# Patient Record
Sex: FEMALE | Race: WHITE | NOT HISPANIC OR LATINO | Employment: OTHER | URBAN - METROPOLITAN AREA
[De-identification: names, ages, dates, MRNs, and addresses within clinical notes are randomized per-mention and may not be internally consistent; named-entity substitution may affect disease eponyms.]

---

## 2017-04-11 ENCOUNTER — GENERIC CONVERSION - ENCOUNTER (OUTPATIENT)
Dept: OTHER | Facility: OTHER | Age: 82
End: 2017-04-11

## 2017-04-11 ENCOUNTER — ALLSCRIPTS OFFICE VISIT (OUTPATIENT)
Dept: OTHER | Facility: OTHER | Age: 82
End: 2017-04-11

## 2017-04-11 ENCOUNTER — TRANSCRIBE ORDERS (OUTPATIENT)
Dept: LAB | Facility: CLINIC | Age: 82
End: 2017-04-11

## 2017-04-11 ENCOUNTER — APPOINTMENT (OUTPATIENT)
Dept: LAB | Facility: CLINIC | Age: 82
End: 2017-04-11
Payer: MEDICARE

## 2017-04-11 DIAGNOSIS — E78.2 MIXED HYPERLIPIDEMIA: ICD-10-CM

## 2017-04-11 DIAGNOSIS — M81.0 AGE-RELATED OSTEOPOROSIS WITHOUT CURRENT PATHOLOGICAL FRACTURE: ICD-10-CM

## 2017-04-11 LAB
ALBUMIN SERPL BCP-MCNC: 3.4 G/DL (ref 3.5–5)
ALP SERPL-CCNC: 63 U/L (ref 46–116)
ALT SERPL W P-5'-P-CCNC: 19 U/L (ref 12–78)
ANION GAP SERPL CALCULATED.3IONS-SCNC: 6 MMOL/L (ref 4–13)
AST SERPL W P-5'-P-CCNC: 26 U/L (ref 5–45)
BILIRUB SERPL-MCNC: 1.24 MG/DL (ref 0.2–1)
BUN SERPL-MCNC: 17 MG/DL (ref 5–25)
CALCIUM SERPL-MCNC: 8.9 MG/DL (ref 8.3–10.1)
CHLORIDE SERPL-SCNC: 102 MMOL/L (ref 100–108)
CHOLEST SERPL-MCNC: 170 MG/DL (ref 50–200)
CO2 SERPL-SCNC: 31 MMOL/L (ref 21–32)
CREAT SERPL-MCNC: 0.6 MG/DL (ref 0.6–1.3)
GFR SERPL CREATININE-BSD FRML MDRD: >60 ML/MIN/1.73SQ M
GLUCOSE P FAST SERPL-MCNC: 83 MG/DL (ref 65–99)
HDLC SERPL-MCNC: 43 MG/DL (ref 40–60)
LDLC SERPL CALC-MCNC: 93 MG/DL (ref 0–100)
POTASSIUM SERPL-SCNC: 4 MMOL/L (ref 3.5–5.3)
PROT SERPL-MCNC: 7.9 G/DL (ref 6.4–8.2)
SODIUM SERPL-SCNC: 139 MMOL/L (ref 136–145)
TRIGL SERPL-MCNC: 171 MG/DL

## 2017-04-11 PROCEDURE — 80053 COMPREHEN METABOLIC PANEL: CPT

## 2017-04-11 PROCEDURE — 80061 LIPID PANEL: CPT

## 2017-04-11 PROCEDURE — 36415 COLL VENOUS BLD VENIPUNCTURE: CPT

## 2017-08-22 ENCOUNTER — GENERIC CONVERSION - ENCOUNTER (OUTPATIENT)
Dept: OTHER | Facility: OTHER | Age: 82
End: 2017-08-22

## 2017-08-23 ENCOUNTER — APPOINTMENT (OUTPATIENT)
Dept: LAB | Facility: CLINIC | Age: 82
End: 2017-08-23
Payer: MEDICARE

## 2017-08-23 ENCOUNTER — TRANSCRIBE ORDERS (OUTPATIENT)
Dept: LAB | Facility: CLINIC | Age: 82
End: 2017-08-23

## 2017-08-23 DIAGNOSIS — R94.5 NONSPECIFIC ABNORMAL RESULTS OF LIVER FUNCTION STUDY: Primary | ICD-10-CM

## 2017-08-23 DIAGNOSIS — R94.5 NONSPECIFIC ABNORMAL RESULTS OF LIVER FUNCTION STUDY: ICD-10-CM

## 2017-08-23 LAB
BASOPHILS # BLD AUTO: 0.03 THOUSANDS/ΜL (ref 0–0.1)
BASOPHILS NFR BLD AUTO: 1 % (ref 0–1)
BILIRUB DIRECT SERPL-MCNC: 0.25 MG/DL (ref 0–0.2)
BILIRUB SERPL-MCNC: 1.39 MG/DL (ref 0.2–1)
EOSINOPHIL # BLD AUTO: 0.21 THOUSAND/ΜL (ref 0–0.61)
EOSINOPHIL NFR BLD AUTO: 3 % (ref 0–6)
ERYTHROCYTE [DISTWIDTH] IN BLOOD BY AUTOMATED COUNT: 12.9 % (ref 11.6–15.1)
HCT VFR BLD AUTO: 45.6 % (ref 34.8–46.1)
HGB BLD-MCNC: 15.5 G/DL (ref 11.5–15.4)
LYMPHOCYTES # BLD AUTO: 1.27 THOUSANDS/ΜL (ref 0.6–4.47)
LYMPHOCYTES NFR BLD AUTO: 20 % (ref 14–44)
MCH RBC QN AUTO: 32 PG (ref 26.8–34.3)
MCHC RBC AUTO-ENTMCNC: 34 G/DL (ref 31.4–37.4)
MCV RBC AUTO: 94 FL (ref 82–98)
MONOCYTES # BLD AUTO: 0.76 THOUSAND/ΜL (ref 0.17–1.22)
MONOCYTES NFR BLD AUTO: 12 % (ref 4–12)
NEUTROPHILS # BLD AUTO: 4.2 THOUSANDS/ΜL (ref 1.85–7.62)
NEUTS SEG NFR BLD AUTO: 64 % (ref 43–75)
NRBC BLD AUTO-RTO: 0 /100 WBCS
PLATELET # BLD AUTO: 310 THOUSANDS/UL (ref 149–390)
PMV BLD AUTO: 9.9 FL (ref 8.9–12.7)
RBC # BLD AUTO: 4.85 MILLION/UL (ref 3.81–5.12)
WBC # BLD AUTO: 6.49 THOUSAND/UL (ref 4.31–10.16)

## 2017-08-23 PROCEDURE — 82247 BILIRUBIN TOTAL: CPT

## 2017-08-23 PROCEDURE — 85025 COMPLETE CBC W/AUTO DIFF WBC: CPT | Performed by: INTERNAL MEDICINE

## 2017-08-23 PROCEDURE — 36415 COLL VENOUS BLD VENIPUNCTURE: CPT | Performed by: INTERNAL MEDICINE

## 2017-08-23 PROCEDURE — 82248 BILIRUBIN DIRECT: CPT

## 2017-10-13 ENCOUNTER — ALLSCRIPTS OFFICE VISIT (OUTPATIENT)
Dept: OTHER | Facility: OTHER | Age: 82
End: 2017-10-13

## 2017-10-16 NOTE — PROGRESS NOTES
Assessment  1  Benign essential hypertension (401 1) (I10)   2  Mixed hyperlipidemia (272 2) (E78 2)   3  Post-menopausal osteoporosis (733 01) (M81 0)    Plan  Benign essential hypertension    · HydroCHLOROthiazide 25 MG Oral Tablet; 1 every morning   · Lisinopril 2 5 MG Oral Tablet; TAKE ONE TABLET BY MOUTH EVERY DAY IN THE  MORNING  Need for influenza vaccination    · Fluzone High-Dose 0 5 ML Intramuscular Suspension Prefilled Syringe    Discussion/Summary    All stable, labs reviewed over last 12 m in 6 m  The treatment plan was reviewed with the patient/guardian  The patient/guardian understands and agrees with the treatment plan      Chief Complaint  Patient is here today for follow up of chronic conditions described in HPI  History of Present Illness  80 y o f seen for f/u HTN, Osteoporosis, HLD - all stable - asymptomatic      Review of Systems    Constitutional: No fever, no chills, feels well, no tiredness, no recent weight gain or weight loss  Eyes: No complaints of eye pain, no red eyes, no eyesight problems, no discharge, no dry eyes, no itching of eyes  ENT: no complaints of earache, no loss of hearing, no nose bleeds, no nasal discharge, no sore throat, no hoarseness  Cardiovascular: No complaints of slow heart rate, no fast heart rate, no chest pain, no palpitations, no leg claudication, no lower extremity edema  Respiratory: No complaints of shortness of breath, no wheezing, no cough, no SOB on exertion, no orthopnea, no PND  Gastrointestinal: No complaints of abdominal pain, no constipation, no nausea or vomiting, no diarrhea, no bloody stools  Genitourinary: No complaints of dysuria, no incontinence, no pelvic pain, no dysmenorrhea, no vaginal discharge or bleeding  Musculoskeletal: No complaints of arthralgias, no myalgias, no joint swelling or stiffness, no limb pain or swelling     Integumentary: No complaints of skin rash or lesions, no itching, no skin wounds, no breast pain or lump  Neurological: No complaints of headache, no confusion, no convulsions, no numbness, no dizziness or fainting, no tingling, no limb weakness, no difficulty walking  Psychiatric: Not suicidal, no sleep disturbance, no anxiety or depression, no change in personality, no emotional problems  Endocrine: No complaints of proptosis, no hot flashes, no muscle weakness, no deepening of the voice, no feelings of weakness  Hematologic/Lymphatic: No complaints of swollen glands, no swollen glands in the neck, does not bleed easily, does not bruise easily  Active Problems  1  Benign essential hypertension (401 1) (I10)   2  Encounter for screening mammogram for malignant neoplasm of breast (V76 12)   (Z12 31)   3  Flu vaccine need (V04 81) (Z23)   4  Mixed hyperlipidemia (272 2) (E78 2)   5  Post-menopausal osteoporosis (733 01) (M81 0)   6  Sarcoidosis (135) (D86 9)   7  Well adult on routine health check (V70 0) (Z00 00)    Past Medical History    The active problems and past medical history were reviewed and updated today  Surgical History  1  History of Appendectomy   2  History of Cataract Surgery   3  History of Hysterectomy   4  History of Partial Colectomy   5  History of Throat Surgery    The surgical history was reviewed and updated today  Family History  Sister    1  Family history of diabetes mellitus (V18 0) (Z83 3)   2  Family history of hypertension (V17 49) (Z82 49)    The family history was reviewed and updated today  Social History   · Denied: History of Alcohol Use (History)   · Daily Tea Consumption (1  Cups/Day)   · Non-smoker (V49 89) (Z78 9)  The social history was reviewed and updated today  Current Meds   1  Alendronate Sodium 70 MG Oral Tablet; once weekly; Therapy: 42Mpr3840 to (Last Rx:25Aot1832)  Requested for: 44XZX7421 Ordered   2  Calcium 600 + D 600-200 MG-UNIT Oral Tablet; 1 Every Day;    Therapy: 56KFT4821 to  Requested for: 47OPG8380 Recorded 3  HydroCHLOROthiazide 25 MG Oral Tablet; 1 EVERY MORNING; Therapy: 42Tlp0618 to (Last Rx:11Apr2017)  Requested for: 11Apr2017 Ordered   4  ICaps Oral Capsule; TAKE 1 CAPSULE DAILY; Therapy: 80JVP7932 to Recorded   5  Lisinopril 2 5 MG Oral Tablet; TAKE ONE TABLET BY MOUTH EVERY DAY IN THE   MORNING; Therapy: 11Apr2012 to (Evaluate:06Jan2018)  Requested for: 11Apr2017; Last   Rx:11Apr2017 Ordered   6  Vytorin 10-20 MG Oral Tablet; 1 qhs;   Therapy: 81Nkz1277 to (Last Rx:28Nov2016)  Requested for: 28Nov2016 Ordered    The medication list was reviewed and updated today  Allergies  1  Niaspan TBCR    Vitals  Vital Signs    Recorded: 57ARN2272 09:56AM   Temperature 97 3 F   Heart Rate 80   Respiration 16   Systolic 195   Diastolic 70   Height 5 ft 1 in   Weight 130 lb 3 2 oz   BMI Calculated 24 6   BSA Calculated 1 57     Physical Exam    Constitutional   General appearance: No acute distress, well appearing and well nourished  -- for pt's age  Pulmonary   Respiratory effort: No increased work of breathing or signs of respiratory distress  Auscultation of lungs: Clear to auscultation  Cardiovascular   Auscultation of heart: Normal rate and rhythm, normal S1 and S2, without murmurs  Examination of extremities for edema and/or varicosities: Normal     Musculoskeletal   Gait and station: Normal     Neurologic   Cranial nerves: Cranial nerves 2-12 intact  Psychiatric   Mood and affect: Normal          Health Management  Health Maintenance   COLONOSCOPY; every 5 years; Last 60YOW5345; Next Due: 92QSS1010; Active    Future Appointments    Date/Time Provider Specialty Site   04/13/2018 10:15 AM PB Cummings   Family Medicine [address]     Signatures   Electronically signed by : PB Rodriguez ; Oct 15 2017  7:03PM EST                       (Author)

## 2018-01-10 NOTE — MISCELLANEOUS
Assessment    1  Community acquired pneumonia (5) (J18 9)    Plan  Community acquired pneumonia    · * XR CHEST PA & LATERAL; Status:Active; Requested for:33Knc6989;    Perform: [facility]; LKW:04TOB8920;KSHSIGG; For:Community acquired pneumonia; Ordered By:Johnnie García; Discussion/Summary  Discussion Summary:   O2 sat w/o oxygen down from 95 to 92 % - was advised to keep O2 still but will cut down to 2 L/min   rto in 2 wks  Medication SE Review and Pt Understands Tx: The treatment plan was reviewed with the patient/guardian  The patient/guardian understands and agrees with the treatment plan      Chief Complaint  Chief Complaint Free Text Note Form: TCM  kss,cma      History of Present Illness  TCM Communication [facility]: The patient is being contacted for follow-up after hospitalization  She was hospitalized at and [address] [place] [address]  The date of admission: 8/1/16, date of discharge: 8/6/16  Diagnosis: Pneumonia - rectal bleed  She was discharged to home  Medications reviewed and updated today  She scheduled a follow up appointment  Symptoms: weakness and fatigue, but no fever, no dizziness, no headache, no cough, no shortness of breath, no chest pain, no back pain on left side, no back pain on right side, no arm pain left side, no arm pain on right side, no leg pain on left side, no leg pain on right side, no upper abdominal pain, no middle abdominal pain, no lower abdominal pain, no rash:, no anorexia, no nausea, no vomiting, no loose stools, no constipation, no pain with urinating, no incisional pain, no wound drainage and no swelling  Counseling was provided to the patient  Communication performed and completed by AARON Walden LPN 1/[birth year]/34   HPI: 80 y o f seen for f/u hospital stay 2 to pneumonia - finished ABs - still on O2 via NC at 3 L/min - no SOB, cough resolved, feeling better overall - eating better, still on PPI - was advised to use it for 1 m more      Review of Systems  Complete-Female:   Constitutional: No fever, no chills, feels well, no tiredness, no recent weight gain or weight loss  Cardiovascular: No complaints of slow heart rate, no fast heart rate, no chest pain, no palpitations, no leg claudication, no lower extremity edema  Respiratory: No complaints of shortness of breath, no wheezing, no cough, no SOB on exertion, no orthopnea, no PND  Gastrointestinal: No complaints of abdominal pain, no constipation, no nausea or vomiting, no diarrhea, no bloody stools  Genitourinary: No complaints of dysuria, no incontinence, no pelvic pain, no dysmenorrhea, no vaginal discharge or bleeding  Musculoskeletal: No complaints of arthralgias, no myalgias, no joint swelling or stiffness, no limb pain or swelling  Integumentary: No complaints of skin rash or lesions, no itching, no skin wounds, no breast pain or lump  Active Problems    1  Benign essential hypertension (401 1) (I10)   2  Encounter for screening mammogram for malignant neoplasm of breast (V76 12)   (Z12 31)   3  Mixed hyperlipidemia (272 2) (E78 2)   4  Post-menopausal osteoporosis (733 01) (M81 0)   5  Sarcoidosis (135) (D86 9)   6  Well adult on routine health check (V70 0) (Z00 00)    Family History  Sister    1  Family history of hypertension (V17 49) (Z82 49)  Family History Reviewed: The family history was reviewed and updated today  Social History    · Denied: History of Alcohol Use (History)   · Daily Tea Consumption (1  Cups/Day)   · Non-smoker (V49 89) (Z78 9)  Social History Reviewed: The social history was reviewed and updated today  Current Meds   1  Alendronate Sodium 70 MG Oral Tablet; once weekly; Therapy: 98Xha3127 to ([place])  Requested for: 80BJD1380 Ordered   2  Calcium 600 + D 600-200 MG-UNIT Oral Tablet; 1 Every Day; Therapy: 79VUQ3325 to  Requested for: 33CLP3279 Recorded   3  Fish Oil 1000 MG Oral Capsule; TAKE 1 CAPSULE DAILY;    Therapy: (Recorded:02Jan2013) to Recorded   4  Hydrochlorothiazide 25 MG Oral Tablet; 1 EVERY MORNING; Therapy: 83Pcj3227 to (Last Rx:29Jun2016)  Requested for: 65ZNQ9345 Ordered   5  ICaps Oral Capsule; TAKE 1 CAPSULE DAILY; Therapy: 15MNL5181 to Recorded   6  Lisinopril 2 5 MG Oral Tablet; TAKE ONE TABLET BY MOUTH EVERY DAY IN THE   MORNING; Therapy: 79Oni1388 to (Evaluate:26Mar2017)  Requested for: 65DKQ8356; Last   Rx:29Jun2016 Ordered   7  Vytorin 10-20 MG Oral Tablet; 1 qhs;   Therapy: 77Pry0684 to (Last Rx:29Jun2016)  Requested for: 57GVI1749 Ordered    Allergies    1  Niaspan TBCR    Vitals  Signs   Recorded: 26HJQ3457 20:90EN   Systolic: 295  Diastolic: 60  Heart Rate: 90  Respiration: 16  Temperature: 98 4 F  O2 Saturation: 94  Height: 5 ft 1 in  Weight: 129 lb   BMI Calculated: 24 37  BSA Calculated: 1 57    Physical Exam    Constitutional   General appearance: No acute distress, well appearing and well nourished  Pulmonary   Respiratory effort: No increased work of breathing or signs of respiratory distress  Auscultation of lungs: Clear to auscultation  Cardiovascular   Auscultation of heart: Normal rate and rhythm, normal S1 and S2, without murmurs  Musculoskeletal   Gait and station: Normal     Neurologic   Cranial nerves: Cranial nerves 2-12 intact  Health Management  Health Maintenance   COLONOSCOPY; every 5 years; Last 58WBG4174; Next Due: 57BAK5709; Active    Future Appointments    Date/Time Provider Specialty Site   08/22/2016 10:00 AM PB Arndt  [address] [place] [address]   12/30/2016 11:00 AM PB Arndt   Family Medicine [address] [facility]     Signatures   Electronically signed by : PB Mann ; Aug 15 2016  6:22PM EST                       (Author)

## 2018-01-12 NOTE — RESULT NOTES
Verified Results  (1) COMPREHENSIVE METABOLIC PANEL 27WPB4289 70:93HG Jaja Noguera Order Number: [id number]     Test Name Result Flag Reference   GLUCOSE,RANDM 95 mg/dL     If the patient is fasting, the ADA then defines impaired fasting glucose as > 100 mg/dL and diabetes as > or equal to 123 mg/dL  SODIUM 139 mmol/L  136-145   POTASSIUM 4 0 mmol/L  3 5-5 3   CHLORIDE 102 mmol/L  100-108   CARBON DIOXIDE 32 mmol/L  21-32   ANION GAP (CALC) 5 mmol/L  4-13   BLOOD UREA NITROGEN 13 mg/dL  5-25   CREATININE 0 64 mg/dL  0 60-1 30   Standardized to IDMS reference method   CALCIUM 9 0 mg/dL  8 3-10 1   BILI, TOTAL 1 05 mg/dL H 0 20-1 00   ALK PHOSPHATAS 65 U/L     ALT (SGPT) 19 U/L  12-78   AST(SGOT) 21 U/L  5-45   ALBUMIN 3 5 g/dL  3 5-5 0   TOTAL PROTEIN 7 8 g/dL  6 4-8 2   eGFR Non-African American      >60 0 ml/min/1 73sq m   - Patient Instructions: This is a fasting blood test  Water,black tea or black  coffee only after 9:00pm the night before test Drink 2 glasses of water the morning of test   National Kidney Disease Education Program recommendations are as follows:  GFR calculation is accurate only with a steady state creatinine  Chronic Kidney disease less than 60 ml/min/1 73 sq  meters  Kidney failure less than 15 ml/min/1 73 sq  meters  (1) LIPID PANEL, FASTING 29Tji0256 08:42AM Ed Martinez    Order Number: [id number]     Test Name Result Flag Reference   CHOLESTEROL 165 mg/dL     HDL,DIRECT 46 mg/dL  40-60   Specimen collection should occur prior to Metamizole administration due to the potential for falsely depressed results  LDL CHOLESTEROL CALCULATED 77 mg/dL  0-100   - Patient Instructions: This is a fasting blood test  Water,black tea or black  coffee only after 9:00pm the night before test   Drink 2 glasses of water the morning of test     - Patient Instructions:  This is a fasting blood test  Water,black tea or black  coffee only after 9:00pm the night before test Drink 2 glasses of water the morning of test   Triglyceride:         Normal              <150 mg/dl       Borderline High    150-199 mg/dl       High               200-499 mg/dl       Very High          >499 mg/dl  Cholesterol:         Desirable        <200 mg/dl      Borderline High  200-239 mg/dl      High             >239 mg/dl  HDL Cholesterol:        High    >59 mg/dL      Low     <41 mg/dL  LDL CALCULATED:    This screening LDL is a calculated result  It does not have the accuracy of the Direct Measured LDL in the monitoring of patients with hyperlipidemia and/or statin therapy  Direct Measure LDL (PKI322) must be ordered separately in these patients  TRIGLYCERIDES 212 mg/dL H <=150   Specimen collection should occur prior to N-Acetylcysteine or Metamizole administration due to the potential for falsely depressed results

## 2018-01-13 VITALS
HEART RATE: 80 BPM | HEIGHT: 61 IN | SYSTOLIC BLOOD PRESSURE: 120 MMHG | WEIGHT: 130.2 LBS | RESPIRATION RATE: 16 BRPM | TEMPERATURE: 97.3 F | DIASTOLIC BLOOD PRESSURE: 70 MMHG | BODY MASS INDEX: 24.58 KG/M2

## 2018-01-14 NOTE — RESULT NOTES
Verified Results  (1) COMPREHENSIVE METABOLIC PANEL 78BYV9775 96:00YY [facility] Order Number: [id number]     Test Name Result Flag Reference   SODIUM 139 mmol/L  136-145   POTASSIUM 4 0 mmol/L  3 5-5 3   CHLORIDE 102 mmol/L  100-108   CARBON DIOXIDE 31 mmol/L  21-32   ANION GAP (CALC) 6 mmol/L  4-13   BLOOD UREA NITROGEN 17 mg/dL  5-25   CREATININE 0 60 mg/dL  0 60-1 30   Standardized to IDMS reference method   CALCIUM 8 9 mg/dL  8 3-10 1   BILI, TOTAL 1 24 mg/dL H 0 20-1 00   ALK PHOSPHATAS 63 U/L     ALT (SGPT) 19 U/L  12-78   AST(SGOT) 26 U/L  5-45   ALBUMIN 3 4 g/dL L 3 5-5 0   TOTAL PROTEIN 7 9 g/dL  6 4-8 2   eGFR Non-African American      >60 0 ml/min/1 73sq m   - Patient Instructions: This is a fasting blood test  Water,black tea or black  coffee only after 9:00pm the night before test Drink 2 glasses of water the morning of test   National Kidney Disease Education Program recommendations are as follows:  GFR calculation is accurate only with a steady state creatinine  Chronic Kidney disease less than 60 ml/min/1 73 sq  meters  Kidney failure less than 15 ml/min/1 73 sq  meters  GLUCOSE FASTING 83 mg/dL  65-99     (1) LIPID PANEL, FASTING 51Aqu8368 10:37AM Paris Carreno    Order Number: [id number]     Test Name Result Flag Reference   CHOLESTEROL 170 mg/dL     HDL,DIRECT 43 mg/dL  40-60   Specimen collection should occur prior to Metamizole administration due to the potential for falsely depressed results  LDL CHOLESTEROL CALCULATED 93 mg/dL  0-100   - Patient Instructions: This is a fasting blood test  Water,black tea or black  coffee only after 9:00pm the night before test   Drink 2 glasses of water the morning of test     - Patient Instructions:  This is a fasting blood test  Water,black tea or black  coffee only after 9:00pm the night before test Drink 2 glasses of water the morning of test   Triglyceride:         Normal              <150 mg/dl       Borderline High    150-199 mg/dl       High               200-499 mg/dl       Very High          >499 mg/dl  Cholesterol:         Desirable        <200 mg/dl      Borderline High  200-239 mg/dl      High             >239 mg/dl  HDL Cholesterol:        High    >59 mg/dL      Low     <41 mg/dL  LDL CALCULATED:    This screening LDL is a calculated result  It does not have the accuracy of the Direct Measured LDL in the monitoring of patients with hyperlipidemia and/or statin therapy  Direct Measure LDL (GTC509) must be ordered separately in these patients  TRIGLYCERIDES 171 mg/dL H <=150   Specimen collection should occur prior to N-Acetylcysteine or Metamizole administration due to the potential for falsely depressed results

## 2018-01-15 VITALS
HEIGHT: 61 IN | TEMPERATURE: 97.7 F | BODY MASS INDEX: 24.43 KG/M2 | RESPIRATION RATE: 16 BRPM | WEIGHT: 129.38 LBS | DIASTOLIC BLOOD PRESSURE: 70 MMHG | HEART RATE: 80 BPM | SYSTOLIC BLOOD PRESSURE: 118 MMHG

## 2018-01-15 NOTE — RESULT NOTES
Verified Results  (1) LIPID PANEL, FASTING 58XWU2258 11:12AM Irene Lee   TW Order Number: [id number]  TW Order Number: [id number] Order Number: [id number]     Test Name Result Flag Reference   CHOLESTEROL 159 mg/dL     HDL,DIRECT 44 mg/dL  40-60   Specimen collection should occur prior to Metamizole administration due to the potential for falsely depressed results  LDL CHOLESTEROL CALCULATED 91 mg/dL  0-100   Triglyceride:         Normal              <150 mg/dl       Borderline High    150-199 mg/dl       High               200-499 mg/dl       Very High          >499 mg/dl  Cholesterol:         Desirable        <200 mg/dl      Borderline High  200-239 mg/dl      High             >239 mg/dl  HDL Cholesterol:        High    >59 mg/dL      Low     <41 mg/dL  LDL CALCULATED:    This screening LDL is a calculated result  It does not have the accuracy of the Direct Measured LDL in the monitoring of patients with hyperlipidemia and/or statin therapy  Direct Measure LDL (AWA121) must be ordered separately in these patients  TRIGLYCERIDES 122 mg/dL  <=150   Specimen collection should occur prior to N-Acetylcysteine or Metamizole administration due to the potential for falsely depressed results  (1) COMPREHENSIVE METABOLIC PANEL 50MWP6200 72:67RK Irene Lee    Order Number: [id number]  TW Order Number: [id number] Order Number: [id number]     Test Name Result Flag Reference   GLUCOSE,RANDM 88 mg/dL     If the patient is fasting, the ADA then defines impaired fasting glucose as > 100 mg/dL and diabetes as > or equal to 123 mg/dL     SODIUM 136 mmol/L  136-145   POTASSIUM 4 4 mmol/L  3 5-5 3   CHLORIDE 101 mmol/L  100-108   CARBON DIOXIDE 29 mmol/L  21-32   ANION GAP (CALC) 6 mmol/L  4-13   BLOOD UREA NITROGEN 14 mg/dL  5-25   CREATININE 0 58 mg/dL L 0 60-1 30   Standardized to IDMS reference method   CALCIUM 9 0 mg/dL  8 3-10 1 BILI, TOTAL 1 37 mg/dL H 0 20-1 00   ALK PHOSPHATAS 58 U/L     ALT (SGPT) 22 U/L  12-78   AST(SGOT) 27 U/L  5-45   ALBUMIN 3 5 g/dL  3 5-5 0   TOTAL PROTEIN 7 9 g/dL  6 4-8 2   eGFR Non-African American      >60 0 ml/min/1 73sq m   [facility] Disease Education Program recommendations are as follows:  GFR calculation is accurate only with a steady state creatinine  Chronic Kidney disease less than 60 ml/min/1 73 sq  meters  Kidney failure less than 15 ml/min/1 73 sq  meters         Discussion/Summary   good results   godo results

## 2018-04-13 ENCOUNTER — OFFICE VISIT (OUTPATIENT)
Dept: FAMILY MEDICINE CLINIC | Facility: CLINIC | Age: 83
End: 2018-04-13
Payer: MEDICARE

## 2018-04-13 ENCOUNTER — APPOINTMENT (OUTPATIENT)
Dept: LAB | Facility: CLINIC | Age: 83
End: 2018-04-13
Payer: MEDICARE

## 2018-04-13 ENCOUNTER — TRANSCRIBE ORDERS (OUTPATIENT)
Dept: LAB | Facility: CLINIC | Age: 83
End: 2018-04-13

## 2018-04-13 VITALS
DIASTOLIC BLOOD PRESSURE: 70 MMHG | HEIGHT: 61 IN | WEIGHT: 130 LBS | TEMPERATURE: 98 F | RESPIRATION RATE: 16 BRPM | BODY MASS INDEX: 24.55 KG/M2 | SYSTOLIC BLOOD PRESSURE: 120 MMHG | HEART RATE: 68 BPM

## 2018-04-13 DIAGNOSIS — E78.49 OTHER HYPERLIPIDEMIA: ICD-10-CM

## 2018-04-13 DIAGNOSIS — M81.0 POST-MENOPAUSAL OSTEOPOROSIS: ICD-10-CM

## 2018-04-13 DIAGNOSIS — I10 BENIGN ESSENTIAL HYPERTENSION: ICD-10-CM

## 2018-04-13 DIAGNOSIS — I10 BENIGN ESSENTIAL HYPERTENSION: Primary | ICD-10-CM

## 2018-04-13 PROBLEM — H35.30 MACULAR DEGENERATION: Status: ACTIVE | Noted: 2017-10-13

## 2018-04-13 LAB
ALBUMIN SERPL BCP-MCNC: 3.5 G/DL (ref 3.5–5)
ALP SERPL-CCNC: 64 U/L (ref 46–116)
ALT SERPL W P-5'-P-CCNC: 21 U/L (ref 12–78)
ANION GAP SERPL CALCULATED.3IONS-SCNC: 2 MMOL/L (ref 4–13)
AST SERPL W P-5'-P-CCNC: 24 U/L (ref 5–45)
BILIRUB SERPL-MCNC: 1.44 MG/DL (ref 0.2–1)
BUN SERPL-MCNC: 19 MG/DL (ref 5–25)
CALCIUM SERPL-MCNC: 8.8 MG/DL
CHLORIDE SERPL-SCNC: 103 MMOL/L (ref 100–108)
CHOLEST SERPL-MCNC: 157 MG/DL (ref 50–200)
CO2 SERPL-SCNC: 33 MMOL/L (ref 21–32)
CREAT SERPL-MCNC: 0.64 MG/DL (ref 0.6–1.3)
GFR SERPL CREATININE-BSD FRML MDRD: 82 ML/MIN/1.73SQ M
GLUCOSE P FAST SERPL-MCNC: 91 MG/DL (ref 65–99)
HDLC SERPL-MCNC: 43 MG/DL (ref 40–60)
LDLC SERPL CALC-MCNC: 82 MG/DL (ref 0–100)
NONHDLC SERPL-MCNC: 114 MG/DL
POTASSIUM SERPL-SCNC: 4.4 MMOL/L (ref 3.5–5.3)
PROT SERPL-MCNC: 8 G/DL (ref 6.4–8.2)
SODIUM SERPL-SCNC: 138 MMOL/L (ref 136–145)
TRIGL SERPL-MCNC: 160 MG/DL

## 2018-04-13 PROCEDURE — 80053 COMPREHEN METABOLIC PANEL: CPT

## 2018-04-13 PROCEDURE — 99214 OFFICE O/P EST MOD 30 MIN: CPT | Performed by: FAMILY MEDICINE

## 2018-04-13 PROCEDURE — 36415 COLL VENOUS BLD VENIPUNCTURE: CPT

## 2018-04-13 PROCEDURE — 80061 LIPID PANEL: CPT

## 2018-04-13 RX ORDER — LISINOPRIL 2.5 MG/1
2.5 TABLET ORAL DAILY
Qty: 90 TABLET | Refills: 3 | Status: ON HOLD | OUTPATIENT
Start: 2018-04-13 | End: 2019-01-21

## 2018-04-13 RX ORDER — HYDROCHLOROTHIAZIDE 25 MG/1
25 TABLET ORAL DAILY
Qty: 90 TABLET | Refills: 3 | Status: SHIPPED | OUTPATIENT
Start: 2018-04-13 | End: 2019-07-08 | Stop reason: SDUPTHER

## 2018-04-13 RX ORDER — B-COMPLEX WITH VITAMIN C
TABLET ORAL DAILY
COMMUNITY
Start: 2008-10-31 | End: 2019-01-18

## 2018-04-13 NOTE — PATIENT INSTRUCTIONS
Low-Sodium Diet   AMBULATORY CARE:   A low-sodium diet  limits foods that are high in sodium (salt)  You will need to follow a low-sodium diet if you have high blood pressure, kidney disease, or heart failure  You may also need to follow this diet if you have a condition that is causing your body to retain (hold) extra fluid  You may need to limit the amount of sodium you eat to 1,500 mg  Ask your healthcare provider how much sodium you can have each day  How to use food labels to choose foods that are low in sodium:  Read food labels to find the amount of sodium they contain  The amount of sodium is listed in milligrams (mg)  The % Daily Value (DV) column tells you how much of your daily needs are met by 1 serving of the food for each nutrient listed  Choose foods that have less than 5% of the DV of sodium  These foods are considered low in sodium  Foods that have 20% or more of the DV of sodium are considered high in sodium  Some food labels may also list any of the following terms that tell you about the sodium content in the food:  · Sodium-free:  Less than 5 mg in each serving    · Very low sodium:  35 mg of sodium or less in each serving    · Low sodium:  140 mg of sodium or less in each serving    · Reduced sodium: At least 25% less sodium in each serving than the regular type    · Light in sodium:  50% less sodium in each serving    · Unsalted or no added salt:  No extra salt is added during processing (the food may still contain sodium)  Foods to avoid:  Salty foods are high in sodium   You should avoid the following:  · Processed foods:      ¨ Mixes for cornbread, biscuits, cake, and pudding     ¨ Instant foods, such as potatoes, cereals, noodles, and rice     ¨ Packaged foods, such as bread stuffing, rice and pasta mixes, snack dip mixes, and macaroni and cheese     ¨ Canned foods, such as canned vegetables, soups, broths, sauces, and vegetable or tomato juice    ¨ Snack foods, such as salted chips, popcorn, pretzels, pork rinds, salted crackers, and salted nuts    ¨ Frozen foods, such as dinners, entrees, vegetables with sauces, and breaded meats    ¨ Sauerkraut, pickled vegetables, and other foods prepared in brine    · Meats and cheeses:      ¨ Smoked or cured meat, such as corned beef, yates, ham, hot dogs, and sausage    ¨ Canned meats or spreads, such as potted meats, sardines, anchovies, and imitation seafood    ¨ Deli or lunch meats, such as bologna, ham, turkey, and roast beef    ¨ Processed cheese, such as American cheese and cheese spreads    · Condiments, sauces, and seasonings:      ¨ Salt (¼ teaspoon of salt contains 575 mg of sodium)    ¨ Seasonings made with salt, such as garlic salt, celery salt, onion salt, and seasoned salt    ¨ Regular soy sauce, barbecue sauce, teriyaki sauce, steak sauce, Worcestershire sauce, and most flavored vinegars    ¨ Canned gravy and mixes     ¨ Regular condiments, such as mustard, ketchup, and salad dressings    ¨ Pickles and olives    ¨ Meat tenderizers and monosodium glutamate (MSG)  Foods to include:  Read the food label to find the exact amount of sodium in each serving  · Bread and cereal:  Try to choose breads with less than 80 mg of sodium per serving  ¨ Bread, roll, kelvin, tortilla, or unsalted crackers  ¨ Ready-to-eat cereals with less than 5% DV of sodium (examples include shredded wheat and puffed rice)    ¨ Pasta    · Vegetables and fruits:      ¨ Unsalted fresh, frozen, or canned vegetables    ¨ Fresh, frozen, or canned fruits    ¨ Fruit juice    · Dairy:  One serving has about 150 mg of sodium  ¨ Milk, all types    ¨ Yogurt    ¨ Hard cheese, such as cheddar, Swiss, [employer], or mozzarella    · Meat and other protein foods:  Some raw meats may have added sodium       ¨ Plain meats, fish, and poultry     ¨ Eggs    · Other foods:      ¨ Homemade pudding    ¨ Unsalted nuts, popcorn, or pretzels    ¨ Unsalted butter or margarine  Ways to decrease sodium:   · Add spices and herbs to foods instead of salt during cooking  Use salt-free seasonings to add flavor to foods  Examples include onion powder, garlic powder, basil, morocho powder, paprika, and parsley  Try lemon or lime juice or vinegar to give foods a tart flavor  Use hot peppers, pepper, or cayenne pepper to add a spicy flavor to foods  · Do not keep a salt shaker at your kitchen table  This may help keep you from adding salt to food at the table  It may take time to get used to enjoying the natural flavor of food instead of adding salt  Talk to your healthcare provider before you use salt substitutes  Some salt substitutes have a high amount of potassium and need to be avoided if you have kidney disease  · Choose low-sodium foods at restaurants  Meals from restaurants are often high in sodium  Some restaurants have nutrition information on the menu that tells you the amount of sodium in their foods  If possible, ask for your food to be prepared with less, or no salt  · Shop for unsalted or low-sodium foods and snacks at the grocery store  Examples include unsalted or low-sodium broths, soups, and canned vegetables  Choose fresh or frozen vegetables instead  Choose unsalted nuts or seeds or fresh fruits or vegetables as snacks  Read food labels and choose salt-free, very low-sodium, or low-sodium foods  © 2017 [employer] Information is for End User's use only and may not be sold, redistributed or otherwise used for commercial purposes  All illustrations and images included in CareNotes® are the copyrighted property of [employer]  or Janes Wilson  The above information is an  only  It is not intended as medical advice for individual conditions or treatments  Talk to your doctor, nurse or pharmacist before following any medical regimen to see if it is safe and effective for you

## 2018-04-13 NOTE — PROGRESS NOTES
Chief Complaint   Patient presents with    Blood Pressure Check    Follow-up     chronic conditions        Patient ID: Taryn Louis is a 80 y o  female  HPI  Pt is HTN, HLD, Osteoporosis -  All stable  -  Taking meds     The following portions of the patient's history were reviewed and updated as appropriate: allergies, current medications, past family history, past medical history, past social history, past surgical history and problem list     Review of Systems   Constitutional: Negative  Respiratory: Negative  Cardiovascular: Negative  Gastrointestinal: Negative  Genitourinary: Negative  Musculoskeletal: Negative  Skin: Negative  Neurological: Negative  Current Outpatient Prescriptions   Medication Sig Dispense Refill    alendronate (FOSAMAX) 70 mg tablet Take 70 mg by mouth every 7 days   Calcium Carbonate-Vitamin D (CALCIUM 600+D) 600-200 MG-UNIT TABS Take by mouth daily      ezetimibe-simvastatin (VYTORIN) 10-20 mg per tablet Take 1 tablet by mouth daily at bedtime   hydrochlorothiazide (HYDRODIURIL) 25 mg tablet Take 1 tablet (25 mg total) by mouth daily 90 tablet 3    lisinopril (ZESTRIL) 2 5 mg tablet Take 1 tablet (2 5 mg total) by mouth daily 90 tablet 3     No current facility-administered medications for this visit  Objective:    /70 (BP Location: Right arm, Patient Position: Sitting, Cuff Size: Standard)   Pulse 68   Temp 98 °F (36 7 °C) (Tympanic)   Resp 16   Ht 5' 1" (1 549 m)   Wt 59 kg (130 lb)   BMI 24 56 kg/m²        Physical Exam   Constitutional: She is oriented to person, place, and time  She appears well-nourished  No distress  Cardiovascular: Normal rate and regular rhythm  No murmur heard  Pulmonary/Chest: Effort normal and breath sounds normal  No respiratory distress  She has no wheezes  She has no rales  Musculoskeletal: She exhibits no edema  Neurological: She is oriented to person, place, and time   No cranial nerve deficit  Assessment/Plan:         Diagnoses and all orders for this visit:    Benign essential hypertension  -     lisinopril (ZESTRIL) 2 5 mg tablet; Take 1 tablet (2 5 mg total) by mouth daily  -     hydrochlorothiazide (HYDRODIURIL) 25 mg tablet; Take 1 tablet (25 mg total) by mouth daily  -     Comprehensive metabolic panel; Future    Other hyperlipidemia  -     Lipid panel; Future    Post-menopausal osteoporosis  -     DXA bone density spine hip and pelvis;  Future    Other orders  -     Calcium Carbonate-Vitamin D (CALCIUM 600+D) 600-200 MG-UNIT TABS; Take by mouth daily      all stable  Cont meds  Low Na diet   rto in 6 m                     Nely Washington MD

## 2018-04-14 ENCOUNTER — TELEPHONE (OUTPATIENT)
Dept: FAMILY MEDICINE CLINIC | Facility: CLINIC | Age: 83
End: 2018-04-14

## 2018-04-14 NOTE — TELEPHONE ENCOUNTER
----- Message from Eris Soler MD sent at 4/14/2018  8:25 AM EDT -----  Pl, advise pt -  Labs are normal

## 2018-05-01 ENCOUNTER — HOSPITAL ENCOUNTER (OUTPATIENT)
Dept: RADIOLOGY | Facility: CLINIC | Age: 83
Discharge: HOME | End: 2018-05-01
Payer: MEDICARE

## 2018-05-01 DIAGNOSIS — M81.0 POST-MENOPAUSAL OSTEOPOROSIS: ICD-10-CM

## 2018-05-01 PROCEDURE — 77080 DXA BONE DENSITY AXIAL: CPT

## 2018-06-26 DIAGNOSIS — M81.0 OSTEOPOROSIS, UNSPECIFIED OSTEOPOROSIS TYPE, UNSPECIFIED PATHOLOGICAL FRACTURE PRESENCE: Primary | ICD-10-CM

## 2018-06-26 RX ORDER — ALENDRONATE SODIUM 70 MG/1
TABLET ORAL
Qty: 12 TABLET | Refills: 3 | Status: SHIPPED | OUTPATIENT
Start: 2018-06-26 | End: 2019-07-08 | Stop reason: SDUPTHER

## 2018-10-15 ENCOUNTER — APPOINTMENT (OUTPATIENT)
Dept: LAB | Facility: CLINIC | Age: 83
End: 2018-10-15
Payer: MEDICARE

## 2018-10-15 ENCOUNTER — TRANSCRIBE ORDERS (OUTPATIENT)
Dept: LAB | Facility: CLINIC | Age: 83
End: 2018-10-15

## 2018-10-15 ENCOUNTER — OFFICE VISIT (OUTPATIENT)
Dept: FAMILY MEDICINE CLINIC | Facility: CLINIC | Age: 83
End: 2018-10-15
Payer: MEDICARE

## 2018-10-15 VITALS
BODY MASS INDEX: 24.92 KG/M2 | HEART RATE: 72 BPM | SYSTOLIC BLOOD PRESSURE: 120 MMHG | HEIGHT: 61 IN | TEMPERATURE: 97.8 F | WEIGHT: 132 LBS | DIASTOLIC BLOOD PRESSURE: 70 MMHG | RESPIRATION RATE: 16 BRPM

## 2018-10-15 DIAGNOSIS — R30.0 DYSURIA: ICD-10-CM

## 2018-10-15 DIAGNOSIS — Z23 NEED FOR INFLUENZA VACCINATION: ICD-10-CM

## 2018-10-15 DIAGNOSIS — E78.49 OTHER HYPERLIPIDEMIA: ICD-10-CM

## 2018-10-15 DIAGNOSIS — Z00.00 ROUTINE MEDICAL EXAM: ICD-10-CM

## 2018-10-15 DIAGNOSIS — M81.0 POST-MENOPAUSAL OSTEOPOROSIS: ICD-10-CM

## 2018-10-15 DIAGNOSIS — I10 BENIGN ESSENTIAL HYPERTENSION: Primary | ICD-10-CM

## 2018-10-15 DIAGNOSIS — I10 BENIGN ESSENTIAL HYPERTENSION: ICD-10-CM

## 2018-10-15 LAB
ALBUMIN SERPL BCP-MCNC: 3.4 G/DL (ref 3.5–5)
ALP SERPL-CCNC: 65 U/L (ref 46–116)
ALT SERPL W P-5'-P-CCNC: 23 U/L (ref 12–78)
ANION GAP SERPL CALCULATED.3IONS-SCNC: 3 MMOL/L (ref 4–13)
AST SERPL W P-5'-P-CCNC: 24 U/L (ref 5–45)
BILIRUB SERPL-MCNC: 1.27 MG/DL (ref 0.2–1)
BUN SERPL-MCNC: 16 MG/DL (ref 5–25)
CALCIUM SERPL-MCNC: 8.9 MG/DL (ref 8.3–10.1)
CHLORIDE SERPL-SCNC: 101 MMOL/L (ref 100–108)
CHOLEST SERPL-MCNC: 157 MG/DL (ref 50–200)
CO2 SERPL-SCNC: 32 MMOL/L (ref 21–32)
CREAT SERPL-MCNC: 0.61 MG/DL (ref 0.6–1.3)
GFR SERPL CREATININE-BSD FRML MDRD: 83 ML/MIN/1.73SQ M
GLUCOSE P FAST SERPL-MCNC: 94 MG/DL (ref 65–99)
HDLC SERPL-MCNC: 41 MG/DL (ref 40–60)
LDLC SERPL CALC-MCNC: 90 MG/DL (ref 0–100)
NONHDLC SERPL-MCNC: 116 MG/DL
POTASSIUM SERPL-SCNC: 4.1 MMOL/L (ref 3.5–5.3)
PROT SERPL-MCNC: 7.9 G/DL (ref 6.4–8.2)
SL AMB  POCT GLUCOSE, UA: ABNORMAL
SL AMB LEUKOCYTE ESTERASE,UA: 500
SL AMB POCT BILIRUBIN,UA: ABNORMAL
SL AMB POCT BLOOD,UA: ABNORMAL
SL AMB POCT CLARITY,UA: ABNORMAL
SL AMB POCT COLOR,UA: YELLOW
SL AMB POCT KETONES,UA: ABNORMAL
SL AMB POCT NITRITE,UA: ABNORMAL
SL AMB POCT PH,UA: 7
SL AMB POCT SPECIFIC GRAVITY,UA: 1
SL AMB POCT URINE PROTEIN: ABNORMAL
SL AMB POCT UROBILINOGEN: ABNORMAL
SODIUM SERPL-SCNC: 136 MMOL/L (ref 136–145)
TRIGL SERPL-MCNC: 129 MG/DL

## 2018-10-15 PROCEDURE — G0439 PPPS, SUBSEQ VISIT: HCPCS | Performed by: FAMILY MEDICINE

## 2018-10-15 PROCEDURE — 87077 CULTURE AEROBIC IDENTIFY: CPT | Performed by: FAMILY MEDICINE

## 2018-10-15 PROCEDURE — 36415 COLL VENOUS BLD VENIPUNCTURE: CPT

## 2018-10-15 PROCEDURE — 80053 COMPREHEN METABOLIC PANEL: CPT

## 2018-10-15 PROCEDURE — 80061 LIPID PANEL: CPT

## 2018-10-15 PROCEDURE — 99214 OFFICE O/P EST MOD 30 MIN: CPT | Performed by: FAMILY MEDICINE

## 2018-10-15 PROCEDURE — 81003 URINALYSIS AUTO W/O SCOPE: CPT | Performed by: FAMILY MEDICINE

## 2018-10-15 PROCEDURE — 87186 SC STD MICRODIL/AGAR DIL: CPT | Performed by: FAMILY MEDICINE

## 2018-10-15 PROCEDURE — G0008 ADMIN INFLUENZA VIRUS VAC: HCPCS

## 2018-10-15 PROCEDURE — 90662 IIV NO PRSV INCREASED AG IM: CPT

## 2018-10-15 PROCEDURE — 87086 URINE CULTURE/COLONY COUNT: CPT | Performed by: FAMILY MEDICINE

## 2018-10-15 NOTE — PROGRESS NOTES
Chief Complaint   Patient presents with    Follow-up     chronic conditions    Medicare Wellness Visit        Patient ID: Dayana Dowell is a 80 y o  female  HPI  Pt is seeing for f/u HTN, Osteoporosis, HLD -  Stable, had cold and dysuria symptoms 1 wk ago -  Resolved     The following portions of the patient's history were reviewed and updated as appropriate: allergies, current medications, past family history, past medical history, past social history, past surgical history and problem list     Review of Systems   Constitutional: Negative  Respiratory: Negative  Cardiovascular: Negative  Gastrointestinal: Negative  Negative for bowel incontinence  Genitourinary: Negative  Musculoskeletal: Negative  Skin: Negative  Neurological: Negative  Psychiatric/Behavioral: The patient is nervous/anxious  Current Outpatient Prescriptions   Medication Sig Dispense Refill    alendronate (FOSAMAX) 70 mg tablet TAKE ONE TABLET BY MOUTH ONCE EVERY WEEK 12 tablet 3    Calcium Carbonate-Vitamin D (CALCIUM 600+D) 600-200 MG-UNIT TABS Take by mouth daily      ezetimibe-simvastatin (VYTORIN) 10-20 mg per tablet Take 1 tablet by mouth daily at bedtime   hydrochlorothiazide (HYDRODIURIL) 25 mg tablet Take 1 tablet (25 mg total) by mouth daily 90 tablet 3    lisinopril (ZESTRIL) 2 5 mg tablet Take 1 tablet (2 5 mg total) by mouth daily 90 tablet 3     No current facility-administered medications for this visit  Objective:    /70 (BP Location: Right arm, Patient Position: Sitting, Cuff Size: Large)   Pulse 72   Temp 97 8 °F (36 6 °C) (Tympanic)   Resp 16   Ht 5' 1" (1 549 m)   Wt 59 9 kg (132 lb)   BMI 24 94 kg/m²        Physical Exam   Constitutional: She is oriented to person, place, and time  She appears well-nourished  No distress  Eyes: Conjunctivae are normal    Neck: Normal range of motion  Cardiovascular: Normal rate and regular rhythm      No murmur heard   Pulmonary/Chest: Effort normal and breath sounds normal  No respiratory distress  She has no wheezes  She has no rales  Abdominal: Soft  There is no tenderness  Musculoskeletal: She exhibits no edema  Neurological: She is oriented to person, place, and time  No cranial nerve deficit  Skin: No pallor  Psychiatric: She has a normal mood and affect  Her behavior is normal  Judgment and thought content normal          Labs in chart were reviewed  Assessment/Plan:         Diagnoses and all orders for this visit:    Benign essential hypertension  -     Comprehensive metabolic panel; Future  -     Lipid panel; Future    Other hyperlipidemia    Post-menopausal osteoporosis    Routine medical exam    Dysuria  -     POCT urine dip auto non-scope  -     Urine culture; Future  -     Urine culture    Need for influenza vaccination  -     influenza vaccine, [id number], high-dose, PF 0 5 mL, for patients 65 yr+ (FLUZONE HIGH-DOSE)          rto in 3 m                   Tk Bright MD    Assessment and Plan:    Problem List Items Addressed This Visit        Cardiovascular and Mediastinum    Benign essential hypertension - Primary    Relevant Orders    Comprehensive metabolic panel    Lipid panel       Musculoskeletal and Integument    Post-menopausal osteoporosis       Other    Other hyperlipidemia      Other Visit Diagnoses     Routine medical exam        Dysuria        Relevant Orders    POCT urine dip auto non-scope (Completed)    Urine culture    Need for influenza vaccination        Relevant Orders    influenza vaccine, [id number], high-dose, PF 0 5 mL, for patients 65 yr+ (FLUZONE HIGH-DOSE) (Completed)        Health Maintenance Due   Topic Date Due    DTaP,Tdap,and Td Vaccines (2 - Td) 04/05/2017    INFLUENZA VACCINE  07/01/2018         HPI:  Sajan Allen is a 80 y o  female here for her Subsequent Wellness Visit      Patient Active Problem List   Diagnosis    Benign essential hypertension    Other hyperlipidemia    Macular degeneration    Post-menopausal osteoporosis     Past Medical History:   Diagnosis Date    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[address] )     Community acquired pneumonia     last assessed: 10/11/16    Hyperlipidemia     Hypertension      Past Surgical History:   Procedure Laterality Date    APPENDECTOMY      BOWEL RESECTION      CATARACT EXTRACTION EXTRACAPSULAR W/ INTRAOCULAR LENS IMPLANTATION Bilateral     COLECTOMY      Partial    COLON SURGERY      colon cancer    COLONOSCOPY N/A 8/5/2016    Procedure: COLONOSCOPY;  Surgeon: Ralph Rivera MD;  Location: [place] [address] [facility]; Service:     HYSTERECTOMY      THROAT SURGERY      TRACHEAL SURGERY      tumor removed benign    TUMOR REMOVAL N/A 2000    trachea     Family History   Problem Relation Age of Onset    Diabetes Sister     Hypertension Sister      History   Smoking Status    Never Smoker   Smokeless Tobacco    Never Used     History   Alcohol Use No      History   Drug use: Unknown       Current Outpatient Prescriptions   Medication Sig Dispense Refill    alendronate (FOSAMAX) 70 mg tablet TAKE ONE TABLET BY MOUTH ONCE EVERY WEEK 12 tablet 3    Calcium Carbonate-Vitamin D (CALCIUM 600+D) 600-200 MG-UNIT TABS Take by mouth daily      ezetimibe-simvastatin (VYTORIN) 10-20 mg per tablet Take 1 tablet by mouth daily at bedtime   hydrochlorothiazide (HYDRODIURIL) 25 mg tablet Take 1 tablet (25 mg total) by mouth daily 90 tablet 3    lisinopril (ZESTRIL) 2 5 mg tablet Take 1 tablet (2 5 mg total) by mouth daily 90 tablet 3     No current facility-administered medications for this visit        Allergies   Allergen Reactions    Niacin And Related      Immunization History   Administered Date(s) Administered    Influenza Split High Dose Preservative Free IM 10/09/2012, 11/03/2014, 10/11/2016, 10/13/2017    Influenza TIV (IM) 10/18/2006, 10/10/2007, 10/31/2008, 10/12/2010, 10/10/2011, 12/27/2013    Influenza, high dose seasonal 0 5 mL 10/15/2018    Pneumococcal Conjugate 13-Valent 2015    Pneumococcal Polysaccharide PPV23 2013    Tdap 2007       Patient Care Team:  Roosevelt Corbett MD as PCP - General  Lizett Leonard MD    Medicare Screening Tests and Risk Assessments:  Marylee Sherry is here for her Subsequent Wellness visit  Last Medicare Wellness visit information reviewed, patient interviewed and updates made to the record today  Health Risk Assessment:  Patient rates overall health as fair  Patient feels that their physical health rating is Same  Eyesight was rated as Slightly worse  Hearing was rated as Same  Patient feels that their emotional and mental health rating is Same  Pain experienced by patient in the last 7 days has been None  Patient states that she has experienced no weight loss or gain in last 6 months  (Additional comments: F/u with ophthalmologist )    Emotional/Mental Health:  Patient has been feeling nervous/anxious  PHQ-9 Depression Screening:    Frequency of the following problems over the past two weeks:      1  Little interest or pleasure in doing things: 1 - several days      2  Feeling down, depressed, or hopeless: 0 - not at all      3  Trouble falling or staying asleep, or sleeping too much: 0 - not at all      4  Feeling tired or having little energy: 0 - not at all      5  Poor appetite or overeatin - not at all      6  Feeling bad about yourself - or that you are a failure or have let yourself or your family down: 0 - not at all      7  Trouble concentrating on things, such as reading the newspaper or watching television: 0 - not at all      8  Moving or speaking so slowly that other people could have noticed  Or the opposite - being so fidgety or restless that you have been moving around a lot more than usual: 0 - not at all      9   Thoughts that you would be better off dead, or of hurting yourself in some way: 0 - not at all  PHQ-2 Score: 1  PHQ-9 Score: 1    Broken Bones/Falls: Fall Risk Assessment:    In the past year, patient has experienced: No history of falling in past year          Bladder/Bowel:  Patient has leaked urine accidently in the last six months  Patient reports no loss of bowel control  Immunizations:  Patient has had a flu vaccination within the last year  Patient has received a pneumonia shot  Patient has not received a shingles shot  Patient has not received tetanus/diphtheria shot  Home Safety:  Patient does not have trouble with stairs inside or outside of their home  Patient currently reports that there are no safety hazards present in home, working smoke alarms, working carbon monoxide detectors  Preventative Screenings:   Breast cancer screening performed, colon cancer screen completed, cholesterol screen completed, glaucoma eye exam completed,     Nutrition:  Current diet: Low Cholesterol, Limited junk food, No Added Salt and Regular with servings of the following:    Medications:  Patient is currently taking over-the-counter supplements  Patient is able to manage medications  Lifestyle Choices:  Patient reports no tobacco use  Patient has not smoked or used tobacco in the past   Patient reports no alcohol use  Patient does not drive a vehicle  Patient wears seat belt  Activities of Daily Living:  Can get out of bed by his or her self, able to dress self, able to make own meals, able to do own shopping, able to bathe self, can do own laundry/housekeeping, can manage own money, pay bills and track expenses    Previous Hospitalizations:  No hospitalization or ED visit in past 12 months        Advanced Directives:  Patient has decided on a power of   Patient has not spoken to designated power of   Patient has completed advanced directive          Preventative Screening/Counseling:      Cardiovascular:      General: Risks and Benefits Discussed and Screening Current      Counseling: Healthy Diet Diabetes:      General: Screening Current and Risks and Benefits Discussed      Counseling: Healthy Diet          Colorectal Cancer:      General: Screening Current          Breast Cancer:      General: Screening Not Indicated          Cervical Cancer:      General: Screening Not Indicated          Osteoporosis:      General: Screening Current          AAA:      General: Screening Not Indicated          Glaucoma:      General: Screening Current          HIV:      General: Screening Not Indicated          Hepatitis C:      General: Screening Not Indicated        Advanced Directives:   Patient has living will for healthcare, has durable POA for healthcare, patient has an advanced directive       Immunizations:  Patient reviewed and up to date      Influenza: Influenza Due Today, Risks & Benefits Discussed and Influenza Recommended Annually      Pneumococcal: Lifetime Vaccine Completed      Shingrix: Shingrix Vaccine Needed Today and Risks & Benefits Discussed

## 2018-10-15 NOTE — PATIENT INSTRUCTIONS
Low-Sodium Diet   AMBULATORY CARE:   A low-sodium diet  limits foods that are high in sodium (salt)  You will need to follow a low-sodium diet if you have high blood pressure, kidney disease, or heart failure  You may also need to follow this diet if you have a condition that is causing your body to retain (hold) extra fluid  You may need to limit the amount of sodium you eat to 1,500 mg  Ask your healthcare provider how much sodium you can have each day  How to use food labels to choose foods that are low in sodium:  Read food labels to find the amount of sodium they contain  The amount of sodium is listed in milligrams (mg)  The % Daily Value (DV) column tells you how much of your daily needs are met by 1 serving of the food for each nutrient listed  Choose foods that have less than 5% of the DV of sodium  These foods are considered low in sodium  Foods that have 20% or more of the DV of sodium are considered high in sodium  Some food labels may also list any of the following terms that tell you about the sodium content in the food:  · Sodium-free:  Less than 5 mg in each serving    · Very low sodium:  35 mg of sodium or less in each serving    · Low sodium:  140 mg of sodium or less in each serving    · Reduced sodium: At least 25% less sodium in each serving than the regular type    · Light in sodium:  50% less sodium in each serving    · Unsalted or no added salt:  No extra salt is added during processing (the food may still contain sodium)  Foods to avoid:  Salty foods are high in sodium   You should avoid the following:  · Processed foods:      ¨ Mixes for cornbread, biscuits, cake, and pudding     ¨ Instant foods, such as potatoes, cereals, noodles, and rice     ¨ Packaged foods, such as bread stuffing, rice and pasta mixes, snack dip mixes, and macaroni and cheese     ¨ Canned foods, such as canned vegetables, soups, broths, sauces, and vegetable or tomato juice    ¨ Snack foods, such as salted chips, popcorn, pretzels, pork rinds, salted crackers, and salted nuts    ¨ Frozen foods, such as dinners, entrees, vegetables with sauces, and breaded meats    ¨ Sauerkraut, pickled vegetables, and other foods prepared in brine    · Meats and cheeses:      ¨ Smoked or cured meat, such as corned beef, yates, ham, hot dogs, and sausage    ¨ Canned meats or spreads, such as potted meats, sardines, anchovies, and imitation seafood    ¨ Deli or lunch meats, such as bologna, ham, turkey, and roast beef    ¨ Processed cheese, such as American cheese and cheese spreads    · Condiments, sauces, and seasonings:      ¨ Salt (¼ teaspoon of salt contains 575 mg of sodium)    ¨ Seasonings made with salt, such as garlic salt, celery salt, onion salt, and seasoned salt    ¨ Regular soy sauce, barbecue sauce, teriyaki sauce, steak sauce, Worcestershire sauce, and most flavored vinegars    ¨ Canned gravy and mixes     ¨ Regular condiments, such as mustard, ketchup, and salad dressings    ¨ Pickles and olives    ¨ Meat tenderizers and monosodium glutamate (MSG)  Foods to include:  Read the food label to find the exact amount of sodium in each serving  · Bread and cereal:  Try to choose breads with less than 80 mg of sodium per serving  ¨ Bread, roll, kelvin, tortilla, or unsalted crackers  ¨ Ready-to-eat cereals with less than 5% DV of sodium (examples include shredded wheat and puffed rice)    ¨ Pasta    · Vegetables and fruits:      ¨ Unsalted fresh, frozen, or canned vegetables    ¨ Fresh, frozen, or canned fruits    ¨ Fruit juice    · Dairy:  One serving has about 150 mg of sodium  ¨ Milk, all types    ¨ Yogurt    ¨ Hard cheese, such as cheddar, Swiss, [employer], or mozzarella    · Meat and other protein foods:  Some raw meats may have added sodium       ¨ Plain meats, fish, and poultry     ¨ Eggs    · Other foods:      ¨ Homemade pudding    ¨ Unsalted nuts, popcorn, or pretzels    ¨ Unsalted butter or margarine  Ways to decrease sodium:   · Add spices and herbs to foods instead of salt during cooking  Use salt-free seasonings to add flavor to foods  Examples include onion powder, garlic powder, basil, morocho powder, paprika, and parsley  Try lemon or lime juice or vinegar to give foods a tart flavor  Use hot peppers, pepper, or cayenne pepper to add a spicy flavor to foods  · Do not keep a salt shaker at your kitchen table  This may help keep you from adding salt to food at the table  It may take time to get used to enjoying the natural flavor of food instead of adding salt  Talk to your healthcare provider before you use salt substitutes  Some salt substitutes have a high amount of potassium and need to be avoided if you have kidney disease  · Choose low-sodium foods at restaurants  Meals from restaurants are often high in sodium  Some restaurants have nutrition information on the menu that tells you the amount of sodium in their foods  If possible, ask for your food to be prepared with less, or no salt  · Shop for unsalted or low-sodium foods and snacks at the grocery store  Examples include unsalted or low-sodium broths, soups, and canned vegetables  Choose fresh or frozen vegetables instead  Choose unsalted nuts or seeds or fresh fruits or vegetables as snacks  Read food labels and choose salt-free, very low-sodium, or low-sodium foods  © 2017 [employer] Information is for End User's use only and may not be sold, redistributed or otherwise used for commercial purposes  All illustrations and images included in CareNotes® are the copyrighted property of A D A M [employer]  or Janes Wilson  The above information is an  only  It is not intended as medical advice for individual conditions or treatments  Talk to your doctor, nurse or pharmacist before following any medical regimen to see if it is safe and effective for you

## 2018-10-16 ENCOUNTER — TELEPHONE (OUTPATIENT)
Dept: FAMILY MEDICINE CLINIC | Facility: CLINIC | Age: 83
End: 2018-10-16

## 2018-10-16 DIAGNOSIS — N39.0 URINARY TRACT INFECTION WITHOUT HEMATURIA, SITE UNSPECIFIED: Primary | ICD-10-CM

## 2018-10-16 RX ORDER — CEPHALEXIN 250 MG/1
250 CAPSULE ORAL EVERY 8 HOURS SCHEDULED
Qty: 21 CAPSULE | Refills: 0 | Status: SHIPPED | OUTPATIENT
Start: 2018-10-16 | End: 2018-10-23

## 2018-10-16 NOTE — TELEPHONE ENCOUNTER
----- Message from Scott Kaur MD sent at 10/16/2018  1:15 PM EDT -----  Pl, advise pt -  Urine Cx grew bacteria -  I sent AB over

## 2018-10-16 NOTE — TELEPHONE ENCOUNTER
----- Message from Zev Chavis MD sent at 10/16/2018  8:00 AM EDT -----  Pl, advise pt -  Normal labs

## 2018-10-17 LAB — BACTERIA UR CULT: ABNORMAL

## 2018-11-28 DIAGNOSIS — E78.5 HYPERLIPIDEMIA, UNSPECIFIED HYPERLIPIDEMIA TYPE: Primary | ICD-10-CM

## 2018-11-28 RX ORDER — EZETIMIBE AND SIMVASTATIN 10; 20 MG/1; MG/1
TABLET ORAL
Qty: 90 TABLET | Refills: 3 | Status: SHIPPED | OUTPATIENT
Start: 2018-11-28 | End: 2019-03-08 | Stop reason: SDUPTHER

## 2019-01-14 ENCOUNTER — OFFICE VISIT (OUTPATIENT)
Dept: FAMILY MEDICINE CLINIC | Facility: CLINIC | Age: 84
End: 2019-01-14
Payer: MEDICARE

## 2019-01-14 VITALS
WEIGHT: 133 LBS | SYSTOLIC BLOOD PRESSURE: 126 MMHG | RESPIRATION RATE: 16 BRPM | HEIGHT: 61 IN | DIASTOLIC BLOOD PRESSURE: 70 MMHG | HEART RATE: 84 BPM | TEMPERATURE: 98.8 F | BODY MASS INDEX: 25.11 KG/M2

## 2019-01-14 DIAGNOSIS — R05.9 COUGH: Primary | ICD-10-CM

## 2019-01-14 PROCEDURE — 99213 OFFICE O/P EST LOW 20 MIN: CPT | Performed by: FAMILY MEDICINE

## 2019-01-14 RX ORDER — CEPHALEXIN 500 MG/1
500 CAPSULE ORAL EVERY 8 HOURS SCHEDULED
Qty: 21 CAPSULE | Refills: 0 | Status: SHIPPED | OUTPATIENT
Start: 2019-01-14 | End: 2019-01-18

## 2019-01-14 RX ORDER — DEXTROMETHORPHAN HYDROBROMIDE AND PROMETHAZINE HYDROCHLORIDE 15; 6.25 MG/5ML; MG/5ML
5 SYRUP ORAL DAILY
Qty: 118 ML | Refills: 0 | Status: SHIPPED | OUTPATIENT
Start: 2019-01-14 | End: 2019-01-18 | Stop reason: CLARIF

## 2019-01-14 NOTE — PROGRESS NOTES
Chief Complaint   Patient presents with    Laryngitis     x 3 days    Cough        Patient ID: Leobardo Greenwood is a 80 y o  female  HPI  Pt is seeing for sore throat and cough x 3 days, taking Robitussin with some help, h/o pneumonia in the past, less appetite, no fever, no SOB, no GI symptoms     The following portions of the patient's history were reviewed and updated as appropriate: allergies, current medications, past family history, past medical history, past social history, past surgical history and problem list     Review of Systems   Constitutional: Positive for appetite change and fatigue  Negative for fever  HENT: Positive for congestion, postnasal drip and sore throat  Respiratory: Positive for cough  Negative for chest tightness, shortness of breath and wheezing  Gastrointestinal: Negative  Current Outpatient Prescriptions   Medication Sig Dispense Refill    alendronate (FOSAMAX) 70 mg tablet TAKE ONE TABLET BY MOUTH ONCE EVERY WEEK 12 tablet 3    Calcium Carbonate-Vitamin D (CALCIUM 600+D) 600-200 MG-UNIT TABS Take by mouth daily      ezetimibe-simvastatin (VYTORIN) 10-20 mg per tablet TAKE ONE TABLET BY MOUTH EVERY DAY AT BEDTIME 90 tablet 3    hydrochlorothiazide (HYDRODIURIL) 25 mg tablet Take 1 tablet (25 mg total) by mouth daily 90 tablet 3    lisinopril (ZESTRIL) 2 5 mg tablet Take 1 tablet (2 5 mg total) by mouth daily 90 tablet 3     No current facility-administered medications for this visit  Objective:    /70 (BP Location: Right arm, Patient Position: Sitting, Cuff Size: Standard)   Pulse 84   Temp 98 8 °F (37 1 °C) (Tympanic)   Resp 16   Ht 5' 1" (1 549 m)   Wt 60 3 kg (133 lb)   BMI 25 13 kg/m²        Physical Exam   Constitutional: No distress  HENT:   Mouth/Throat: Oropharynx is clear and moist  No oropharyngeal exudate  Eyes: Conjunctivae are normal    Cardiovascular: Normal rate      Pulmonary/Chest: Effort normal and breath sounds normal  No respiratory distress  She has no wheezes  She has no rales  Assessment/Plan:         Diagnoses and all orders for this visit:    Cough  -     cephalexin (KEFLEX) 500 mg capsule; Take 1 capsule (500 mg total) by mouth every 8 (eight) hours for 7 days  -     promethazine-dextromethorphan (PHENERGAN-DM) 6 25-15 mg/5 mL oral syrup;  Take 5 mL by mouth daily          rto prn                   Jena Gamble MD

## 2019-01-16 ENCOUNTER — TELEPHONE (OUTPATIENT)
Dept: FAMILY MEDICINE CLINIC | Facility: CLINIC | Age: 84
End: 2019-01-16

## 2019-01-16 NOTE — TELEPHONE ENCOUNTER
Dr Umesh Jiang pt is feeling alittle bit better   Pt can not take cough meds because it upsets stomach   Can pt take otc robatasin

## 2019-01-17 ENCOUNTER — TELEPHONE (OUTPATIENT)
Dept: FAMILY MEDICINE CLINIC | Facility: CLINIC | Age: 84
End: 2019-01-17

## 2019-01-17 DIAGNOSIS — R05.9 COUGH: Primary | ICD-10-CM

## 2019-01-17 RX ORDER — AZITHROMYCIN 250 MG/1
TABLET, FILM COATED ORAL
Qty: 6 TABLET | Refills: 0 | Status: SHIPPED | OUTPATIENT
Start: 2019-01-17 | End: 2019-01-18

## 2019-01-17 NOTE — TELEPHONE ENCOUNTER
Patient advised Z-pack sent to pharmacy and to call office immediately if symptoms change or worsen  Patient denies shortness of breath, itching or swelling in mouth, chest pain, throat closing, or any other symptoms other than rash that itches to both arms  Advised patient we would see her at any time should symptoms change or worsen  Patient consented to plan

## 2019-01-17 NOTE — TELEPHONE ENCOUNTER
Patient called to say that she has broken out in a rash  Patient states that it started off in her hands and now it is on legs and arms  Patient was advised to stop Keflex and to take benadryl for the itch  Patient states that the cough and congestion is still not better  Patient advised that Dr Carmelita Boss is out of the office today

## 2019-01-17 NOTE — TELEPHONE ENCOUNTER
I have sent in a ZPack for the patient  Please be sure this reaction isn't severe  If it worsens let me know and we can squeeze her in at anytime today

## 2019-01-18 ENCOUNTER — HOSPITAL ENCOUNTER (INPATIENT)
Facility: HOSPITAL | Age: 84
LOS: 3 days | Discharge: HOME HEALTH | DRG: 871 | End: 2019-01-21
Attending: EMERGENCY MEDICINE | Admitting: FAMILY MEDICINE
Payer: MEDICARE

## 2019-01-18 ENCOUNTER — APPOINTMENT (EMERGENCY)
Dept: RADIOLOGY | Facility: HOSPITAL | Age: 84
DRG: 871 | End: 2019-01-18
Payer: MEDICARE

## 2019-01-18 ENCOUNTER — OFFICE VISIT (OUTPATIENT)
Dept: FAMILY MEDICINE CLINIC | Facility: CLINIC | Age: 84
End: 2019-01-18
Payer: MEDICARE

## 2019-01-18 VITALS
WEIGHT: 133 LBS | TEMPERATURE: 97.9 F | HEIGHT: 61 IN | SYSTOLIC BLOOD PRESSURE: 120 MMHG | HEART RATE: 108 BPM | BODY MASS INDEX: 25.11 KG/M2 | RESPIRATION RATE: 18 BRPM | DIASTOLIC BLOOD PRESSURE: 70 MMHG

## 2019-01-18 DIAGNOSIS — I50.31 ACUTE DIASTOLIC CONGESTIVE HEART FAILURE (HCC): ICD-10-CM

## 2019-01-18 DIAGNOSIS — J18.9 MULTIFOCAL PNEUMONIA: ICD-10-CM

## 2019-01-18 DIAGNOSIS — J18.9 PNEUMONIA: ICD-10-CM

## 2019-01-18 DIAGNOSIS — A41.9 SEPSIS (HCC): Primary | ICD-10-CM

## 2019-01-18 DIAGNOSIS — R06.02 SOB (SHORTNESS OF BREATH): ICD-10-CM

## 2019-01-18 DIAGNOSIS — A41.9 SEPSIS, DUE TO UNSPECIFIED ORGANISM: ICD-10-CM

## 2019-01-18 DIAGNOSIS — R91.8 MULTILOBAR LUNG INFILTRATE: ICD-10-CM

## 2019-01-18 DIAGNOSIS — R00.2 PALPITATIONS: ICD-10-CM

## 2019-01-18 DIAGNOSIS — I10 BENIGN ESSENTIAL HYPERTENSION: ICD-10-CM

## 2019-01-18 DIAGNOSIS — E87.8 ELECTROLYTE ABNORMALITY: ICD-10-CM

## 2019-01-18 DIAGNOSIS — J18.9 SEPSIS DUE TO PNEUMONIA (HCC): ICD-10-CM

## 2019-01-18 DIAGNOSIS — I48.91 ATRIAL FIBRILLATION, UNSPECIFIED TYPE (HCC): ICD-10-CM

## 2019-01-18 DIAGNOSIS — A41.9 SEPSIS DUE TO PNEUMONIA (HCC): ICD-10-CM

## 2019-01-18 DIAGNOSIS — R42 DIZZINESS: Primary | ICD-10-CM

## 2019-01-18 DIAGNOSIS — I48.91 A-FIB (HCC): ICD-10-CM

## 2019-01-18 DIAGNOSIS — I48.91 ATRIAL FIBRILLATION WITH RAPID VENTRICULAR RESPONSE (HCC): ICD-10-CM

## 2019-01-18 DIAGNOSIS — T78.40XA ALLERGIC REACTION, INITIAL ENCOUNTER: ICD-10-CM

## 2019-01-18 DIAGNOSIS — N28.1 RENAL CYST: ICD-10-CM

## 2019-01-18 DIAGNOSIS — D75.839 THROMBOCYTOSIS: ICD-10-CM

## 2019-01-18 PROBLEM — I50.9 ACUTE CONGESTIVE HEART FAILURE (HCC): Status: ACTIVE | Noted: 2019-01-18

## 2019-01-18 LAB
ALBUMIN SERPL BCP-MCNC: 1.9 G/DL (ref 3.5–5)
ALP SERPL-CCNC: 115 U/L (ref 46–116)
ALT SERPL W P-5'-P-CCNC: 41 U/L (ref 12–78)
ANION GAP SERPL CALCULATED.3IONS-SCNC: 8 MMOL/L (ref 4–13)
AST SERPL W P-5'-P-CCNC: 50 U/L (ref 5–45)
BACTERIA UR QL AUTO: ABNORMAL /HPF
BASOPHILS # BLD MANUAL: 0 THOUSAND/UL (ref 0–0.1)
BASOPHILS NFR MAR MANUAL: 0 % (ref 0–1)
BILIRUB SERPL-MCNC: 0.9 MG/DL (ref 0.2–1)
BILIRUB UR QL STRIP: NEGATIVE
BUN SERPL-MCNC: 16 MG/DL (ref 5–25)
CALCIUM SERPL-MCNC: 8.1 MG/DL (ref 8.3–10.1)
CHLORIDE SERPL-SCNC: 97 MMOL/L (ref 100–108)
CLARITY UR: CLEAR
CO2 SERPL-SCNC: 28 MMOL/L (ref 21–32)
COLOR UR: ABNORMAL
CREAT SERPL-MCNC: 1.04 MG/DL (ref 0.6–1.3)
EOSINOPHIL # BLD MANUAL: 0 THOUSAND/UL (ref 0–0.4)
EOSINOPHIL NFR BLD MANUAL: 0 % (ref 0–6)
ERYTHROCYTE [DISTWIDTH] IN BLOOD BY AUTOMATED COUNT: 13 % (ref 11.6–15.1)
GFR SERPL CREATININE-BSD FRML MDRD: 49 ML/MIN/1.73SQ M
GLUCOSE SERPL-MCNC: 148 MG/DL (ref 65–140)
GLUCOSE UR STRIP-MCNC: NEGATIVE MG/DL
HCT VFR BLD AUTO: 43.9 % (ref 34.8–46.1)
HGB BLD-MCNC: 14.6 G/DL (ref 11.5–15.4)
HGB UR QL STRIP.AUTO: ABNORMAL
KETONES UR STRIP-MCNC: NEGATIVE MG/DL
LACTATE SERPL-SCNC: 1.7 MMOL/L (ref 0.5–2)
LEUKOCYTE ESTERASE UR QL STRIP: NEGATIVE
LG PLATELETS BLD QL SMEAR: PRESENT
LYMPHOCYTES # BLD AUTO: 0.43 THOUSAND/UL (ref 0.6–4.47)
LYMPHOCYTES # BLD AUTO: 2 % (ref 14–44)
MCH RBC QN AUTO: 31.2 PG (ref 26.8–34.3)
MCHC RBC AUTO-ENTMCNC: 33.3 G/DL (ref 31.4–37.4)
MCV RBC AUTO: 94 FL (ref 82–98)
MONOCYTES # BLD AUTO: 0.85 THOUSAND/UL (ref 0–1.22)
MONOCYTES NFR BLD: 4 % (ref 4–12)
NEUTROPHILS # BLD MANUAL: 20.04 THOUSAND/UL (ref 1.85–7.62)
NEUTS BAND NFR BLD MANUAL: 13 % (ref 0–8)
NEUTS SEG NFR BLD AUTO: 81 % (ref 43–75)
NITRITE UR QL STRIP: NEGATIVE
NON-SQ EPI CELLS URNS QL MICRO: ABNORMAL /HPF
NRBC BLD AUTO-RTO: 0 /100 WBCS
NT-PROBNP SERPL-MCNC: 1957 PG/ML
PH UR STRIP.AUTO: 7.5 [PH] (ref 5–9)
PLATELET # BLD AUTO: 469 THOUSANDS/UL (ref 149–390)
PLATELET BLD QL SMEAR: ABNORMAL
PMV BLD AUTO: 9.7 FL (ref 8.9–12.7)
POTASSIUM SERPL-SCNC: 3.5 MMOL/L (ref 3.5–5.3)
PROT SERPL-MCNC: 6.8 G/DL (ref 6.4–8.2)
PROT UR STRIP-MCNC: NEGATIVE MG/DL
RBC # BLD AUTO: 4.68 MILLION/UL (ref 3.81–5.12)
RBC #/AREA URNS AUTO: ABNORMAL /HPF
RBC MORPH BLD: NORMAL
SODIUM SERPL-SCNC: 133 MMOL/L (ref 136–145)
SP GR UR STRIP.AUTO: <=1.005 (ref 1–1.03)
TOTAL CELLS COUNTED SPEC: 100
TROPONIN I SERPL-MCNC: <0.02 NG/ML
TROPONIN I SERPL-MCNC: <0.02 NG/ML
UROBILINOGEN UR QL STRIP.AUTO: 0.2 E.U./DL
WBC # BLD AUTO: 21.32 THOUSAND/UL (ref 4.31–10.16)
WBC #/AREA URNS AUTO: ABNORMAL /HPF
WBC TOXIC VACUOLES BLD QL SMEAR: PRESENT

## 2019-01-18 PROCEDURE — 84484 ASSAY OF TROPONIN QUANT: CPT | Performed by: NURSE PRACTITIONER

## 2019-01-18 PROCEDURE — 84484 ASSAY OF TROPONIN QUANT: CPT

## 2019-01-18 PROCEDURE — 85027 COMPLETE CBC AUTOMATED: CPT

## 2019-01-18 PROCEDURE — 80053 COMPREHEN METABOLIC PANEL: CPT

## 2019-01-18 PROCEDURE — 96361 HYDRATE IV INFUSION ADD-ON: CPT

## 2019-01-18 PROCEDURE — 99285 EMERGENCY DEPT VISIT HI MDM: CPT

## 2019-01-18 PROCEDURE — 93005 ELECTROCARDIOGRAM TRACING: CPT

## 2019-01-18 PROCEDURE — 85007 BL SMEAR W/DIFF WBC COUNT: CPT

## 2019-01-18 PROCEDURE — 99214 OFFICE O/P EST MOD 30 MIN: CPT | Performed by: FAMILY MEDICINE

## 2019-01-18 PROCEDURE — 83880 ASSAY OF NATRIURETIC PEPTIDE: CPT | Performed by: EMERGENCY MEDICINE

## 2019-01-18 PROCEDURE — 87040 BLOOD CULTURE FOR BACTERIA: CPT | Performed by: EMERGENCY MEDICINE

## 2019-01-18 PROCEDURE — 71046 X-RAY EXAM CHEST 2 VIEWS: CPT

## 2019-01-18 PROCEDURE — 99223 1ST HOSP IP/OBS HIGH 75: CPT | Performed by: NURSE PRACTITIONER

## 2019-01-18 PROCEDURE — 83605 ASSAY OF LACTIC ACID: CPT | Performed by: EMERGENCY MEDICINE

## 2019-01-18 PROCEDURE — 87081 CULTURE SCREEN ONLY: CPT | Performed by: NURSE PRACTITIONER

## 2019-01-18 PROCEDURE — 81001 URINALYSIS AUTO W/SCOPE: CPT | Performed by: EMERGENCY MEDICINE

## 2019-01-18 PROCEDURE — 96365 THER/PROPH/DIAG IV INF INIT: CPT

## 2019-01-18 PROCEDURE — 93000 ELECTROCARDIOGRAM COMPLETE: CPT | Performed by: FAMILY MEDICINE

## 2019-01-18 PROCEDURE — 36415 COLL VENOUS BLD VENIPUNCTURE: CPT

## 2019-01-18 RX ORDER — PREDNISONE 20 MG/1
40 TABLET ORAL ONCE
Status: COMPLETED | OUTPATIENT
Start: 2019-01-18 | End: 2019-01-18

## 2019-01-18 RX ORDER — DIPHENHYDRAMINE HYDROCHLORIDE 50 MG/ML
INJECTION INTRAMUSCULAR; INTRAVENOUS
Status: COMPLETED
Start: 2019-01-18 | End: 2019-01-18

## 2019-01-18 RX ORDER — DIPHENHYDRAMINE HCL 25 MG
25 TABLET ORAL EVERY 6 HOURS PRN
Status: DISCONTINUED | OUTPATIENT
Start: 2019-01-18 | End: 2019-01-21 | Stop reason: HOSPADM

## 2019-01-18 RX ORDER — FUROSEMIDE 10 MG/ML
40 INJECTION INTRAMUSCULAR; INTRAVENOUS DAILY
Status: DISCONTINUED | OUTPATIENT
Start: 2019-01-18 | End: 2019-01-20

## 2019-01-18 RX ORDER — DOXYCYCLINE HYCLATE 100 MG/1
100 CAPSULE ORAL EVERY 12 HOURS SCHEDULED
Status: DISCONTINUED | OUTPATIENT
Start: 2019-01-18 | End: 2019-01-21 | Stop reason: HOSPADM

## 2019-01-18 RX ORDER — ACETAMINOPHEN 325 MG/1
650 TABLET ORAL EVERY 6 HOURS PRN
Status: DISCONTINUED | OUTPATIENT
Start: 2019-01-18 | End: 2019-01-20

## 2019-01-18 RX ORDER — DIPHENHYDRAMINE HYDROCHLORIDE 50 MG/ML
25 INJECTION INTRAMUSCULAR; INTRAVENOUS ONCE
Status: COMPLETED | OUTPATIENT
Start: 2019-01-18 | End: 2019-01-18

## 2019-01-18 RX ORDER — ONDANSETRON 2 MG/ML
4 INJECTION INTRAMUSCULAR; INTRAVENOUS EVERY 8 HOURS PRN
Status: DISCONTINUED | OUTPATIENT
Start: 2019-01-18 | End: 2019-01-21 | Stop reason: HOSPADM

## 2019-01-18 RX ADMIN — DIPHENHYDRAMINE HYDROCHLORIDE 25 MG: 50 INJECTION INTRAMUSCULAR; INTRAVENOUS at 18:34

## 2019-01-18 RX ADMIN — METOPROLOL TARTRATE 25 MG: 25 TABLET, FILM COATED ORAL at 23:20

## 2019-01-18 RX ADMIN — DOXYCYCLINE 100 MG: 100 CAPSULE ORAL at 23:18

## 2019-01-18 RX ADMIN — PREDNISONE 40 MG: 20 TABLET ORAL at 16:19

## 2019-01-18 RX ADMIN — DIPHENHYDRAMINE HYDROCHLORIDE 25 MG: 50 INJECTION, SOLUTION INTRAMUSCULAR; INTRAVENOUS at 18:34

## 2019-01-18 RX ADMIN — SODIUM CHLORIDE 1000 ML: 0.9 INJECTION, SOLUTION INTRAVENOUS at 16:15

## 2019-01-18 RX ADMIN — AZTREONAM 2000 MG: 2 INJECTION, POWDER, LYOPHILIZED, FOR SOLUTION INTRAMUSCULAR; INTRAVENOUS at 23:25

## 2019-01-18 RX ADMIN — PRAVASTATIN SODIUM: 40 TABLET ORAL at 23:19

## 2019-01-18 RX ADMIN — ENOXAPARIN SODIUM 60 MG: 60 INJECTION SUBCUTANEOUS at 23:21

## 2019-01-18 RX ADMIN — CEFEPIME HYDROCHLORIDE 2000 MG: 2 INJECTION, SOLUTION INTRAVENOUS at 17:02

## 2019-01-18 RX ADMIN — FUROSEMIDE 40 MG: 10 INJECTION, SOLUTION INTRAMUSCULAR; INTRAVENOUS at 23:23

## 2019-01-18 NOTE — PROGRESS NOTES
Chief Complaint   Patient presents with    Rash    Shortness of Breath        Patient ID: Leobardo Greenwood is a 80 y o  female  HPI  Pt is seeing for a rash started yesterday -  Was Rx Keflex 4 days ago -  Stopped med, started to developed SOB and dizziness few h ago -  No prior h/o Afib -  Cont to have cough but chest congestion is better     The following portions of the patient's history were reviewed and updated as appropriate: allergies, current medications, past family history, past medical history, past social history, past surgical history and problem list     Review of Systems   Constitutional: Positive for activity change and fatigue  HENT: Negative  Respiratory: Positive for cough and shortness of breath  Negative for chest tightness and wheezing  Cardiovascular: Positive for palpitations  Negative for chest pain and leg swelling  Gastrointestinal: Negative  Genitourinary: Negative  Musculoskeletal: Negative  Neurological: Positive for dizziness and weakness  Negative for seizures, light-headedness, numbness and headaches  Current Outpatient Prescriptions   Medication Sig Dispense Refill    alendronate (FOSAMAX) 70 mg tablet TAKE ONE TABLET BY MOUTH ONCE EVERY WEEK 12 tablet 3    Calcium Carbonate-Vitamin D (CALCIUM 600+D) 600-200 MG-UNIT TABS Take by mouth daily      ezetimibe-simvastatin (VYTORIN) 10-20 mg per tablet TAKE ONE TABLET BY MOUTH EVERY DAY AT BEDTIME 90 tablet 3    hydrochlorothiazide (HYDRODIURIL) 25 mg tablet Take 1 tablet (25 mg total) by mouth daily 90 tablet 3    lisinopril (ZESTRIL) 2 5 mg tablet Take 1 tablet (2 5 mg total) by mouth daily 90 tablet 3    promethazine-dextromethorphan (PHENERGAN-DM) 6 25-15 mg/5 mL oral syrup Take 5 mL by mouth daily (Patient not taking: Reported on 1/18/2019 ) 118 mL 0     No current facility-administered medications for this visit          Objective:    /70 (BP Location: Right arm, Patient Position: Sitting, Cuff Size: Standard)   Pulse (!) 108   Temp 97 9 °F (36 6 °C) (Tympanic)   Resp 18   Ht 5' 1" (1 549 m)   Wt 60 3 kg (133 lb)   BMI 25 13 kg/m²        Physical Exam   Constitutional: She is oriented to person, place, and time  No distress  Cardiovascular: An irregularly irregular rhythm present  Tachycardia present  No murmur heard  Pulmonary/Chest: Effort normal  No respiratory distress  She has no wheezes  She has no rales  Musculoskeletal: She exhibits no edema  Neurological: She is oriented to person, place, and time  No cranial nerve deficit  Skin: Rash (hives all over chest , arms, legs) noted                 Assessment/Plan:         Diagnoses and all orders for this visit:    Dizziness  -     POCT ECG    Palpitations  -     POCT ECG    SOB (shortness of breath)  -     POCT ECG    Allergic reaction, initial encounter    Atrial fibrillation, unspecified type ([facility] [address] )          Pt was sent to ER by EMT    rto chuy Redding MD

## 2019-01-18 NOTE — TELEPHONE ENCOUNTER
Patient called with update  Itching has improved in legs but still present in arms and legs  Patient may not be able to come in for appt today due to transportation  Advised patient to call with any changes or worsening sxs  Advised we are open tomorrow, pending the weather and open Monday as well  Per Dr Louisa Estevez, stop Vikram Harvey, continue with OTC benadryl and take zyrtec   Patient still not having SOB, throat swelling, etc

## 2019-01-19 ENCOUNTER — APPOINTMENT (INPATIENT)
Dept: RADIOLOGY | Facility: HOSPITAL | Age: 84
DRG: 871 | End: 2019-01-19
Payer: MEDICARE

## 2019-01-19 ENCOUNTER — APPOINTMENT (INPATIENT)
Dept: NON INVASIVE DIAGNOSTICS | Facility: HOSPITAL | Age: 84
DRG: 871 | End: 2019-01-19
Payer: MEDICARE

## 2019-01-19 PROBLEM — R21 RASH: Status: ACTIVE | Noted: 2019-01-19

## 2019-01-19 PROBLEM — E87.8 ELECTROLYTE ABNORMALITY: Status: ACTIVE | Noted: 2019-01-19

## 2019-01-19 LAB
ANION GAP SERPL CALCULATED.3IONS-SCNC: 8 MMOL/L (ref 4–13)
BASOPHILS # BLD AUTO: 0.04 THOUSANDS/ΜL (ref 0–0.1)
BASOPHILS NFR BLD AUTO: 0 % (ref 0–1)
BUN SERPL-MCNC: 17 MG/DL (ref 5–25)
CALCIUM SERPL-MCNC: 8.3 MG/DL (ref 8.3–10.1)
CHLORIDE SERPL-SCNC: 99 MMOL/L (ref 100–108)
CO2 SERPL-SCNC: 30 MMOL/L (ref 21–32)
CREAT SERPL-MCNC: 0.76 MG/DL (ref 0.6–1.3)
EOSINOPHIL # BLD AUTO: 0.01 THOUSAND/ΜL (ref 0–0.61)
EOSINOPHIL NFR BLD AUTO: 0 % (ref 0–6)
ERYTHROCYTE [DISTWIDTH] IN BLOOD BY AUTOMATED COUNT: 13.2 % (ref 11.6–15.1)
GFR SERPL CREATININE-BSD FRML MDRD: 72 ML/MIN/1.73SQ M
GLUCOSE SERPL-MCNC: 119 MG/DL (ref 65–140)
HCT VFR BLD AUTO: 43.5 % (ref 34.8–46.1)
HGB BLD-MCNC: 14.2 G/DL (ref 11.5–15.4)
IMM GRANULOCYTES # BLD AUTO: 0.26 THOUSAND/UL (ref 0–0.2)
IMM GRANULOCYTES NFR BLD AUTO: 2 % (ref 0–2)
L PNEUMO1 AG UR QL IA.RAPID: NEGATIVE
LYMPHOCYTES # BLD AUTO: 0.79 THOUSANDS/ΜL (ref 0.6–4.47)
LYMPHOCYTES NFR BLD AUTO: 5 % (ref 14–44)
MAGNESIUM SERPL-MCNC: 2.3 MG/DL (ref 1.6–2.6)
MCH RBC QN AUTO: 31.4 PG (ref 26.8–34.3)
MCHC RBC AUTO-ENTMCNC: 32.6 G/DL (ref 31.4–37.4)
MCV RBC AUTO: 96 FL (ref 82–98)
MONOCYTES # BLD AUTO: 0.61 THOUSAND/ΜL (ref 0.17–1.22)
MONOCYTES NFR BLD AUTO: 4 % (ref 4–12)
NEUTROPHILS # BLD AUTO: 14.48 THOUSANDS/ΜL (ref 1.85–7.62)
NEUTS SEG NFR BLD AUTO: 89 % (ref 43–75)
NRBC BLD AUTO-RTO: 0 /100 WBCS
PLATELET # BLD AUTO: 482 THOUSANDS/UL (ref 149–390)
PMV BLD AUTO: 9.5 FL (ref 8.9–12.7)
POTASSIUM SERPL-SCNC: 3.3 MMOL/L (ref 3.5–5.3)
RBC # BLD AUTO: 4.52 MILLION/UL (ref 3.81–5.12)
S PNEUM AG UR QL: NEGATIVE
SODIUM SERPL-SCNC: 137 MMOL/L (ref 136–145)
TROPONIN I SERPL-MCNC: <0.02 NG/ML
TSH SERPL DL<=0.05 MIU/L-ACNC: 0.73 UIU/ML (ref 0.36–3.74)
WBC # BLD AUTO: 16.19 THOUSAND/UL (ref 4.31–10.16)

## 2019-01-19 PROCEDURE — 93306 TTE W/DOPPLER COMPLETE: CPT

## 2019-01-19 PROCEDURE — 85025 COMPLETE CBC W/AUTO DIFF WBC: CPT | Performed by: NURSE PRACTITIONER

## 2019-01-19 PROCEDURE — 97535 SELF CARE MNGMENT TRAINING: CPT

## 2019-01-19 PROCEDURE — 80048 BASIC METABOLIC PNL TOTAL CA: CPT | Performed by: NURSE PRACTITIONER

## 2019-01-19 PROCEDURE — 99222 1ST HOSP IP/OBS MODERATE 55: CPT | Performed by: INTERNAL MEDICINE

## 2019-01-19 PROCEDURE — 97163 PT EVAL HIGH COMPLEX 45 MIN: CPT

## 2019-01-19 PROCEDURE — 84443 ASSAY THYROID STIM HORMONE: CPT | Performed by: NURSE PRACTITIONER

## 2019-01-19 PROCEDURE — 97110 THERAPEUTIC EXERCISES: CPT

## 2019-01-19 PROCEDURE — 83735 ASSAY OF MAGNESIUM: CPT | Performed by: NURSE PRACTITIONER

## 2019-01-19 PROCEDURE — G8979 MOBILITY GOAL STATUS: HCPCS

## 2019-01-19 PROCEDURE — 99232 SBSQ HOSP IP/OBS MODERATE 35: CPT | Performed by: STUDENT IN AN ORGANIZED HEALTH CARE EDUCATION/TRAINING PROGRAM

## 2019-01-19 PROCEDURE — 84145 PROCALCITONIN (PCT): CPT | Performed by: STUDENT IN AN ORGANIZED HEALTH CARE EDUCATION/TRAINING PROGRAM

## 2019-01-19 PROCEDURE — 84484 ASSAY OF TROPONIN QUANT: CPT | Performed by: NURSE PRACTITIONER

## 2019-01-19 PROCEDURE — 97167 OT EVAL HIGH COMPLEX 60 MIN: CPT

## 2019-01-19 PROCEDURE — G8978 MOBILITY CURRENT STATUS: HCPCS

## 2019-01-19 PROCEDURE — 99223 1ST HOSP IP/OBS HIGH 75: CPT | Performed by: INTERNAL MEDICINE

## 2019-01-19 PROCEDURE — 93306 TTE W/DOPPLER COMPLETE: CPT | Performed by: INTERNAL MEDICINE

## 2019-01-19 PROCEDURE — G8988 SELF CARE GOAL STATUS: HCPCS

## 2019-01-19 PROCEDURE — 87449 NOS EACH ORGANISM AG IA: CPT | Performed by: NURSE PRACTITIONER

## 2019-01-19 PROCEDURE — G8987 SELF CARE CURRENT STATUS: HCPCS

## 2019-01-19 PROCEDURE — 71250 CT THORAX DX C-: CPT

## 2019-01-19 RX ORDER — POTASSIUM CHLORIDE 20 MEQ/1
40 TABLET, EXTENDED RELEASE ORAL ONCE
Status: COMPLETED | OUTPATIENT
Start: 2019-01-19 | End: 2019-01-19

## 2019-01-19 RX ADMIN — PRAVASTATIN SODIUM: 40 TABLET ORAL at 22:18

## 2019-01-19 RX ADMIN — ENOXAPARIN SODIUM 60 MG: 60 INJECTION SUBCUTANEOUS at 22:00

## 2019-01-19 RX ADMIN — DOXYCYCLINE 100 MG: 100 CAPSULE ORAL at 11:08

## 2019-01-19 RX ADMIN — AZTREONAM 2000 MG: 2 INJECTION, POWDER, LYOPHILIZED, FOR SOLUTION INTRAMUSCULAR; INTRAVENOUS at 22:33

## 2019-01-19 RX ADMIN — ENOXAPARIN SODIUM 60 MG: 60 INJECTION SUBCUTANEOUS at 09:41

## 2019-01-19 RX ADMIN — DIPHENHYDRAMINE HCL 25 MG: 25 TABLET ORAL at 06:46

## 2019-01-19 RX ADMIN — METOPROLOL TARTRATE 25 MG: 25 TABLET, FILM COATED ORAL at 22:35

## 2019-01-19 RX ADMIN — DOXYCYCLINE 100 MG: 100 CAPSULE ORAL at 22:23

## 2019-01-19 RX ADMIN — FUROSEMIDE 40 MG: 10 INJECTION, SOLUTION INTRAMUSCULAR; INTRAVENOUS at 09:40

## 2019-01-19 RX ADMIN — AZTREONAM 2000 MG: 2 INJECTION, POWDER, LYOPHILIZED, FOR SOLUTION INTRAMUSCULAR; INTRAVENOUS at 09:41

## 2019-01-19 RX ADMIN — POTASSIUM CHLORIDE 40 MEQ: 1500 TABLET, EXTENDED RELEASE ORAL at 09:40

## 2019-01-19 RX ADMIN — AZTREONAM 2000 MG: 2 INJECTION, POWDER, LYOPHILIZED, FOR SOLUTION INTRAMUSCULAR; INTRAVENOUS at 14:52

## 2019-01-19 NOTE — CONSULTS
Consultation - Cardiology   Ave Amor 80 y o  female MRN: [id number]  Unit/Bed#: [address] [place] [address] Encounter: [id number]  01/19/19  5:51 PM          Physician Requesting Consult: Destin Benavidez MD  Reason for Consult / Principal Problem: atrial fibrillation      Assessment:  1  Atrial fibrillation - has converted to sinus rhythm spontaneously  - Discussed anticoagulation with patient in detail  Will begin Xarelto tomorrow   - Reviewed echocardiogram - normal LV function noted  2  Pneumonia - on antibiotics  3  Acute diastolic congestive heart failure - elevated LA filling pressure on echocardiogram  - IV lasix * 1 in AM  4  Dyslipidemia  5  Hypertension - BP stable    Plan:  As above      History of Present Illness   HPI: Ave Amor is a 80y o  year old female who presents with tachycardia  The patient recently had a URI with cough, sneezing and congestion  She was started on Keflex but stopped 2 days later due to a rash  She was feeling shortness of breath and went to see her PMD yesterday  She was noted to be in atrial fibrillation with RVR and was given Cardizem 15 mg IV with control of rate  Patient denies history of atrial fibrillation  She denies any chest pain, LE edema, orthopnea or PND  Atrial fibrillation resolved overnight  Review of Systems:    Review of Systems   Constitutional: Negative for chills, fatigue and fever  HENT: Negative for congestion, nosebleeds and postnasal drip  Respiratory: Positive for cough and shortness of breath  Negative for chest tightness  Cardiovascular: Positive for palpitations  Negative for chest pain and leg swelling  Gastrointestinal: Negative for abdominal distention, abdominal pain, diarrhea, nausea and vomiting  Endocrine: Negative for polydipsia, polyphagia and polyuria  Musculoskeletal: Negative for gait problem and myalgias  Skin: Negative for color change, pallor and rash     Allergic/Immunologic: Negative for environmental allergies, food allergies and immunocompromised state  Neurological: Negative for dizziness, seizures, syncope and light-headedness  Hematological: Negative for adenopathy  Does not bruise/bleed easily  Psychiatric/Behavioral: Negative for dysphoric mood  The patient is not nervous/anxious  Historical Information   Past Medical History:   Diagnosis Date    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[address] )     Community acquired pneumonia     last assessed: 10/11/16    Hyperlipidemia     Hypertension      Past Surgical History:   Procedure Laterality Date    APPENDECTOMY      BOWEL RESECTION      CATARACT EXTRACTION EXTRACAPSULAR W/ INTRAOCULAR LENS IMPLANTATION Bilateral     COLECTOMY      Partial    COLON SURGERY      colon cancer    COLONOSCOPY N/A 8/5/2016    Procedure: COLONOSCOPY;  Surgeon: Merrick Grubbs MD;  Location: [place] [address] GI LAB;   Service:     EYE SURGERY      HYSTERECTOMY      THROAT SURGERY      TRACHEAL SURGERY      tumor removed benign    TUMOR REMOVAL N/A 2000    trachea     History   Alcohol Use No     History   Drug use: Unknown     History   Smoking Status    Never Smoker   Smokeless Tobacco    Never Used       Family History:   Family History   Problem Relation Age of Onset    Diabetes Sister     Hypertension Sister        Meds/Allergies   current meds:   Current Facility-Administered Medications   Medication Dose Route Frequency    acetaminophen (TYLENOL) tablet 650 mg  650 mg Oral Q6H PRN    aztreonam (AZACTAM) 2,000 mg in sodium chloride 0 9 % 100 mL IVPB  2,000 mg Intravenous Q8H    diphenhydrAMINE (BENADRYL) tablet 25 mg  25 mg Oral Q6H PRN    doxycycline hyclate (VIBRAMYCIN) capsule 100 mg  100 mg Oral Q12H [facility]    enoxaparin (LOVENOX) subcutaneous injection 60 mg  1 mg/kg Subcutaneous Q12H [facility]    ezetimibe 10 mg-pravastatin 40 mg combo dose   Oral HS    furosemide (LASIX) injection 40 mg  40 mg Intravenous Daily    metoprolol tartrate (LOPRESSOR) tablet 25 mg  25 mg Oral BID  ondansetron (ZOFRAN) injection 4 mg  4 mg Intravenous Q8H PRN     Allergies   Allergen Reactions    Azithromycin     Niacin And Related     Keflex [Cephalexin] Rash       Objective   Vitals: Blood pressure 128/60, pulse 75, temperature 97 5 °F (36 4 °C), resp  rate 18, height 5' 1" (1 549 m), weight 60 kg (132 lb 4 4 oz), SpO2 96 %  , Body mass index is 24 99 kg/m²  Physical Exam   Constitutional: She appears healthy  No distress  HENT:   Nose: Nose normal    Mouth/Throat: Oropharynx is clear  Neck: Neck supple  No JVD present  Cardiovascular: Normal rate and regular rhythm  Exam reveals no distant heart sounds and no friction rub  Murmur heard  Pulmonary/Chest: Effort normal and breath sounds normal  She has no wheezes  She has no rales  She exhibits no tenderness  Abdominal: Soft  She exhibits no distension  There is no tenderness  Musculoskeletal: She exhibits no edema  Neurological: She is alert and oriented to person, place, and time  Skin: Skin is warm and dry  No rash noted         Lab Results:     Troponins:   Results from last 7 days  Lab Units 01/19/19  0122 01/18/19  2258 01/18/19  1520   TROPONIN I ng/mL <0 02 <0 02 <0 02       CBC with diff:   Results from last 7 days  Lab Units 01/19/19  0641 01/18/19  1520   WBC Thousand/uL 16 19* 21 32*   HEMOGLOBIN g/dL 14 2 14 6   HEMATOCRIT % 43 5 43 9   MCV fL 96 94   PLATELETS Thousands/uL 482* 469*   MCH pg 31 4 31 2   MCHC g/dL 32 6 33 3   RDW % 13 2 13 0   MPV fL 9 5 9 7   NRBC AUTO /100 WBCs 0 0       CMP:   Results from last 7 days  Lab Units 01/19/19  0641 01/18/19  1520   POTASSIUM mmol/L 3 3* 3 5   CHLORIDE mmol/L 99* 97*   CO2 mmol/L 30 28   BUN mg/dL 17 16   CREATININE mg/dL 0 76 1 04   CALCIUM mg/dL 8 3 8 1*   AST U/L  --  50*   ALT U/L  --  41   ALK PHOS U/L  --  115   EGFR ml/min/1 73sq m 72 49       Magnesium:   Results from last 7 days  Lab Units 01/19/19  0641   MAGNESIUM mg/dL 2 3       Coags:       Lipid Profile: Cardiac testing:   Results for orders placed during the hospital encounter of 19   Echo complete with contrast if indicated    Narrative [address]  [address] [facility]  [place]   [phone]    Transthoracic Echocardiogram  2D, M-mode, Doppler, and Color Doppler    Study date:  2019    Patient: Shira Jiang  MR number: [id number]  Account number: [de-identified]  : 1933  Age: 80 years  Gender: Female  Status: Inpatient  Location: Bedside  Height: 61 in  Weight: 134 6 lb  BP: 117/ 55 mmHg    Indications: ATRIAL FIBRILLATION    Diagnoses: I48 0 - Atrial fibrillation    Primary Physician:  Shravan Brooke MD  Interpreting Physician:  DO ROEL Shane    LEFT VENTRICLE:  Systolic function was normal by visual assessment  Ejection fraction was estimated to be 60 %  There were no regional wall motion abnormalities  There was mild concentric hypertrophy  Doppler parameters were consistent with elevated mean left atrial filling pressure  MITRAL VALVE:  There was mild regurgitation  AORTIC VALVE:  There was no evidence for stenosis  There was mild regurgitation  TRICUSPID VALVE:  There was mild regurgitation  Pulmonary artery systolic pressure was mildly increased  PULMONIC VALVE:  There was mild regurgitation  HISTORY: PRIOR HISTORY: MURMUR    PROCEDURE: The procedure was performed at the bedside  This was a routine study  The transthoracic approach was used  The study included complete 2D imaging, M-mode, complete spectral Doppler, and color Doppler  The heart rate was 68 bpm,  at the start of the study  Image quality was adequate  LEFT VENTRICLE: Size was normal  Systolic function was normal by visual assessment  Ejection fraction was estimated to be 60 %  There were no regional wall motion abnormalities  There was mild concentric hypertrophy   DOPPLER: Doppler  parameters were consistent with elevated mean left atrial filling pressure  RIGHT VENTRICLE: The size was normal  Systolic function was normal  DOPPLER: Systolic pressure was within the normal range  LEFT ATRIUM: Size was normal  No thrombus was identified  RIGHT ATRIUM: Size was normal     MITRAL VALVE: There was annular calcification  Valve structure was normal  There was normal leaflet separation  No echocardiographic evidence for prolapse  DOPPLER: The transmitral velocity was within the normal range  There was no  evidence for stenosis  There was mild regurgitation  AORTIC VALVE: The valve was trileaflet  Leaflets exhibited normal thickness, mild calcification, mildly reduced cuspal separation, and sclerosis  DOPPLER: Transaortic velocity was within the normal range  There was no evidence for  stenosis  There was mild regurgitation  TRICUSPID VALVE: The valve structure was normal  There was normal leaflet separation  DOPPLER: The transtricuspid velocity was within the normal range  There was mild regurgitation  Pulmonary artery systolic pressure was mildly increased  Estimated peak PA pressure was 44 mmHg  PULMONIC VALVE: Leaflets exhibited normal thickness, no calcification, and normal cuspal separation  DOPPLER: The transpulmonic velocity was within the normal range  There was mild regurgitation  PERICARDIUM: There was no thickening  There was no pericardial effusion  AORTA: The root exhibited normal size      PULMONARY ARTERY: The size was normal  The morphology appeared normal     SYSTEM MEASUREMENT TABLES    2D mode  AoR Diam 2D: 3 1 cm  LA Diam (2D): 3 4 cm  LA/Ao (2D): 1 1  FS (2D Teich): 35 5 %  IVSd (2D): 0 86 cm  LVDEV: 66 3 cm³  LVESV: 22 8 cm³  LVIDd(2D): 3 91 cm  LVISd (2D): 2 52 cm  LVOT Area 2D: 2 01 cm squared  LVPWd (2D): 0 62 cm  SV (Teich): 43 5 cm³    Apical four chamber  LVEF A4C: 77 %    Unspecified Scan Mode  INGRIS Cont Eq (Peak Mamadou): 1 32 cm squared  INGRIS Cont Eq (VTI): 1 1 cm squared  LVOT (VTI): 25 8 cm  LVOT Diam : 1 6 cm  LVOT Vmax: 1290 mm/s  LVOT Vmax; Mean: 1290 mm/s  Peak Grad ; Mean: 7 mm[Hg]  SV (LVOT): 52 cm³  VTI;Mean: 4 mm[Hg]  INGRIS Cont Eq (VTI): 1 93 cm squared  MV Peak A Mamadou: 780 mm/s  MV Peak E Mamadou   Mean: 982 mm/s  RVSP: 39 mm[Hg]  Max P mm[Hg]  V Max: 3290 mm/s  Vmax: 2990 mm/s  TAPSE: 1 8 cm    [facility] Accredited Echocardiography Laboratory    Prepared and electronically signed by    Perez Golden DO  Signed 2019 11:45:31         EKG: Personally reviewed: Sinus rhythm with PACs    Imaging: I have personally reviewed pertinent films in PACS

## 2019-01-19 NOTE — ASSESSMENT & PLAN NOTE
A/e/b leukocytosis with bandemia, tachycardia  pneumonia, failed outpatient therapy    Chest x-ray revealed a right apical nodule  CT chest shows multifocal pneumonia  · Pt had an allergic reaction to azithromycin vs cefepime in the ER - continue doxycyline and azactam  · urine for strep and legionella pending  · Blood cultures pending  · Serial procalcitonins ordered  · Pulmonary consulted, recs appreciated

## 2019-01-19 NOTE — UTILIZATION REVIEW
Initial Clinical Review    Admission: Date/Time/Statement: 1/18/19 @ 1732   Orders Placed This Encounter   Procedures    Inpatient Admission (expected length of stay for this patient is greater than two midnights)     Standing Status:   Standing     Number of Occurrences:   1     Order Specific Question:   Admitting Physician     Answer:   Natacha Albert [[id number]]     Order Specific Question:   Level of Care     Answer:   Med Surg [[id number]]     Order Specific Question:   Estimated length of stay     Answer:   More than 2 Midnights     Order Specific Question:   Certification     Answer:   I certify that inpatient services are medically necessary for this patient for a duration of greater than two midnights  See H&P and MD Progress Notes for additional information about the patient's course of treatment  ED: Date/Time/Mode of Arrival:   ED Arrival Information     Expected Arrival Acuity Means of Arrival Escorted By Service Admission Type    - 1/18/2019 14:57 [facility] Emergency    Arrival Complaint    rapid heart rate        Chief Complaint:   Chief Complaint   Patient presents with    Atrial Fibrillation     patient went to her PCP for a rash she developed while taking Keflex  Found to be in afib 150-160s, with no history of afib  Paramedics started a line, gave diltiazem 15mg, and a fluid bolus in the field  Patient arrives alert, oriented, speaking in full sentences in no accute distress  History of Illness:     80year old female presents to Ed  For evaluation of  Cough, dyspnea and arrhythmia  Her PCP prescribed keflex which she took for 2 days before developing a rash  Now changed to an alternate po antibiotic but symptoms are worsening    Ems found patient in atrial fibrillation which is new       ED Vital Signs:   01/18/19 1517 01/18/19 1509 01/18/19 1509 01/18/19 1509 01/18/19 1509   98 6 °F (37 °C) 105 20 104/54 94 %      No Pain       01/18/19 60 kg (132 lb 4 4 oz) Vital Signs (abnormal):       Temp 96 2     Heart rate  105   104  104    Respiratory rate  22   22   25     93%  O2 sat ra      95%  on 2L O2 nc       Pertinent Labs/Diagnostic Test Results:     WBC  21 32    BNP   1,957        CT  CHEST   1   Scattered patchy consolidation infiltrates bilaterally compatible with multifocal pneumonia   Recommend follow-up to resolution   Follow-up chest CT recommended in 6-8 weeks  2  Enlarged right pericardiac lymph node, question reactive   This can be reassessed on follow-up  3   New small right pleural effusion  4   Partially visualized large cyst in the left upper quadrant likely of renal origin   This could be followed up with renal ultrasound  ED Treatment:   Medication Administration from 01/18/2019 1457 to 01/18/2019 2014       Date/Time Order Dose Route     01/18/2019 1615 sodium chloride 0 9 % bolus 1,000 mL 1,000 mL Intravenous     01/18/2019 1619 predniSONE tablet 40 mg 40 mg Oral     01/18/2019 1702 cefepime (MAXIPIME) 2 g/50 mL dextrose IVPB 2,000 mg Intravenous     01/18/2019 1834 diphenhydrAMINE (BENADRYL) injection 25 mg 25 mg Intravenous     Past Medical/Surgical History:     Diagnosis    Benign essential hypertension    Other hyperlipidemia    Macular degeneration    Post-menopausal osteoporosis         Admitting Diagnosis:     Atrial fibrillation ([address] ) [I48 91]  A-fib ([address] ) [I48 91]  Pneumonia [J18 9]  Sepsis ([facility] [address] ) [A41 9]       Age/Sex: 80 y o  female       Assessment/Plan:     Sepsis due to pneumonia [facility])   Assessment & Plan     As evidenced by [place] [address] with bandemia, tachycardia  Patient presented to the ED with complaints of not feeling well since Monday - cough, sneeze, productive cough associated with some shortness of breath  She saw her primary care doctor and took 2 days worth of Keflex then broke out in a diffuse pruritic rash    She called her primary care doctor in the antibiotic was changed but she is unsure what it was changed to  She returned to her primary care doctor's office today because she was feeling worse and was found to be in rapid AFib  Chest x-ray revealed a right apical nodule which radiology thought could potentially be PNA - no other source    · Admit to medicine  · Pt had an allergic reaction to azithromycin vs cefepime in the ER - at this point will place patient on doxycyline and azactam  · Send urine for strep and legionella  · Blood cultures pending  · Send procalcitonin and trend daily  · Supportive therapies with duonebs, respiratory protocol  · CT chest      Atrial fibrillation with rapid ventricular response [facility])   Assessment & Plan     Patient found to be in rapid atrial fibrillation in her PCPs office, in the ER rate is in the 80s after paramedics gave 15 mg of IV Cardizem  · Will start low-dose beta-blocker for rate control  · Lovenox 1 milligram/kilogram q 12 hours  · Echocardiogram  · Cardiology consultation  · Add on TSH and Mag         Acute congestive heart failure (HCC)   Assessment & Plan     BP elevated at 1900, increasing shortness of breath with exertion, received 1 L NS bolus by medics - do suspect some component of volume overload  · Start Lasix 40 mg IV push daily  · Echocardiogram  · Cardiology consultation  · Daily weights  · Intake and output         Admission Orders:    CONSULT PULMONARY  CONSULT CARDIOLOGY  PT AND OT EVAL AND TREAT  ASPIRATION PRECAUTIONS  TELEMETRY   OXYGEN  2L    ECHO        acetaminophen 650 mg Oral Q6H PRN   aztreonam 2,000 mg Intravenous Q8H   diphenhydrAMINE 25 mg Oral Q6H PRN   doxycycline hyclate 100 mg Oral Q12H SRINI   enoxaparin 1 mg/kg Subcutaneous Q12H [address]   ezetimibe 10 mg-pravastatin 40 mg combo dose  Oral HS   furosemide 40 mg Intravenous Daily   metoprolol tartrate 25 mg Oral BID   ondansetron 4 mg Intravenous Q8H PRN

## 2019-01-19 NOTE — PLAN OF CARE
CARDIOVASCULAR - ADULT     Maintains optimal cardiac output and hemodynamic stability Progressing     Absence of cardiac dysrhythmias or at baseline rhythm Progressing        DISCHARGE PLANNING     Discharge to home or other facility with appropriate resources Progressing        GASTROINTESTINAL - ADULT     Minimal or absence of nausea and/or vomiting Progressing     Maintains or returns to baseline bowel function Progressing     Maintains adequate nutritional intake Progressing     Establish and maintain optimal ostomy function Progressing        GENITOURINARY - ADULT     Maintains or returns to baseline urinary function Progressing     Absence of urinary retention Progressing     Urinary catheter remains patent Progressing        HEMATOLOGIC - ADULT     Maintains hematologic stability Progressing        INFECTION - ADULT     Absence or prevention of progression during hospitalization Progressing        Knowledge Deficit     Patient/family/caregiver demonstrates understanding of disease process, treatment plan, medications, and discharge instructions Progressing        METABOLIC, FLUID AND ELECTROLYTES - ADULT     Electrolytes maintained within normal limits Progressing     Fluid balance maintained Progressing     Glucose maintained within target range Progressing        MUSCULOSKELETAL - ADULT     Maintain or return mobility to safest level of function Progressing     Maintain proper alignment of affected body part Progressing        NEUROSENSORY - ADULT     Achieves stable or improved neurological status Progressing     Absence of seizures Progressing     Remains free of injury related to seizures activity Progressing     Achieves maximal functionality and self care Progressing        PAIN - ADULT     Verbalizes/displays adequate comfort level or baseline comfort level Progressing        Potential for Falls     Patient will remain free of falls Progressing        RESPIRATORY - ADULT     Achieves optimal ventilation and oxygenation Progressing        SAFETY ADULT     Maintain or return to baseline ADL function Progressing     Maintain or return mobility status to optimal level Progressing     Patient will remain free of falls Progressing        SKIN/TISSUE INTEGRITY - ADULT     Skin integrity remains intact Progressing     Incision(s), wounds(s) or drain site(s) healing without S/S of infection Progressing     Oral mucous membranes remain intact Progressing

## 2019-01-19 NOTE — OCCUPATIONAL THERAPY NOTE
Occupational Therapy Evaluation/Treatment     01/19/19 1215   Note Type   Note type Eval/Treat   Restrictions/Precautions   Other Precautions Chair Alarm; Bed Alarm; Fall Risk;O2   Pain Assessment   Pain Assessment No/denies pain   Home Living   Type of Home Apartment   Home Layout One level  (1 full flight to enter )   Bathroom Shower/Tub Tub/shower unit   [address] chair   Home Equipment Cane   Additional Comments pt doesnt use an assistive device for mobility    Prior Function   Level of [place] Independent with ADLs and functional mobility   Lives With Alone   Receives Help From Family;Friend(s)   ADL Assistance Independent   IADLs Independent   Comments patient doesnt drive due to macular degeneration, friends assist with transportation   ADL   Eating Assistance 5  Supervision/Setup   Grooming Assistance 5  Supervision/Setup   UB Bathing Assistance 5  Supervision/Setup   LB Bathing Assistance 4  Minimal Assistance   UB Dressing Assistance 5  Supervision/Setup   LB Dressing Assistance 4  [address]  5  Supervision/Setup   Bed Mobility   Supine to Sit 5  Supervision   Transfers   Sit to Stand 4  Minimal assistance   Stand to Sit 4  Minimal assistance   Stand pivot 4  Minimal assistance   Functional Mobility   Functional Mobility 4  Minimal assistance   Additional Comments 15 feet   Additional items Hand hold assistance   Balance   Static Sitting Fair +   Dynamic Sitting Fair   Static Standing Fair   Dynamic Standing Fair -   Activity Tolerance   Activity Tolerance Patient limited by fatigue   Medical Staff Made Aware social work of discharge plan home with services   RUE Assessment   RUE Assessment WFL  (4-/5)   LUE Assessment   LUE Assessment WFL  (4-/5)   Vision - Complex Assessment   Additional Comments pt unable to read ID badvanessa, pt reports using a magnifing glass to read at home    Patient with history of macular degeneration   Cognition   Overall Cognitive Status WFL   Arousal/Participation Cooperative   Attention Within functional limits   Orientation Level Oriented X4   Following Commands Follows all commands and directions without difficulty   Assessment   Limitation Decreased ADL status; Decreased UE strength;Decreased Safe judgement during ADL;Decreased endurance;Decreased self-care trans;Decreased high-level ADLs   Prognosis Good   Assessment Patient evaluated by Occupational Therapy  Patient admitted with Sepsis due to pneumonia [facility])  The patients occupational profile, medical and therapy history includes a extensive additional review of physical, cognitive, or psychosocial history related to current functional performance  Comorbidities affecting functional mobility and ADLS include: hypertension, macular degeneration and cancer  Prior to admission, patient was independent with functional mobility without assistive device, independent with ADLS and independent with IADLS  The evaluation identifies the following performance deficits: weakness, impaired balance, decreased endurance, increased fall risk, new onset of impairment of functional mobility, decreased ADLS, decreased IADLS, decreased activity tolerance, decreased safety awareness, SOB upon exertion and decreased strength, that result in activity limitations and/or participation restrictions  This evaluation requires clinical decision making of high complexity, because the patient presents with comorbidites that affect occupational performance and required significant modification of tasks or assistance with consideration of multiple treatment options  The Barthel Index was used as a functional outcome tool presenting with a score of 55, indicating marked limitations of functional mobility and ADLS  Patient will benefit from skilled Occupational Therapy services to address above deficits and facilitate a safe return to prior level of function     Goals   Patient Goals to go home   STG Time Frame (1-7 days)   Short Term Goal  Goals established to promote patient goal of going home:  Patient will increase standing tolerance to 3 minutes during ADL task to decrease assistance level and decrease fall risk; Patient will increase bed mobility to independent in preparation for ADLS and transfers; Patient will increase functional mobility to and from bathroom with single point cane with supervision to increase performance with ADLS and to use a toilet; Patient will tolerate 10 minutes of UE ROM/strengthening to increase general activity tolerance and performance in ADLS/IADLS; Patient will improve functional activity tolerance to 10 minutes of sustained functional tasks to increase participation in basic self-care and decrease assistance level;  Patient will be able to to verbalize understanding and perform energy conservation/proper body mechanics during ADLS and functional mobility at least 75% of the time with minimal cueing to decrease signs of fatigue and increase stamina to return to prior level of function;  Patient will increase dynamic standing balance to fair to improve postural stability and decrease fall risk during standing ADLS and transfers       LTG Time Frame (8-14 days)   Long Term Goal Goals established to promote patient goal of going home:  Patient will increase standing tolerance to 6 minutes during ADL task to decrease assistance level and decrease fall risk;  Patient will increase functional mobility to and from bathroom with single point cane independently to increase performance with ADLS and to use a toilet; Patient will tolerate 20 minutes of UE ROM/strengthening to increase general activity tolerance and performance in ADLS/IADLS; Patient will improve functional activity tolerance to 20 minutes of sustained functional tasks to increase participation in basic self-care and decrease assistance level;  Patient will be able to to verbalize understanding and perform energy conservation/proper body mechanics during ADLS and functional mobility at least 90% of the time without cueing to decrease signs of fatigue and increase stamina to return to prior level of function;  Patient will increase dynamic standing balance to fair+ to improve postural stability and decrease fall risk during standing ADLS and transfers  Functional Transfer Goals   Pt Will Perform All Functional Transfers (STG supervision LTG independent )   ADL Goals   Pt Will Perform Bathing (STG supervision LTG independent )   Pt Will Perform LE Dressing (STG supervision LTG independent )   Pt Will Perform Toileting (STG independent )   Plan   Treatment Interventions ADL retraining;Functional transfer training;UE strengthening/ROM; Endurance training;Patient/family training;Equipment evaluation/education; Activityengagement; Compensatory technique education   Goal Expiration Date 02/02/19   OT Frequency 3-5x/wk   Additional Treatment Session   Start Time 4681   End Time 0816   Treatment Assessment Toilet transfer with supervision, hygiene with supervision  Patient stood at sink with min assist to wash hands for balance with setup  Functional mobility 15 feet, rest then 60 feet with min assist   Patient would benefit from use of a cane, balance improved in [place] with use of rail  Stand to sit supervision  Sit to supine supervision  Tolerated well  Cooperative and pleasant  SpO2 95% with O2 for mobility in [place]  90% on room air after use of bathroom      Recommendation   OT Discharge Recommendation Home OT   Barthel Index   Feeding 10   Bathing 0   Grooming Score 0   Dressing Score 5   Bladder Score 10   Bowels Score 10   Toilet Use Score 5   Transfers (Bed/Chair) Score 10   Mobility (Level Surface) Score 0   Stairs Score 5   Barthel Index Score 54   Licensure   [place] License Number  Bhavin Parks Ariel [address] 87 OTR/L [id number]

## 2019-01-19 NOTE — PHYSICAL THERAPY NOTE
PT EVALUATION   01/19/19 0635   Pain Assessment   Pain Assessment No/denies pain   Home Living   Type of [address] One level;Stairs to enter with rails  (full flight to enter)   [address] [place] [address] chair   Home Equipment Cane   Additional Comments patient independent without assistive device prior to admission   Prior Function   Level of [place] Independent with ADLs and functional mobility   Lives With Alone   Receives Help From Friend(s)   ADL Assistance Independent   IADLs Independent   Comments patient unable to drive due to visual deficits but with transportation completes her own food shopping prior to admission   Restrictions/Precautions   Other Precautions Chair Alarm; Bed Alarm; Fall Risk   General   Additional Pertinent History chart reviewed, patient admitted with afib, sepsis, pneumonia  Patient independent prior to admission and now requiring assist of one person with assistive device for safe transfers and gait   Family/Caregiver Present No   Cognition   Overall Cognitive Status WFL   Arousal/Participation Cooperative   Attention Within functional limits   Orientation Level Oriented X4   Following Commands Follows all commands and directions without difficulty   RLE Assessment   RLE Assessment (ROM WFL, strength 3+/4-)   LLE Assessment   LLE Assessment (ROM WFL, strength 3+/4-)   Coordination   Movements are Fluid and Coordinated 0   Bed Mobility   Supine to Sit 5  Supervision   Sit to Supine 7  Independent   Transfers   Sit to Stand 4  Minimal assistance   Additional items Assist x 1   Stand to Sit 4  Minimal assistance   Additional items Assist x 1   Ambulation/Elevation   Gait Assistance (min to mod assist )   Additional items Assist x 1;Verbal cues; Tactile cues   Assistive Device (none)   Distance 20 feet with change in direction   Balance   Static Sitting Fair +   Dynamic Sitting Fair   Static Standing Fair -   Dynamic Standing Poor   Ambulatory Poor   Activity Tolerance   Activity Tolerance Patient limited by fatigue   Nurse Made Aware yes   Assessment   Prognosis Good   Problem List Decreased strength;Decreased range of motion;Decreased endurance; Impaired balance;Decreased mobility; Decreased coordination   Assessment Patient seen for Physical Therapy evaluation  Patient admitted with Sepsis due to pneumonia [facility])  Comorbidities affecting patient's physical performance include: colon cancer, hypertension, hyperlipidemia who presents with cough, shortness of breath, arrhythmia, macular degeneration   Personal factors affecting patient at time of initial evaluation include: stairs to enter home, inability to ambulate household distances, inability to navigate community distances, inability to navigate level surfaces without external assistance, inability to perform dynamic tasks in community, limited home support, inability to perform caregiver support/tasks, inability to perform physical activity, inability to perform ADLS and inability to perform IADLS   Prior to admission, patient was independent with functional mobility without assistive device, independent with ADLS, independent with IADLS, living in a multi-level home, ambulating household distance, ambulating community distances and home with family assist   Please find objective findings from Physical Therapy assessment regarding body systems outlined above with impairments and limitations including weakness, decreased ROM, impaired balance, decreased endurance, impaired coordination, gait deviations, decreased activity tolerance, decreased functional mobility tolerance, fall risk and SOB upon exertion  The Barthel Index was used as a functional outcome tool presenting with a score of 55 today indicating marked limitations of functional mobility and ADLS    Patient's clinical presentation is currently unstable/unpredictable as seen in patient's presentation of vital sign response, increased fall risk, new onset of impairment of functional mobility, decreased endurance and new onset of weakness  Pt would benefit from continued Physical Therapy treatment to address deficits as defined above and maximize level of functional mobility  As demonstrated by objective findings, the assigned level of complexity for this evaluation is high  Goals   Patient Goals go home soon   STG Expiration Date 01/26/19   Short Term Goal #1 transfers and gait with cane with supervision   Short Term Goal #2 gait endurance to functional household distances, strength BLEs 4-/5 all to meet patient goal of returning home   LTG Expiration Date 02/02/19   Long Term Goal #1 transfers and gait without assistive device independently   Long Term Goal #2 gait endurance to functional community distances   Plan   Treatment/Interventions ADL retraining;Functional transfer training;LE strengthening/ROM; Elevations; Therapeutic exercise; Endurance training;Patient/family training;Equipment eval/education; Bed mobility;Gait training; Compensatory technique education   PT Frequency 5x/wk   Recommendation   Recommendation Home PT   Barthel Index   Feeding 10   Bathing 0   Grooming Score 0   Dressing Score 5   Bladder Score 10   Bowels Score 10   Toilet Use Score 5   Transfers (Bed/Chair) Score 10   Mobility (Level Surface) Score 0   Stairs Score 5   Barthel Index Score 54   Licensure   [place] License Number  Ricky Barbi PT [id number]     PT TREATMENT  Time In:1455  Time Out:1505  Total Time: 10min      S:  No pain complaints  O:  -gait with cane with min assist of 1 and training for proper use, endurance 60 feet x 2  -up and down stairs with cane and one rail on R, step to patterning and min assist of 1  A:  Pt cooperative and motivated and will benefit from continued PT with progression as tolerated  P:  DC recommendation: home PT    Juana Castle, PT

## 2019-01-19 NOTE — ASSESSMENT & PLAN NOTE
BP elevated at 1900, increasing shortness of breath with exertion, received 1 L NS bolus by medics - do suspect some component of volume overload  · Start Lasix 40 mg IV push daily  · Echocardiogram  · Cardiology consultation  · Daily weights  · Intake and output

## 2019-01-19 NOTE — H&P
H&P- Joel Guardado 1933, 80 y o  female MRN: [id number]    Unit/Bed#: [address] [place] [address] Encounter: [id number]    Primary Care Provider: Sommer Escalante MD   Date and time admitted to hospital: 1/18/2019  2:59 PM        * Sepsis due to pneumonia [facility])   Assessment & Plan    As evidenced by a [place] count of 97[address] with bandemia, tachycardia  Patient presented to the ED with complaints of not feeling well since Monday - cough, sneeze, productive cough associated with some shortness of breath  She saw her primary care doctor and took 2 days worth of Keflex then broke out in a diffuse pruritic rash  She called her primary care doctor in the antibiotic was changed but she is unsure what it was changed to  She returned to her primary care doctor's office today because she was feeling worse and was found to be in rapid AFib  Chest x-ray revealed a right apical nodule which radiology thought could potentially be PNA - no other source    · Admit to medicine  · Pt had an allergic reaction to azithromycin vs cefepime in the ER - at this point will place patient on doxycyline and azactam  · Send urine for strep and legionella  · Blood cultures pending  · Send procalcitonin and trend daily  · Supportive therapies with duonebs, respiratory protocol  · CT chest     Atrial fibrillation with rapid ventricular response [facility])   Assessment & Plan    Patient found to be in rapid atrial fibrillation in her PCPs office, in the ER rate is in the 80s after paramedics gave 15 mg of IV Cardizem  · Will start low-dose beta-blocker for rate control  · Lovenox 1 milligram/kilogram q 12 hours  · Echocardiogram  · Cardiology consultation  · Add on TSH and Mag       Acute congestive heart failure ([facility] [address] )   Assessment & Plan    BP elevated at 1900, increasing shortness of breath with exertion, received 1 L NS bolus by medics - do suspect some component of volume overload  · Start Lasix 40 mg IV push daily  · Echocardiogram  · Cardiology consultation  · Daily weights  · Intake and output     Other hyperlipidemia   Assessment & Plan    Continue statin  Lipid panel in AM     Benign essential hypertension   Assessment & Plan    Hold lisinopril and hydrochlorothiazide for now, add metoprolol       VTE Prophylaxis: Enoxaparin (Lovenox)  / sequential compression device   Code Status: Level 3 - DNAR and DNI    Anticipated Length of Stay:  Patient will be admitted on an Inpatient basis with an anticipated length of stay of  Greater than 2 midnights  Justification for Hospital Stay: sepsis secondary to PNA, new onset atrial fibrillation, suspect volume overload    Total Time for Visit, including Counseling / Coordination of Care: 30 minutes  Greater than 50% of this total time spent on direct patient counseling and coordination of care  Chief Complaint:   Atrial Fibrillation (patient went to her PCP for a rash she developed while taking Keflex  Found to be in afib 150-160s, with no history of afib  Paramedics started a line, gave diltiazem 15mg, and a fluid bolus in the field  Patient arrives alert, oriented, speaking in full sentences in no accute distress  )      History of Present Illness:    Yareli Dumont is a 80 y o  female with a PMH of colon cancer, hypertension, hyperlipidemia who presents with cough, shortness of breath, arrhythmia  Patient states that she initially felt unwell on Monday and went to see her primary care doctor for productive cough, sneezing, congestion  She was started on Keflex which she took 2 days of but then stopped due to a rash  She called her primary care doctor on Wednesday and the antibiotic was switched though she is unsure which she then took  Today she felt increasingly short of breath and did not really feel any better so she went to see her primary care doctor again  When she arrived she was found to be in atrial fibrillation  Medics were called and she was brought to the emergency department    Her heart rate was controlled by EMS with 15 mg of IV push Cardizem  The time of my evaluation in the emergency department she states that she is feeling more short of breath, suspect this is due to the L bolus she received as her BNP is elevated  She has never had a cardiac workup or an echocardiogram   In the ER, patient was given cefepime and azithromycin and once again broke out in a rash  She is admitted for further management  Review of Systems:    Review of Systems   Constitutional: Negative  HENT: Positive for congestion  Eyes: Negative  Respiratory: Positive for cough and shortness of breath  Cardiovascular: Positive for palpitations  Negative for chest pain  Gastrointestinal: Negative  Endocrine: Negative  Genitourinary: Negative  Musculoskeletal: Negative  Skin: Positive for rash  Allergic/Immunologic: Negative  Neurological: Negative  Hematological: Negative  Psychiatric/Behavioral: Negative  Past Medical and Surgical History:     Past Medical History:   Diagnosis Date    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[facility] [address] )     Community acquired pneumonia     last assessed: 10/11/16    Hyperlipidemia     Hypertension        Past Surgical History:   Procedure Laterality Date    APPENDECTOMY      BOWEL RESECTION      CATARACT EXTRACTION EXTRACAPSULAR W/ INTRAOCULAR LENS IMPLANTATION Bilateral     COLECTOMY      Partial    COLON SURGERY      colon cancer    COLONOSCOPY N/A 8/5/2016    Procedure: COLONOSCOPY;  Surgeon: Alexis Valentino MD;  Location: [place] [address] GI LAB; Service:     HYSTERECTOMY      THROAT SURGERY      TRACHEAL SURGERY      tumor removed benign    TUMOR REMOVAL N/A 2000    trachea       Meds/Allergies:    Prior to Admission medications    Medication Sig Start Date End Date Taking?  Authorizing Provider   alendronate (FOSAMAX) 70 mg tablet TAKE ONE TABLET BY MOUTH ONCE EVERY WEEK 6/26/18  Yes Allie Patton MD   ezetimibe-simvastatin (VYTORIN) 10-20 mg per tablet TAKE ONE TABLET BY MOUTH EVERY DAY AT BEDTIME 11/28/18  Yes Rosas Peterson MD   hydrochlorothiazide (HYDRODIURIL) 25 mg tablet Take 1 tablet (25 mg total) by mouth daily 4/13/18  Yes Edie Rossi MD   lisinopril (ZESTRIL) 2 5 mg tablet Take 1 tablet (2 5 mg total) by mouth daily 4/13/18  Yes Edie Rossi MD   Calcium Carbonate-Vitamin D (CALCIUM 600+D) 600-200 MG-UNIT TABS Take by mouth daily 10/31/08 1/18/19 Yes Historical Provider, MD   azithromycin (ZITHROMAX) 250 mg tablet Take 2 tablets today then 1 tablet daily x 4 days  Patient not taking: Reported on 1/18/2019 1/17/19 1/18/19  Rosas Peterson MD   cephalexin [facility]) 500 mg capsule Take 1 capsule (500 mg total) by mouth every 8 (eight) hours for 7 days  Patient not taking: Reported on 1/18/2019 1/14/19 1/18/19  Edie Rossi MD   promethazine-dextromethorphan [facility]) 6 25-15 mg/5 mL oral syrup Take 5 mL by mouth daily  Patient not taking: Reported on 1/18/2019 1/14/19 1/18/19  Edie Rossi MD       Allergies: Allergies   Allergen Reactions    Azithromycin     Niacin And Related     Keflex [Cephalexin] Rash       Social History:     Marital Status:    Substance Use History:   History   Alcohol Use No     History   Smoking Status    Never Smoker   Smokeless Tobacco    Never Used     History   Drug use: Unknown       Family History:    Family History   Problem Relation Age of Onset    Diabetes Sister     Hypertension Sister        Physical Exam:     Vitals:   Blood Pressure: 118/56 (01/18/19 2021)  Pulse: 83 (01/18/19 2021)  Temperature: (!) 96 2 °F (35 7 °C) (01/18/19 2021)  Temp Source: Tympanic (01/18/19 2021)  Respirations: 20 (01/18/19 2021)  Height: 5' 1" (154 9 cm) (01/18/19 2021)  Weight - Scale: 60 kg (132 lb 4 4 oz) (01/18/19 2021)  SpO2: 93 % (01/18/19 2021)    Physical Exam   Constitutional: She is oriented to person, place, and time   She appears well-developed and well-nourished  She appears distressed (Mild)  HENT:   Head: Normocephalic and atraumatic  Eyes: Pupils are equal, round, and reactive to light  Neck: Normal range of motion  Cardiovascular: An irregular rhythm present  Murmur heard  Pulmonary/Chest: She has decreased breath sounds in the right lower field and the left lower field  She has wheezes (Diffuse inspiratory wheezing)  Abdominal: Soft  Bowel sounds are normal  She exhibits no distension  There is no tenderness  Musculoskeletal: Normal range of motion  She exhibits no edema  Neurological: She is alert and oriented to person, place, and time  Skin: Skin is warm and dry  Diffuse urticaria to trunk   Psychiatric: She has a normal mood and affect  Additional Data:     Lab Results: I have personally reviewed pertinent reports  Results from last 7 days  Lab Units 01/18/19  1520   WBC Thousand/uL 21 32*   HEMOGLOBIN g/dL 14 6   HEMATOCRIT % 43 9   PLATELETS Thousands/uL 469*       Results from last 7 days  Lab Units 01/18/19  1520   SODIUM mmol/L 133*   POTASSIUM mmol/L 3 5   CHLORIDE mmol/L 97*   CO2 mmol/L 28   BUN mg/dL 16   CREATININE mg/dL 1 04   CALCIUM mg/dL 8 1*   TOTAL BILIRUBIN mg/dL 0 90   ALK PHOS U/L 115   ALT U/L 41   AST U/L 50*           Results from last 7 days  Lab Units 01/18/19  1520   TROPONIN I ng/mL <0 02           Results from last 7 days  Lab Units 01/18/19  1614   LACTIC ACID mmol/L 1 7       Imaging: I have personally reviewed pertinent reports  XR chest 2 views   Final Result by Jairo Ward MD (01/18 5857)      Ill-defined 1 8 cm right apical nodule  Recommend follow-up CT scan  Small right pleural effusion  Workstation performed: [id number]         CT chest wo contrast    (Results Pending)       XR chest 2 views   Final Result      Ill-defined 1 8 cm right apical nodule  Recommend follow-up CT scan  Small right pleural effusion              Workstation performed: [id number]         CT chest wo contrast    (Results Pending)       EKG, Pathology, and Other Studies Reviewed on Admission:   · EKG: atrial fibrillation    Allscripts / Epic Records Reviewed: Yes     ** Please Note: This note has been constructed using a voice recognition system   **

## 2019-01-19 NOTE — ASSESSMENT & PLAN NOTE
BP elevated at 1900, increasing shortness of breath with exertion, received 1 L NS bolus by medics - suspect some component of volume overload  · Started Lasix 40 mg IV push daily  · Echocardiogram pending  · Cardiology consulted, recs appreciated  · Daily weights  · Intake and output

## 2019-01-19 NOTE — ASSESSMENT & PLAN NOTE
Patient found to be in rapid atrial fibrillation in her PCPs office, in the ER rate is in the 80s after paramedics gave 15 mg of IV Cardizem  · Will start low-dose beta-blocker for rate control  · Lovenox 1 milligram/kilogram q 12 hours  · Echocardiogram  · Cardiology consultation  · Add on TSH and Mag

## 2019-01-19 NOTE — CONSULTS
Consultation - Pulmonary Medicine   Marily Valdovinos 80 y o  female MRN: [id number]  Unit/Bed#: [address] [place] [address] Encounter: [id number]      Assessment:  Bilateral multifocal pneumonia  Community-acquired pneumonia  Right pericardiac lymphadenopathy  Small right-sided pleural effusion      Plan:   Currently patient is saturating well on 2 L of oxygen by nasal cannula   Reviewed CT of the chest results with the patient with bilateral multifocal pneumonia patchy, right greater than left,  Currently on aztreonam and doxycycline, broke out with rash with cefepime and azithromycin last night in the ER  Right pericardiac lymphadenopathy likely reactive  Continue with antibiotics for a total duration of 1 week  Outpatient Pulmonary follow-up with repeat imaging in 6-8 weeks for resolution of the pneumonia  Leukocytosis is currently trending down  Continue with airway clearance measures with Mucinex, incentive spirometry and flutter valve  Small right-sided pleural effusion,? With acute congestive heart failure with increased BNP  Her also patient on prednisone 40 mg daily for the rash along with Benadryl  Thank you for the consultation will continue to follow  History of Present Illness   Physician Requesting Consult: Leroy Farnsworth MD  Reason for Consult / Principal Problem:   Hx and PE limited by: none  HPI: Marily Valdovinos is a 80y o  year old female with no smoking history, past medical history significant for hypertension hyperlipidemia and history of colon adenocarcinoma status post surgery, states at baseline is able to walk around in the house do her daily chores, does not have any shortness of breath or wheezing, is able to climb up a flight of stairs on a regular basis,  No history of any recent travel, history of sick contacts is present    She states for the past 4-5 days has not been feeling well, had headaches comma cough shortness of breath runny nose as well as extremely tired and fatigued, called her PCP was given key flex she states she took it for 2 days broke into rash, and she was switched to another antibiotic does not remember the name, still was not feeling well, with worsening cough bringing up yellow green phlegm as well as a shortness of breath for which she went back to her PCPs office yesterday was found to be in AFib RVR, sent into the ER  Paramedics given 15 mg of IV Cardizem  She states she does feel better today,    Consults    Review of Systems   Constitutional: Positive for fatigue  Negative for appetite change, chills, diaphoresis, fever and unexpected weight change  HENT: Positive for congestion  Negative for ear discharge, ear pain, nosebleeds, postnasal drip, rhinorrhea, sinus pain, sore throat and voice change  Eyes: Negative for pain, discharge and visual disturbance  Respiratory: Positive for cough and shortness of breath  Negative for apnea, choking, chest tightness, wheezing and stridor  Cardiovascular: Negative for chest pain, palpitations and leg swelling  Gastrointestinal: Negative for abdominal pain, blood in stool, constipation, diarrhea and vomiting  Endocrine: Negative for cold intolerance, heat intolerance, polydipsia, polyphagia and polyuria  Genitourinary: Negative for difficulty urinating and dysuria  Musculoskeletal: Negative for arthralgias and neck pain  Skin: Negative for pallor and rash  Allergic/Immunologic: Negative for environmental allergies and food allergies  Neurological: Positive for headaches  Negative for dizziness, speech difficulty, weakness and light-headedness  Hematological: Negative for adenopathy  Does not bruise/bleed easily  Psychiatric/Behavioral: Negative for agitation, confusion and sleep disturbance  The patient is not nervous/anxious          Historical Information   Past Medical History:   Diagnosis Date    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[facility] [address] )     Community acquired pneumonia     last assessed: 10/11/16    Hyperlipidemia     Hypertension      Past Surgical History:   Procedure Laterality Date    APPENDECTOMY      BOWEL RESECTION      CATARACT EXTRACTION EXTRACAPSULAR W/ INTRAOCULAR LENS IMPLANTATION Bilateral     COLECTOMY      Partial    COLON SURGERY      colon cancer    COLONOSCOPY N/A 8/5/2016    Procedure: COLONOSCOPY;  Surgeon: Yenny Siegel MD;  Location: [place] [address] GI LAB; Service:     EYE SURGERY      HYSTERECTOMY      THROAT SURGERY      TRACHEAL SURGERY      tumor removed benign    TUMOR REMOVAL N/A 2000    trachea     Social History   History   Alcohol Use No     History   Drug use: Unknown     History   Smoking Status    Never Smoker   Smokeless Tobacco    Never Used         Family History: non-contributory    Meds/Allergies   current meds:   Current Facility-Administered Medications   Medication Dose Route Frequency    acetaminophen (TYLENOL) tablet 650 mg  650 mg Oral Q6H PRN    aztreonam (AZACTAM) 2,000 mg in sodium chloride 0 9 % 100 mL IVPB  2,000 mg Intravenous Q8H    diphenhydrAMINE (BENADRYL) tablet 25 mg  25 mg Oral Q6H PRN    doxycycline hyclate (VIBRAMYCIN) capsule 100 mg  100 mg Oral Q12H [facility]    enoxaparin (LOVENOX) subcutaneous injection 60 mg  1 mg/kg Subcutaneous Q12H [facility]    ezetimibe 10 mg-pravastatin 40 mg combo dose   Oral HS    furosemide (LASIX) injection 40 mg  40 mg Intravenous Daily    metoprolol tartrate (LOPRESSOR) tablet 25 mg  25 mg Oral BID    ondansetron (ZOFRAN) injection 4 mg  4 mg Intravenous Q8H PRN       Allergies   Allergen Reactions    Azithromycin     Niacin And Related     Keflex [Cephalexin] Rash       Objective   Vitals: Blood pressure 103/58, pulse 68, temperature 97 7 °F (36 5 °C), temperature source Oral, resp  rate 18, height 5' 1" (1 549 m), weight 60 kg (132 lb 4 4 oz), SpO2 96 %  ,Body mass index is 24 99 kg/m²      Intake/Output Summary (Last 24 hours) at 01/19/19 1138  Last data filed at 01/19/19 1107   Gross per 24 hour   Intake              400 ml Output             1600 ml   Net            -1200 ml     Invasive Devices     Peripheral Intravenous Line            Peripheral IV 01/18/19 Right Forearm 1 day                Physical Exam   Constitutional: She is oriented to person, place, and time  She appears well-developed and well-nourished  HENT:   Head: Normocephalic and atraumatic  Eyes: Pupils are equal, round, and reactive to light  Conjunctivae are normal    Neck: Normal range of motion  Neck supple  No JVD present  No thyromegaly present  Cardiovascular: Normal rate, regular rhythm and normal heart sounds  Exam reveals no gallop and no friction rub  No murmur heard  Pulmonary/Chest: Effort normal  No respiratory distress  She has no wheezes  She has rales (Basal rales right base posteriorly  )  She exhibits no tenderness  Abdominal: Soft  Bowel sounds are normal    Musculoskeletal: Normal range of motion  She exhibits no edema, tenderness or deformity  Lymphadenopathy:     She has no cervical adenopathy  Neurological: She is alert and oriented to person, place, and time  Skin: Skin is warm and dry  Rash noted  Psychiatric: She has a normal mood and affect  Nursing note and vitals reviewed  Lab Results:   CBC:   Lab Results   Component Value Date    WBC 16 19 (H) 01/19/2019    HGB 14 2 01/19/2019    HCT 43 5 01/19/2019    MCV 96 01/19/2019     (H) 01/19/2019    MCH 31 4 01/19/2019    MCHC 32 6 01/19/2019    RDW 13 2 01/19/2019    MPV 9 5 01/19/2019    NRBC 0 01/19/2019     Imaging Studies: I have personally reviewed pertinent films in PACS  EKG, Pathology, and Other Studies: I have personally reviewed pertinent reports      VTE Prophylaxis: Sequential compression device Syl Hdz     Code Status: Level 3 - DNAR and DNI  Advance Directive and Living Will:      Power of :    POLST:

## 2019-01-19 NOTE — ASSESSMENT & PLAN NOTE
As evidenced by a white count of 36995 with bandemia, tachycardia  Patient presented to the ED with complaints of not feeling well since Monday - cough, sneeze, productive cough associated with some shortness of breath  She saw her primary care doctor and took 2 days worth of Keflex then broke out in a diffuse pruritic rash  She called her primary care doctor in the antibiotic was changed but she is unsure what it was changed to  She returned to her primary care doctor's office today because she was feeling worse and was found to be in rapid AFib  Chest x-ray revealed a right apical nodule which radiology thought could potentially be PNA - no other source    · Admit to medicine  · Pt had an allergic reaction to azithromycin vs cefepime in the ER - at this point will place patient on doxycyline and azactam  · Send urine for strep and legionella  · Blood cultures pending  · Send procalcitonin and trend daily  · Supportive therapies with sunny respiratory protocol  · CT chest

## 2019-01-19 NOTE — PROGRESS NOTES
Progress Note - Gopi Burns 1933, 80 y o  female MRN: [id number]    Unit/Bed#: [address] [address] Encounter: [id number]    Primary Care Provider: Lia Martinez MD   Date and time admitted to hospital: 1/18/2019  2:59 PM        * Sepsis due to pneumonia [facility])   Assessment & Plan    A/e/b leukocytosis with bandemia, tachycardia  pneumonia, failed outpatient therapy  Chest x-ray revealed a right apical nodule  CT chest shows multifocal pneumonia  · Pt had an allergic reaction to azithromycin vs cefepime in the ER - continue doxycyline and azactam  · urine for strep and legionella pending  · Blood cultures pending  · Serial procalcitonins ordered  · Pulmonary consulted, recs appreciated     Atrial fibrillation with rapid ventricular response [facility])   Assessment & Plan    Patient found to be in rapid atrial fibrillation in her PCPs office, in the ER rate is in the 80s after paramedics gave 15 mg of IV Cardizem  TSH wnl  · started low-dose beta-blocker for rate control  · Lovenox 1 milligram/kilogram q 12 hours  · Echocardiogram pending  · Cardiology consulted, recs appreciated         Rash   Assessment & Plan    · Appears to be allergy from antibiotic  · Continue p o   Prednisone with taper     Electrolyte abnormality   Assessment & Plan    Potassium repleted today  Monitor and replete K and Mad daily as needed     Acute congestive heart failure (HCC)   Assessment & Plan    BP elevated at 1900, increasing shortness of breath with exertion, received 1 L NS bolus by medics - suspect some component of volume overload  · Started Lasix 40 mg IV push daily  · Echocardiogram pending  · Cardiology consulted, recs appreciated  · Daily weights  · Intake and output     Other hyperlipidemia   Assessment & Plan    Continue statin     Benign essential hypertension   Assessment & Plan    Hold lisinopril and hydrochlorothiazide for now, added metoprolol           VTE Pharmacologic Prophylaxis:   Pharmacologic: Enoxaparin (Lovenox)  Mechanical VTE Prophylaxis in Place: Yes    Patient Centered Rounds: I have performed bedside rounds with nursing staff today  Discussions with Specialists or Other Care Team Provider: Yes  Education and Discussions with Family / Patient:Yes  Time Spent for Care: 45 minutes  More than 50% of total time spent on counseling and coordination of care as described above  Current Length of Stay: 1 day(s)  Current Patient Status: Inpatient     Discharge Plan: pending    Code Status: Level 3 - DNAR and DNI      Subjective:   Feels better since arrival, SOB improving  Still has cough  Has hx of palpitations on and off for a long time  Objective:     Vitals:   Temp (24hrs), Av 6 °F (36 4 °C), Min:96 2 °F (35 7 °C), Max:98 6 °F (37 °C)    Temp:  [96 2 °F (35 7 °C)-98 6 °F (37 °C)] 97 7 °F (36 5 °C)  HR:  [] 68  Resp:  [18-25] 18  BP: ()/(54-74) 103/58  SpO2:  [93 %-96 %] 96 %  Body mass index is 24 99 kg/m²  Input and Output Summary (last 24 hours): Intake/Output Summary (Last 24 hours) at 19 1226  Last data filed at 19 1107   Gross per 24 hour   Intake              400 ml   Output             1600 ml   Net            -1200 ml        Physical Exam:     Physical Exam   Constitutional: She is oriented to person, place, and time  She appears well-developed  No distress  HENT:   Head: Normocephalic and atraumatic  Cardiovascular: Normal rate and regular rhythm  Murmur heard  Pulmonary/Chest: Effort normal and breath sounds normal  No respiratory distress  She has no wheezes  She has no rales  Bibasilar rales   Abdominal: Soft  Bowel sounds are normal  She exhibits no distension  There is no tenderness  There is no rebound and no guarding  Musculoskeletal: She exhibits no edema, tenderness or deformity  Neurological: She is alert and oriented to person, place, and time  Skin: Skin is warm and dry  Rash noted  Psychiatric: She has a normal mood and affect  Her behavior is normal    Nursing note and vitals reviewed  Additional Data:     Labs:      Results from last 7 days  Lab Units 01/19/19  0641 01/18/19  1520   WBC Thousand/uL 16 19* 21 32*   HEMOGLOBIN g/dL 14 2 14 6   HEMATOCRIT % 43 5 43 9   PLATELETS Thousands/uL 482* 469*   NEUTROS PCT % 89*  --        Results from last 7 days  Lab Units 01/19/19  0641 01/18/19  1520   SODIUM mmol/L 137 133*   POTASSIUM mmol/L 3 3* 3 5   CHLORIDE mmol/L 99* 97*   CO2 mmol/L 30 28   BUN mg/dL 17 16   CREATININE mg/dL 0 76 1 04   CALCIUM mg/dL 8 3 8 1*   TOTAL BILIRUBIN mg/dL  --  0 90   ALK PHOS U/L  --  115   ALT U/L  --  41   AST U/L  --  50*           Results from last 7 days  Lab Units 01/19/19  0122 01/18/19  2258 01/18/19  1520   TROPONIN I ng/mL <0 02 <0 02 <0 02     No results found for: HGBA1C        Results from last 7 days  Lab Units 01/18/19  1614   LACTIC ACID mmol/L 1 7       * I Have Reviewed All Lab Data Listed Above  * Additional Pertinent Lab Tests Reviewed: All Labs Within Last 24 Hours Reviewed    Imaging:     CT chest wo contrast   Final Result by Wendie Alford MD (01/19 1576)      1  Scattered patchy consolidation infiltrates bilaterally compatible with multifocal pneumonia  Recommend follow-up to resolution  Follow-up chest CT recommended in 6-8 weeks  2  Enlarged right pericardiac lymph node, question reactive  This can be reassessed on follow-up  3   New small right pleural effusion  4   Partially visualized large cyst in the left upper quadrant likely of renal origin  This could be followed up with renal ultrasound  The study was marked in EPIC for significant notification  Workstation performed: [id number]         XR chest 2 views   Final Result by Sarah Mueller MD (01/18 1609)      Ill-defined 1 8 cm right apical nodule  Recommend follow-up CT scan  Small right pleural effusion              Workstation performed: [id number]           Imaging Reports Reviewed by myself    Cultures:   Blood Culture:   Lab Results   Component Value Date    BLOODCX No Growth After 5 Days  08/01/2016    BLOODCX No Growth After 5 Days  08/01/2016     Urine Culture:   Lab Results   Component Value Date    URINECX >100,000 cfu/ml Klebsiella pneumoniae (A) 10/17/2018    URINECX No Growth <1000 cfu/mL 08/01/2016     Sputum Culture: No components found for: SPUTUMCX  Wound Culture: No results found for: WOUNDCULT    Last 24 Hours Medication List:     Current Facility-Administered Medications:  acetaminophen 650 mg Oral Q6H PRN Brendalyn Ray, CRNP    aztreonam 2,000 mg Intravenous Q8H Brendalyn Ray, CRNP Last Rate: 2,000 mg (01/19/19 0941)   diphenhydrAMINE 25 mg Oral Q6H PRN Brendalyn Ary, CRNP    doxycycline hyclate 100 mg Oral Q12H [address] Brendalyn Ray, CRNP    enoxaparin 1 mg/kg Subcutaneous Q12H [address] Brendalyn Ray, CRNP    ezetimibe 10 mg-pravastatin 40 mg combo dose  Oral HS Brendalyn Ray, CRNP    furosemide 40 mg Intravenous Daily Brendalyn Ray, CRNP    metoprolol tartrate 25 mg Oral BID Brendalyn Ray, CRNP    ondansetron 4 mg Intravenous Q8H PRN Brendalyn Ray, CRNP         Today, Patient Was Seen By: Zack Suazo MD    ** Please Note: Dragon 360 Dictation voice to text software may have been used in the creation of this document   **

## 2019-01-19 NOTE — ASSESSMENT & PLAN NOTE
Patient found to be in rapid atrial fibrillation in her PCPs office, in the ER rate is in the 80s after paramedics gave 15 mg of IV Cardizem  TSH wnl  · started low-dose beta-blocker for rate control  · Lovenox 1 milligram/kilogram q 12 hours  · Echocardiogram pending  · Cardiology consulted, recs appreciated

## 2019-01-20 PROBLEM — I50.31 ACUTE DIASTOLIC CONGESTIVE HEART FAILURE (HCC): Status: ACTIVE | Noted: 2019-01-18

## 2019-01-20 LAB
ANION GAP SERPL CALCULATED.3IONS-SCNC: 4 MMOL/L (ref 4–13)
BUN SERPL-MCNC: 16 MG/DL (ref 5–25)
CALCIUM SERPL-MCNC: 8.3 MG/DL (ref 8.3–10.1)
CHLORIDE SERPL-SCNC: 101 MMOL/L (ref 100–108)
CO2 SERPL-SCNC: 31 MMOL/L (ref 21–32)
CREAT SERPL-MCNC: 0.63 MG/DL (ref 0.6–1.3)
ERYTHROCYTE [DISTWIDTH] IN BLOOD BY AUTOMATED COUNT: 13.5 % (ref 11.6–15.1)
GFR SERPL CREATININE-BSD FRML MDRD: 82 ML/MIN/1.73SQ M
GLUCOSE SERPL-MCNC: 100 MG/DL (ref 65–140)
HCT VFR BLD AUTO: 40.9 % (ref 34.8–46.1)
HGB BLD-MCNC: 13 G/DL (ref 11.5–15.4)
MAGNESIUM SERPL-MCNC: 2.1 MG/DL (ref 1.6–2.6)
MCH RBC QN AUTO: 30.9 PG (ref 26.8–34.3)
MCHC RBC AUTO-ENTMCNC: 31.8 G/DL (ref 31.4–37.4)
MCV RBC AUTO: 97 FL (ref 82–98)
MRSA NOSE QL CULT: NORMAL
PLATELET # BLD AUTO: 447 THOUSANDS/UL (ref 149–390)
PMV BLD AUTO: 9.4 FL (ref 8.9–12.7)
POTASSIUM SERPL-SCNC: 4 MMOL/L (ref 3.5–5.3)
PROCALCITONIN SERPL-MCNC: 0.05 NG/ML
PROCALCITONIN SERPL-MCNC: 0.06 NG/ML
RBC # BLD AUTO: 4.21 MILLION/UL (ref 3.81–5.12)
SODIUM SERPL-SCNC: 136 MMOL/L (ref 136–145)
WBC # BLD AUTO: 9.97 THOUSAND/UL (ref 4.31–10.16)

## 2019-01-20 PROCEDURE — 80048 BASIC METABOLIC PNL TOTAL CA: CPT | Performed by: STUDENT IN AN ORGANIZED HEALTH CARE EDUCATION/TRAINING PROGRAM

## 2019-01-20 PROCEDURE — 99232 SBSQ HOSP IP/OBS MODERATE 35: CPT | Performed by: STUDENT IN AN ORGANIZED HEALTH CARE EDUCATION/TRAINING PROGRAM

## 2019-01-20 PROCEDURE — 99232 SBSQ HOSP IP/OBS MODERATE 35: CPT | Performed by: INTERNAL MEDICINE

## 2019-01-20 PROCEDURE — 84145 PROCALCITONIN (PCT): CPT | Performed by: STUDENT IN AN ORGANIZED HEALTH CARE EDUCATION/TRAINING PROGRAM

## 2019-01-20 PROCEDURE — 85027 COMPLETE CBC AUTOMATED: CPT | Performed by: STUDENT IN AN ORGANIZED HEALTH CARE EDUCATION/TRAINING PROGRAM

## 2019-01-20 PROCEDURE — 83735 ASSAY OF MAGNESIUM: CPT | Performed by: STUDENT IN AN ORGANIZED HEALTH CARE EDUCATION/TRAINING PROGRAM

## 2019-01-20 RX ORDER — ACETAMINOPHEN 325 MG/1
650 TABLET ORAL EVERY 6 HOURS PRN
Status: DISCONTINUED | OUTPATIENT
Start: 2019-01-20 | End: 2019-01-21 | Stop reason: HOSPADM

## 2019-01-20 RX ADMIN — DOXYCYCLINE 100 MG: 100 CAPSULE ORAL at 12:01

## 2019-01-20 RX ADMIN — AZTREONAM 2000 MG: 2 INJECTION, POWDER, LYOPHILIZED, FOR SOLUTION INTRAMUSCULAR; INTRAVENOUS at 22:40

## 2019-01-20 RX ADMIN — METOPROLOL TARTRATE 25 MG: 25 TABLET, FILM COATED ORAL at 12:01

## 2019-01-20 RX ADMIN — ACETAMINOPHEN 650 MG: 325 TABLET, FILM COATED ORAL at 11:11

## 2019-01-20 RX ADMIN — RIVAROXABAN 20 MG: 10 TABLET, FILM COATED ORAL at 16:15

## 2019-01-20 RX ADMIN — PRAVASTATIN SODIUM: 40 TABLET ORAL at 21:16

## 2019-01-20 RX ADMIN — AZTREONAM 2000 MG: 2 INJECTION, POWDER, LYOPHILIZED, FOR SOLUTION INTRAMUSCULAR; INTRAVENOUS at 15:26

## 2019-01-20 RX ADMIN — FUROSEMIDE 40 MG: 10 INJECTION, SOLUTION INTRAMUSCULAR; INTRAVENOUS at 09:37

## 2019-01-20 RX ADMIN — DOXYCYCLINE 100 MG: 100 CAPSULE ORAL at 21:16

## 2019-01-20 RX ADMIN — AZTREONAM 2000 MG: 2 INJECTION, POWDER, LYOPHILIZED, FOR SOLUTION INTRAMUSCULAR; INTRAVENOUS at 08:10

## 2019-01-20 RX ADMIN — ENOXAPARIN SODIUM 60 MG: 60 INJECTION SUBCUTANEOUS at 09:37

## 2019-01-20 RX ADMIN — METOPROLOL TARTRATE 25 MG: 25 TABLET, FILM COATED ORAL at 21:15

## 2019-01-20 NOTE — RESPIRATORY THERAPY NOTE
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[address] )     Community acquired pneumonia     last assessed: 10/11/16    Hyperlipidemia     Hypertension      Social History     Social History    Marital status:      Spouse name: N/A    Number of children: N/A    Years of education: N/A     Social History Main Topics    Smoking status: Never Smoker    Smokeless tobacco: Never Used    Alcohol use No    Drug use: Unknown    Sexual activity: Not Asked      Comment: NOT ASKED     Other Topics Concern    None     Social History Narrative    Daily tea consumption ( 1 cups/day)           Subjective         Objective    Physical Exam:        Vitals:  Blood pressure 121/58, pulse 67, temperature (!) 96 3 °F (35 7 °C), temperature source Tympanic, resp  rate 18, height 5' 1" (1 549 m), weight 60 kg (132 lb 4 4 oz), SpO2 97 %                    Plan    Respiratory Plan: No distress/Pulmonary history, Discontinue Protocol

## 2019-01-20 NOTE — PROGRESS NOTES
Progress Note - Nilesh Burks 1933, 80 y o  female MRN: [id number]    Unit/Bed#: [address] [address] Encounter: [id number]    Primary Care Provider: Adi Cook MD   Date and time admitted to hospital: 1/18/2019  2:59 PM        * Sepsis due to pneumonia [facility])   Assessment & Plan    A/e/b leukocytosis with bandemia, tachycardia  pneumonia, failed outpatient therapy  Chest x-ray revealed a right apical nodule  CT chest shows multifocal pneumonia  · Pt had an allergic reaction to azithromycin vs cefepime in the ER - continue doxycyline and azactam  · urine for strep and legionella negative  · Blood cultures pending  · Serial procalcitonins: 0 06  · Pulmonary consulted, recs appreciated     Atrial fibrillation with rapid ventricular response [facility])   Assessment & Plan    Patient found to be in rapid atrial fibrillation in her PCPs office, in the ER rate is in the 80s after paramedics gave 15 mg of IV Cardizem  TSH wnl  · started low-dose beta-blocker for rate control  · Started weight based lovenox, bridge over to xarelto today  · Echo noted  · Cardiology consulted, recs appreciated         Rash   Assessment & Plan    · Appears to be allergy from antibiotic, improving  · Continue p o   Prednisone with taper     Electrolyte abnormality   Assessment & Plan    Monitor and replete K and Mad daily as needed     Acute diastolic congestive heart failure (HCC)   Assessment & Plan    BP elevated at 1900, increasing shortness of breath with exertion, received 1 L NS bolus by medics - suspect some component of volume overload  · Echocardiogram with elevated LA filling pressure  · Cardiology consulted, recs appreciated  · Last IV lasix dose today     Benign essential hypertension   Assessment & Plan    Hold lisinopril and hydrochlorothiazide for now, added metoprolol           VTE Pharmacologic Prophylaxis:   Pharmacologic: Enoxaparin (Lovenox)  Mechanical VTE Prophylaxis in Place: Yes    Patient Centered Rounds: I have performed bedside rounds with nursing staff today  Discussions with Specialists or Other Care Team Provider: Yes  Education and Discussions with Family / Patient:Yes  Time Spent for Care: 45 minutes  More than 50% of total time spent on counseling and coordination of care as described above  Current Length of Stay: 2 day(s)  Current Patient Status: Inpatient     Discharge Plan: pending    Code Status: Level 3 - DNAR and DNI      Subjective:   Feels better since arrival, no SOB  Still has cough  Denies palpitations  Objective:     Vitals:   Temp (24hrs), Av 3 °F (36 3 °C), Min:96 3 °F (35 7 °C), Max:97 7 °F (36 5 °C)    Temp:  [96 3 °F (35 7 °C)-97 7 °F (36 5 °C)] 97 7 °F (36 5 °C)  HR:  [67-85] 78  Resp:  [18] 18  BP: (102-149)/(51-64) 102/51  SpO2:  [94 %-97 %] 96 %  Body mass index is 26 74 kg/m²  Input and Output Summary (last 24 hours): Intake/Output Summary (Last 24 hours) at 19 1428  Last data filed at 19 1002   Gross per 24 hour   Intake                0 ml   Output              800 ml   Net             -800 ml        Physical Exam:     Physical Exam   Constitutional: She is oriented to person, place, and time  She appears well-developed  No distress  HENT:   Head: Normocephalic and atraumatic  Cardiovascular: Normal rate and regular rhythm  Murmur heard  Pulmonary/Chest: Effort normal and breath sounds normal  No respiratory distress  She has no wheezes  She has no rales  Abdominal: Soft  Bowel sounds are normal  She exhibits no distension  There is no tenderness  There is no rebound and no guarding  Musculoskeletal: She exhibits no edema, tenderness or deformity  Neurological: She is alert and oriented to person, place, and time  Skin: Skin is warm and dry  No rash noted  Psychiatric: She has a normal mood and affect  Her behavior is normal    Nursing note and vitals reviewed        Additional Data:     Labs:      Results from last 7 days  Lab Units 01/20/19  0540 01/19/19  0641 01/18/19  1520   WBC Thousand/uL 9 97 16 19* 21 32*   HEMOGLOBIN g/dL 13 0 14 2 14 6   HEMATOCRIT % 40 9 43 5 43 9   PLATELETS Thousands/uL 447* 482* 469*   NEUTROS PCT %  --  89*  --        Results from last 7 days  Lab Units 01/20/19  0540 01/19/19  0641 01/18/19  1520   SODIUM mmol/L 136 137 133*   POTASSIUM mmol/L 4 0 3 3* 3 5   CHLORIDE mmol/L 101 99* 97*   CO2 mmol/L 31 30 28   BUN mg/dL 16 17 16   CREATININE mg/dL 0 63 0 76 1 04   CALCIUM mg/dL 8 3 8 3 8 1*   TOTAL BILIRUBIN mg/dL  --   --  0 90   ALK PHOS U/L  --   --  115   ALT U/L  --   --  41   AST U/L  --   --  50*           Results from last 7 days  Lab Units 01/19/19  0122 01/18/19  2258 01/18/19  1520   TROPONIN I ng/mL <0 02 <0 02 <0 02     No results found for: HGBA1C        Results from last 7 days  Lab Units 01/19/19  1456 01/18/19  1614   LACTIC ACID mmol/L  --  1 7   PROCALCITONIN ng/ml 0 06  --        * I Have Reviewed All Lab Data Listed Above  * Additional Pertinent Lab Tests Reviewed: All Labs Within Last 24 Hours Reviewed    Imaging:     CT chest wo contrast   Final Result by Fransisco Boogie MD (01/19 1512)      1  Scattered patchy consolidation infiltrates bilaterally compatible with multifocal pneumonia  Recommend follow-up to resolution  Follow-up chest CT recommended in 6-8 weeks  2  Enlarged right pericardiac lymph node, question reactive  This can be reassessed on follow-up  3   New small right pleural effusion  4   Partially visualized large cyst in the left upper quadrant likely of renal origin  This could be followed up with renal ultrasound  The study was marked in EPIC for significant notification  Workstation performed: [id number]         XR chest 2 views   Final Result by Mindy Rao MD (01/18 5329)      Ill-defined 1 8 cm right apical nodule  Recommend follow-up CT scan  Small right pleural effusion              Workstation performed: [id number]           Imaging Reports Reviewed by myself    Cultures:   Blood Culture:   Lab Results   Component Value Date    BLOODCX No Growth at 24 hrs  01/18/2019    BLOODCX No Growth at 24 hrs  01/18/2019    BLOODCX No Growth After 5 Days  08/01/2016    BLOODCX No Growth After 5 Days  08/01/2016     Urine Culture:   Lab Results   Component Value Date    URINECX >100,000 cfu/ml Klebsiella pneumoniae (A) 10/17/2018    URINECX No Growth <1000 cfu/mL 08/01/2016     Sputum Culture: No components found for: SPUTUMCX  Wound Culture: No results found for: WOUNDCULT    Last 24 Hours Medication List:     Current Facility-Administered Medications:  acetaminophen 650 mg Oral Q6H PRN Roselia Hoffman MD    aztreonam 2,000 mg Intravenous Q8H NILO Jj Last Rate: 2,000 mg (01/20/19 0810)   diphenhydrAMINE 25 mg Oral Q6H PRN Mi Pineda, ARTURNP    doxycycline hyclate 100 mg Oral Q12H [facility] NILO Jj    ezetimibe 10 mg-pravastatin 40 mg combo dose  Oral HS Mi Octcarolina, CRNP    metoprolol tartrate 25 mg Oral BID Mi Pineda, CRNERISSA    ondansetron 4 mg Intravenous Q8H PRN Mi Pineda, CRNP    rivaroxaban 20 mg Oral Daily With Dinner Bouchra Fay DO         Today, Patient Was Seen By: Roselia Hoffman MD    ** Please Note: Dragon 360 Dictation voice to text software may have been used in the creation of this document   **

## 2019-01-20 NOTE — ASSESSMENT & PLAN NOTE
Patient found to be in rapid atrial fibrillation in her PCPs office, in the ER rate is in the 80s after paramedics gave patient 15 mg of IV Cardizem  TSH wnl  · Continue Lopressor, Xarelto  · She was initially on weight based lovenox

## 2019-01-20 NOTE — PROGRESS NOTES
Progress Note - Cardiology   Ave Amor 80 y o  female MRN: [id number]  Unit/Bed#: [address] [address] Encounter: [id number]  01/20/19  1:32 PM        Assessment  1  Atrial fibrillation - remains in sinus rhythm  - Discontinue lovenox and begin Xarelto tonight  - Reviewed echocardiogram - normal LV function noted  2  Pneumonia - on antibiotics  3  Acute diastolic congestive heart failure - elevated LA filling pressure on echocardiogram  - IV lasix * 1    4  Dyslipidemia  5  Hypertension - BP stable     Plan:  As above       Subjective: Ave Amor denies any chest pain or shortness of breath  No overnight events  Remains in sinus rhythm    Meds/Allergies   current meds:   Current Facility-Administered Medications   Medication Dose Route Frequency    acetaminophen (TYLENOL) tablet 650 mg  650 mg Oral Q6H PRN    aztreonam (AZACTAM) 2,000 mg in sodium chloride 0 9 % 100 mL IVPB  2,000 mg Intravenous Q8H    diphenhydrAMINE (BENADRYL) tablet 25 mg  25 mg Oral Q6H PRN    doxycycline hyclate (VIBRAMYCIN) capsule 100 mg  100 mg Oral Q12H [address]    enoxaparin (LOVENOX) subcutaneous injection 60 mg  1 mg/kg Subcutaneous Q12H [address]    ezetimibe 10 mg-pravastatin 40 mg combo dose   Oral HS    furosemide (LASIX) injection 40 mg  40 mg Intravenous Daily    metoprolol tartrate (LOPRESSOR) tablet 25 mg  25 mg Oral BID    ondansetron (ZOFRAN) injection 4 mg  4 mg Intravenous Q8H PRN     Allergies   Allergen Reactions    Azithromycin     Niacin And Related     Keflex [Cephalexin] Rash       Objective   Vitals: Blood pressure 102/51, pulse 78, temperature 97 7 °F (36 5 °C), temperature source Tympanic, resp  rate 18, height 5' 1" (1 549 m), weight 64 2 kg (141 lb 8 6 oz), SpO2 96 %  , Body mass index is 26 74 kg/m²          Intake/Output Summary (Last 24 hours) at 01/20/19 1332  Last data filed at 01/20/19 1002   Gross per 24 hour   Intake                0 ml   Output              800 ml   Net             -800 ml Physical Exam   Constitutional: She appears healthy  No distress  HENT:   Nose: Nose normal    Mouth/Throat: Oropharynx is clear  Eyes: Pupils are equal, round, and reactive to light  Conjunctivae are normal    Neck: Neck supple  No JVD present  Cardiovascular: Normal rate and regular rhythm  Exam reveals no distant heart sounds and no friction rub  Murmur heard  Pulmonary/Chest: Effort normal and breath sounds normal  She has no wheezes  She has no rales  Abdominal: Soft  She exhibits no distension  There is no tenderness  Musculoskeletal: She exhibits no edema  Neurological: She is alert and oriented to person, place, and time  Skin: Skin is warm and dry  No rash noted         Lab Results:     Troponins:   Results from last 7 days  Lab Units 01/19/19  0122 01/18/19  2258 01/18/19  1520   TROPONIN I ng/mL <0 02 <0 02 <0 02       Recent Results (from the past 24 hour(s))   Procalcitonin    Collection Time: 01/19/19  2:56 PM   Result Value Ref Range    Procalcitonin 0 06 <=0 25 ng/ml   CBC    Collection Time: 01/20/19  5:40 AM   Result Value Ref Range    WBC 9 97 4 31 - 10 16 Thousand/uL    RBC 4 21 3 81 - 5 12 Million/uL    Hemoglobin 13 0 11 5 - 15 4 g/dL    Hematocrit 40 9 34 8 - 46 1 %    MCV 97 82 - 98 fL    MCH 30 9 26 8 - 34 3 pg    MCHC 31 8 31 4 - 37 4 g/dL    RDW 13 5 11 6 - 15 1 %    Platelets 365 (H) 244 - 390 Thousands/uL    MPV 9 4 8 9 - 12 7 fL   Basic metabolic panel    Collection Time: 01/20/19  5:40 AM   Result Value Ref Range    Sodium 136 136 - 145 mmol/L    Potassium 4 0 3 5 - 5 3 mmol/L    Chloride 101 100 - 108 mmol/L    CO2 31 21 - 32 mmol/L    ANION GAP 4 4 - 13 mmol/L    BUN 16 5 - 25 mg/dL    Creatinine 0 63 0 60 - 1 30 mg/dL    Glucose 100 65 - 140 mg/dL    Calcium 8 3 8 3 - 10 1 mg/dL    eGFR 82 ml/min/1 73sq m   Magnesium    Collection Time: 01/20/19  5:40 AM   Result Value Ref Range    Magnesium 2 1 1 6 - 2 6 mg/dL          Cardiac testing:   Results for orders placed during the hospital encounter of 19   Echo complete with contrast if indicated    Narrative [address]  [address] [facility]  [address]  [phone]    Transthoracic Echocardiogram  2D, M-mode, Doppler, and Color Doppler    Study date:  2019    Patient: Dwayne Cheadle  MR number: [id number]  Account number: [de-identified]  : 1933  Age: 80 years  Gender: Female  Status: Inpatient  Location: Bedside  Height: 61 in  Weight: 134 6 lb  BP: 117/ 55 mmHg    Indications: ATRIAL FIBRILLATION    Diagnoses: I48 0 - Atrial fibrillation    Primary Physician:  Scott Kaur MD  Interpreting Physician:  DO ROEL Elena    LEFT VENTRICLE:  Systolic function was normal by visual assessment  Ejection fraction was estimated to be 60 %  There were no regional wall motion abnormalities  There was mild concentric hypertrophy  Doppler parameters were consistent with elevated mean left atrial filling pressure  MITRAL VALVE:  There was mild regurgitation  AORTIC VALVE:  There was no evidence for stenosis  There was mild regurgitation  TRICUSPID VALVE:  There was mild regurgitation  Pulmonary artery systolic pressure was mildly increased  PULMONIC VALVE:  There was mild regurgitation  HISTORY: PRIOR HISTORY: MURMUR    PROCEDURE: The procedure was performed at the bedside  This was a routine study  The transthoracic approach was used  The study included complete 2D imaging, M-mode, complete spectral Doppler, and color Doppler  The heart rate was 68 bpm,  at the start of the study  Image quality was adequate  LEFT VENTRICLE: Size was normal  Systolic function was normal by visual assessment  Ejection fraction was estimated to be 60 %  There were no regional wall motion abnormalities  There was mild concentric hypertrophy  DOPPLER: Doppler  parameters were consistent with elevated mean left atrial filling pressure      RIGHT VENTRICLE: The size was normal  Systolic function was normal  DOPPLER: Systolic pressure was within the normal range  LEFT ATRIUM: Size was normal  No thrombus was identified  RIGHT ATRIUM: Size was normal     MITRAL VALVE: There was annular calcification  Valve structure was normal  There was normal leaflet separation  No echocardiographic evidence for prolapse  DOPPLER: The transmitral velocity was within the normal range  There was no  evidence for stenosis  There was mild regurgitation  AORTIC VALVE: The valve was trileaflet  Leaflets exhibited normal thickness, mild calcification, mildly reduced cuspal separation, and sclerosis  DOPPLER: Transaortic velocity was within the normal range  There was no evidence for  stenosis  There was mild regurgitation  TRICUSPID VALVE: The valve structure was normal  There was normal leaflet separation  DOPPLER: The transtricuspid velocity was within the normal range  There was mild regurgitation  Pulmonary artery systolic pressure was mildly increased  Estimated peak PA pressure was 44 mmHg  PULMONIC VALVE: Leaflets exhibited normal thickness, no calcification, and normal cuspal separation  DOPPLER: The transpulmonic velocity was within the normal range  There was mild regurgitation  PERICARDIUM: There was no thickening  There was no pericardial effusion  AORTA: The root exhibited normal size      PULMONARY ARTERY: The size was normal  The morphology appeared normal     SYSTEM MEASUREMENT TABLES    2D mode  AoR Diam 2D: 3 1 cm  LA Diam (2D): 3 4 cm  LA/Ao (2D): 1 1  FS (2D Teich): 35 5 %  IVSd (2D): 0 86 cm  LVDEV: 66 3 cm³  LVESV: 22 8 cm³  LVIDd(2D): 3 91 cm  LVISd (2D): 2 52 cm  LVOT Area 2D: 2 01 cm squared  LVPWd (2D): 0 62 cm  SV (Teich): 43 5 cm³    Apical four chamber  LVEF A4C: 77 %    Unspecified Scan Mode  INGRIS Cont Eq (Peak Mamadou): 1 32 cm squared  INGRIS Cont Eq (VTI): 1 1 cm squared  LVOT (VTI): 25 8 cm  LVOT Diam : 1 6 cm  LVOT Vmax: 1290 mm/s  LVOT Vmax; Mean: 1290 mm/s  Peak Grad ; Mean: 7 mm[Hg]  SV (LVOT): 52 cm³  VTI;Mean: 4 mm[Hg]  INGRIS Cont Eq (VTI): 1 93 cm squared  MV Peak A Mamadou: 780 mm/s  MV Peak E Mamadou   Mean: 982 mm/s  RVSP: 39 mm[Hg]  Max P mm[Hg]  V Max: 3290 mm/s  Vmax: 2990 mm/s  TAPSE: 1 8 cm    [facility]    Prepared and electronically signed by    Lizett iRch DO  Signed 2019 11:45:31       EKG/TELE: Personally reviewed  Sinus rhythm    Imaging: I have personally reviewed pertinent films in PACS

## 2019-01-20 NOTE — ASSESSMENT & PLAN NOTE
A/e/b leukocytosis with bandemia, tachycardia  pneumonia, failed outpatient therapy  Chest x-ray revealed a right apical nodule  CT chest showed multifocal pneumonia  · She was on doxycyline and azactam here and transitioned to doxycycline only on discharge to complete a 7 day course as per Pulmonary recommendations  · urine for strep and legionella negative  · Blood cultures negative  · Serial procalcitonins negative  · Follow-up Pulmonary after discharge    Follow-up CT scan in 4-6 weeks as per Pulmonary

## 2019-01-20 NOTE — ED PROVIDER NOTES
History  Chief Complaint   Patient presents with    Atrial Fibrillation     patient went to her PCP for a rash she developed while taking Keflex  Found to be in afib 150-160s, with no history of afib  Paramedics started a line, gave diltiazem 15mg, and a fluid bolus in the field  Patient arrives alert, oriented, speaking in full sentences in no accute distress  HPI    69-year-old female that presents today with atrial fibrillation  Patient states for the past few days she has not been feeling well  She states she has been having chills at home  She went to her PCP who gave her antibiotics  She developed a rash afterwards  Patient states she went to her PCP again as and found out that she was in atrial fibrillation with a rate in the 180s  Patient received 15 or Cardizem prior to arrival by paramedics  Now patient's heart rate down into the 100s  Patient states she has never had atrial fibrillation before does not have any cardiac problems  Patient states she has been having a cough congestion sick contacts at home  Denies any chest pain  Mild shortness of breath  Patient is slightly hypoxic 90% with no history of COPD or asthma  Patient is not a smoker  Patient does have a rash appreciated on her back and her arms for which she has not taken anything for  80 year female that presents today with new onset AFib  Patient is also septic  Will require antibiotics  Will give steroids for rash  Patient unsure when she went into atrial fibrillation  No cardioversion  Patient will be admitted to the hospital     Prior to Admission Medications   Prescriptions Last Dose Informant Patient Reported? Taking?    alendronate (FOSAMAX) 70 mg tablet  Self No Yes   Sig: TAKE ONE TABLET BY MOUTH ONCE EVERY WEEK   Patient taking differently: TAKE ONE TABLET BY MOUTH ONCE EVERY WEEK ON SATURDAYS   ezetimibe-simvastatin (VYTORIN) 10-20 mg per tablet 1/17/2019 at 2200 Self No Yes   Sig: TAKE ONE TABLET BY MOUTH EVERY DAY AT BEDTIME   hydrochlorothiazide (HYDRODIURIL) 25 mg tablet 1/18/2019 at 0800 Self No Yes   Sig: Take 1 tablet (25 mg total) by mouth daily   lisinopril (ZESTRIL) 2 5 mg tablet 1/18/2019 at 0800 Self No Yes   Sig: Take 1 tablet (2 5 mg total) by mouth daily      Facility-Administered Medications: None       Past Medical History:   Diagnosis Date    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[address] )     Community acquired pneumonia     last assessed: 10/11/16    Hyperlipidemia     Hypertension        Past Surgical History:   Procedure Laterality Date    APPENDECTOMY      BOWEL RESECTION      CATARACT EXTRACTION EXTRACAPSULAR W/ INTRAOCULAR LENS IMPLANTATION Bilateral     COLECTOMY      Partial    COLON SURGERY      colon cancer    COLONOSCOPY N/A 8/5/2016    Procedure: COLONOSCOPY;  Surgeon: Nelson Lindsey MD;  Location: [facility]; Service:    [facility] EYE SURGERY      HYSTERECTOMY      THROAT SURGERY      TRACHEAL SURGERY      tumor removed benign    TUMOR REMOVAL N/A 2000    trachea       Family History   Problem Relation Age of Onset    Diabetes Sister     Hypertension Sister      I have reviewed and agree with the history as documented  Social History   Substance Use Topics    Smoking status: Never Smoker    Smokeless tobacco: Never Used    Alcohol use No        Review of Systems   Constitutional: Positive for chills and fatigue  Negative for diaphoresis and fever  HENT: Negative  Respiratory: Positive for cough and shortness of breath  Negative for wheezing  Cardiovascular: Negative  Negative for chest pain, palpitations and leg swelling  Gastrointestinal: Negative for abdominal distention, abdominal pain, nausea and vomiting  Genitourinary: Negative  Musculoskeletal: Negative  Skin: Positive for rash  Neurological: Positive for weakness  Psychiatric/Behavioral: Negative  All other systems reviewed and are negative        Physical Exam  Physical Exam   Constitutional: She is oriented to person, place, and time  She appears well-developed and well-nourished  She appears distressed  HENT:   Head: Normocephalic and atraumatic  Mouth/Throat: Oropharynx is clear and moist    Eyes: Pupils are equal, round, and reactive to light  EOM are normal  Right eye exhibits no discharge  Left eye exhibits no discharge  Neck: Normal range of motion  Neck supple  Cardiovascular: Normal heart sounds  No murmur heard  Regularly irregular   Pulmonary/Chest: Effort normal  She has rales  Abdominal: Soft  Bowel sounds are normal  She exhibits no distension  There is no tenderness  There is no rebound and no guarding  Musculoskeletal: Normal range of motion  She exhibits no edema or deformity  Neurological: She is alert and oriented to person, place, and time  Skin: Skin is warm and dry  Capillary refill takes 2 to 3 seconds  There is pallor  Psychiatric: She has a normal mood and affect         Vital Signs  ED Triage Vitals   Temperature Pulse Respirations Blood Pressure SpO2   01/18/19 1517 01/18/19 1509 01/18/19 1509 01/18/19 1509 01/18/19 1509   98 6 °F (37 °C) 105 20 104/54 94 %      Temp Source Heart Rate Source Patient Position - Orthostatic VS BP Location FiO2 (%)   01/18/19 1517 01/18/19 1509 01/18/19 2021 01/18/19 2021 --   Tympanic Monitor Sitting Left arm       Pain Score       01/18/19 2335       No Pain           Vitals:    01/19/19 2027 01/20/19 0009 01/20/19 0310 01/20/19 0931   BP: 149/64 124/60 121/58 126/60   Pulse: 81 67 67 85   Patient Position - Orthostatic VS:  Lying Lying Sitting       Visual Acuity      ED Medications  Medications   ezetimibe 10 mg-pravastatin 40 mg combo dose ( Oral Given 1/19/19 2218)   ondansetron (ZOFRAN) injection 4 mg (not administered)   enoxaparin (LOVENOX) subcutaneous injection 60 mg (60 mg Subcutaneous Given 1/20/19 0937)   metoprolol tartrate (LOPRESSOR) tablet 25 mg (25 mg Oral Given 1/19/19 2235)   aztreonam (AZACTAM) 2,000 mg in sodium chloride 0 9 % 100 mL IVPB (2,000 mg Intravenous New Bag 1/20/19 0810)   doxycycline hyclate (VIBRAMYCIN) capsule 100 mg (100 mg Oral Given 1/19/19 2223)   furosemide (LASIX) injection 40 mg (40 mg Intravenous Given 1/20/19 0937)   diphenhydrAMINE (BENADRYL) tablet 25 mg (25 mg Oral Given 1/19/19 0646)   acetaminophen (TYLENOL) tablet 650 mg (650 mg Oral Given 1/20/19 1111)   sodium chloride 0 9 % bolus 1,000 mL (0 mL Intravenous Stopped 1/18/19 1932)   predniSONE tablet 40 mg (40 mg Oral Given 1/18/19 1619)   cefepime (MAXIPIME) 2 g/50 mL dextrose IVPB (0 mg Intravenous Stopped 1/18/19 1732)   diphenhydrAMINE (BENADRYL) injection 25 mg (25 mg Intravenous Given 1/18/19 1834)   potassium chloride (K-DUR,KLOR-CON) CR tablet 40 mEq (40 mEq Oral Given 1/19/19 0940)       Diagnostic Studies  Results Reviewed     Procedure Component Value Units Date/Time    Blood culture #1 [[id number]] Collected:  01/18/19 1614    Lab Status:  Preliminary result Specimen:  Blood from Arm, Left Updated:  01/19/19 2101     Blood Culture No Growth at 24 hrs  Blood culture #2 [[id number]] Collected:  01/18/19 1614    Lab Status:  Preliminary result Specimen:  Blood from Arm, Left Updated:  01/19/19 2101     Blood Culture No Growth at 24 hrs      Urine Microscopic [[id number]]  (Abnormal) Collected:  01/18/19 1724    Lab Status:  Final result Specimen:  Urine from Urine, Clean Catch Updated:  01/18/19 1741     RBC, UA 0-1 (A) /hpf      WBC, UA None Seen /hpf      Epithelial Cells Occasional /hpf      Bacteria, UA Occasional /hpf     UA w Reflex to Microscopic [[id number]]  (Abnormal) Collected:  01/18/19 1724    Lab Status:  Final result Specimen:  Urine from Urine, Clean Catch Updated:  01/18/19 1731     Color, UA Light Yellow     Clarity, UA Clear     Specific Gravity, UA <=1 005     pH, UA 7 5     Leukocytes, UA Negative     Nitrite, UA Negative     Protein, UA Negative mg/dl      Glucose, UA Negative mg/dl      Ketones, UA Negative mg/dl      Urobilinogen, UA 0 2 E U /dl      Bilirubin, UA Negative     Blood, UA Trace-Intact (A)    Lactic acid, plasma [[id number]]  (Normal) Collected:  01/18/19 1614    Lab Status:  Final result Specimen:  Blood from Arm, Left Updated:  01/18/19 1645     LACTIC ACID 1 7 mmol/L     Narrative:         Result may be elevated if tourniquet was used during collection  CBC and differential [[id number]]  (Abnormal) Collected:  01/18/19 1520    Lab Status:  Final result Specimen:  Blood from Arm, Right Updated:  01/18/19 1602     WBC 21 32 (H) Thousand/uL      RBC 4 68 Million/uL      Hemoglobin 14 6 g/dL      Hematocrit 43 9 %      MCV 94 fL      MCH 31 2 pg      MCHC 33 3 g/dL      RDW 13 0 %      MPV 9 7 fL      Platelets 860 (H) Thousands/uL      nRBC 0 /100 WBCs     Narrative: This is an appended report  These results have been appended to a previously verified report      B-type natriuretic peptide [[id number]]  (Abnormal) Collected:  01/18/19 1520    Lab Status:  Final result Specimen:  Blood from Arm, Right Updated:  01/18/19 1550     NT-proBNP 1,957 (H) pg/mL     Troponin I [[id number]]  (Normal) Collected:  01/18/19 1520    Lab Status:  Final result Specimen:  Blood from Arm, Right Updated:  01/18/19 1547     Troponin I <0 02 ng/mL     Comprehensive metabolic panel [[id number]]  (Abnormal) Collected:  01/18/19 1520    Lab Status:  Final result Specimen:  Blood from Arm, Right Updated:  01/18/19 1545     Sodium 133 (L) mmol/L      Potassium 3 5 mmol/L      Chloride 97 (L) mmol/L      CO2 28 mmol/L      ANION GAP 8 mmol/L      BUN 16 mg/dL      Creatinine 1 04 mg/dL      Glucose 148 (H) mg/dL      Calcium 8 1 (L) mg/dL      AST 50 (H) U/L      ALT 41 U/L      Alkaline Phosphatase 115 U/L      Total Protein 6 8 g/dL      Albumin 1 9 (L) g/dL      Total Bilirubin 0 90 mg/dL      eGFR 49 ml/min/1 73sq m     Narrative:         National Kidney Disease Education Program recommendations are as follows:  GFR calculation is accurate only with a steady state creatinine  Chronic Kidney disease less than 60 ml/min/1 73 sq  meters  Kidney failure less than 15 ml/min/1 73 sq  meters  CT chest wo contrast   Final Result by Fransisco Boogie MD (01/19 1264)      1  Scattered patchy consolidation infiltrates bilaterally compatible with multifocal pneumonia  Recommend follow-up to resolution  Follow-up chest CT recommended in 6-8 weeks  2  Enlarged right pericardiac lymph node, question reactive  This can be reassessed on follow-up  3   New small right pleural effusion  4   Partially visualized large cyst in the left upper quadrant likely of renal origin  This could be followed up with renal ultrasound  The study was marked in EPIC for significant notification  Workstation performed: [id number]         XR chest 2 views   Final Result by Mindy Rao MD (01/18 4786)      Ill-defined 1 8 cm right apical nodule  Recommend follow-up CT scan  Small right pleural effusion  Workstation performed: [id number]                    Procedures  ECG 12 Lead Documentation  Date/Time: 1/20/2019 11:35 AM  Performed by: Carly Segura by: Sindi Burks     Indications / Diagnosis:  AFib  Patient location:  ED  Previous ECG:     Previous ECG:  Compared to current    Similarity:  Changes noted  Interpretation:     Interpretation: abnormal    Rate:     ECG rate assessment: tachycardic    Rhythm:     Rhythm: atrial fibrillation    Ectopy:     Ectopy: none    QRS:     QRS axis:  Normal  Conduction:     Conduction: normal    ST segments:     ST segments:  Normal  T waves:     T waves: normal             Phone Contacts  ED Phone Contact    ED Course  ED Course as of Jan 20 1135   Fri Jan 18, 2019   1604 Bands Relative: (!) 13   200 Called the radiologist who believe this might be a pneumonia  Patient last admit to hospital was given rocephin with no allergic reaction   Will give cefepime and olga lidiar                            Initial Sepsis Screening     Row Name 01/18/19 5647                Is the patient's history suggestive of a new or worsening infection? (!)  Yes (Proceed)  -SK        Suspected source of infection suspect infection, source unknown  -SK        Are two or more of the following signs & symptoms of infection both present and new to the patient? (!)  Yes (Proceed)  -SK        Indicate SIRS criteria Tachycardia > 90 bpm;Tachypnea > 20 resp per min;Leukocytosis (WBC > 55918 IJL);WBC > 10% bands  -SK        If the answer is yes to both questions, suspicion of sepsis is present          If severe sepsis is present AND tissue hypoperfusion perists in the hour after fluid resuscitation or lactate > 4, the patient meets criteria for SEPTIC SHOCK          Are any of the following organ dysfunction criteria present within 6 hours of suspected infection and SIRS criteria that are NOT considered to be chronic conditions? (!)  Yes  -SK        Organ dysfunction          Date of presentation of severe sepsis          Time of presentation of severe sepsis          Tissue hypoperfusion persists in the hour after crystalloid fluid administration, evidenced, by either:          Was hypotension present within one hour of the conclusion of crystalloid fluid administration?           Date of presentation of septic shock          Time of presentation of septic shock            User Key  (r) = Recorded By, (t) = Taken By, (c) = Cosigned By    [address] Name Provider Type    SK Marcel Ruiz MD Physician                  [facility]  CritCare Time    Disposition  Final diagnoses:   Sepsis ([facility] [address] )   A-fib ([facility] [address] )   Pneumonia     Time reflects when diagnosis was documented in both MDM as applicable and the Disposition within this note     Time User Action Codes Description Comment    1/18/2019  5:31 PM Mati Dick U  8  [A41 9] Sepsis ([facility] [address] )     1/18/2019  5:31 PM Mati Dick U  8  [I48 91] A-fib ([facility] [address] ) 1/18/2019  5:31 PM Violette Arzate Add [J18 9] Pneumonia     1/19/2019 10:51 AM Shyla Gonzalez Add [R91 8] Multilobar lung infiltrate       ED Disposition     ED Disposition Condition Comment    Admit  Case was discussed with medicine and the patient's admission status was agreed to be Admission Status: inpatient status to the service of Dr Cris Pope   Follow-up Information    None         Current Discharge Medication List      CONTINUE these medications which have NOT CHANGED    Details   alendronate (FOSAMAX) 70 mg tablet TAKE ONE TABLET BY MOUTH ONCE EVERY WEEK  Qty: 12 tablet, Refills: 3    Associated Diagnoses: Osteoporosis, unspecified osteoporosis type, unspecified pathological fracture presence      ezetimibe-simvastatin (VYTORIN) 10-20 mg per tablet TAKE ONE TABLET BY MOUTH EVERY DAY AT BEDTIME  Qty: 90 tablet, Refills: 3    Associated Diagnoses: Hyperlipidemia, unspecified hyperlipidemia type      hydrochlorothiazide (HYDRODIURIL) 25 mg tablet Take 1 tablet (25 mg total) by mouth daily  Qty: 90 tablet, Refills: 3    Associated Diagnoses: Benign essential hypertension      lisinopril (ZESTRIL) 2 5 mg tablet Take 1 tablet (2 5 mg total) by mouth daily  Qty: 90 tablet, Refills: 3    Associated Diagnoses: Benign essential hypertension           No discharge procedures on file      ED Provider  Electronically Signed by           Bianka Ibarra MD  01/20/19 4141

## 2019-01-20 NOTE — ASSESSMENT & PLAN NOTE
Continue Lopressor, lisinopril    okay to restart hydrochlorothiazide on discharge as per Cardiology

## 2019-01-20 NOTE — ASSESSMENT & PLAN NOTE
· BP elevated at 1900, increasing shortness of breath with exertion on admission  · Echocardiogram showed EF of 60% with with elevated LA filling pressure  · Cardiology consulted  She received IV Lasix while here    No Lasix or Demadex on discharge as per Cardiology  · Follow up with Cardiology after discharge  · Continue Lopressor

## 2019-01-21 ENCOUNTER — APPOINTMENT (INPATIENT)
Dept: RADIOLOGY | Facility: HOSPITAL | Age: 84
DRG: 871 | End: 2019-01-21
Payer: MEDICARE

## 2019-01-21 VITALS
RESPIRATION RATE: 18 BRPM | DIASTOLIC BLOOD PRESSURE: 59 MMHG | BODY MASS INDEX: 24.89 KG/M2 | WEIGHT: 131.84 LBS | OXYGEN SATURATION: 94 % | SYSTOLIC BLOOD PRESSURE: 126 MMHG | TEMPERATURE: 96.7 F | HEIGHT: 61 IN | HEART RATE: 72 BPM

## 2019-01-21 PROBLEM — N28.1 RENAL CYST: Status: ACTIVE | Noted: 2019-01-21

## 2019-01-21 PROBLEM — A41.9 SEPSIS DUE TO PNEUMONIA (HCC): Status: RESOLVED | Noted: 2019-01-18 | Resolved: 2019-01-21

## 2019-01-21 PROBLEM — E87.8 ELECTROLYTE ABNORMALITY: Status: RESOLVED | Noted: 2019-01-19 | Resolved: 2019-01-21

## 2019-01-21 PROBLEM — J90 PLEURAL EFFUSION ON RIGHT: Status: ACTIVE | Noted: 2019-01-21

## 2019-01-21 PROBLEM — D75.839 THROMBOCYTOSIS: Status: ACTIVE | Noted: 2019-01-21

## 2019-01-21 PROBLEM — R21 RASH: Status: RESOLVED | Noted: 2019-01-19 | Resolved: 2019-01-21

## 2019-01-21 PROBLEM — I48.91 ATRIAL FIBRILLATION WITH RAPID VENTRICULAR RESPONSE (HCC): Status: RESOLVED | Noted: 2019-01-18 | Resolved: 2019-01-21

## 2019-01-21 PROBLEM — I50.31 ACUTE DIASTOLIC CONGESTIVE HEART FAILURE (HCC): Status: RESOLVED | Noted: 2019-01-18 | Resolved: 2019-01-21

## 2019-01-21 PROBLEM — R59.0 MEDIASTINAL ADENOPATHY: Status: ACTIVE | Noted: 2019-01-21

## 2019-01-21 PROBLEM — J18.9 SEPSIS DUE TO PNEUMONIA (HCC): Status: RESOLVED | Noted: 2019-01-18 | Resolved: 2019-01-21

## 2019-01-21 LAB
ANION GAP SERPL CALCULATED.3IONS-SCNC: 3 MMOL/L (ref 4–13)
ATRIAL RATE: 340 BPM
BUN SERPL-MCNC: 16 MG/DL (ref 5–25)
CALCIUM SERPL-MCNC: 8.5 MG/DL (ref 8.3–10.1)
CHLORIDE SERPL-SCNC: 100 MMOL/L (ref 100–108)
CO2 SERPL-SCNC: 33 MMOL/L (ref 21–32)
CREAT SERPL-MCNC: 0.68 MG/DL (ref 0.6–1.3)
ERYTHROCYTE [DISTWIDTH] IN BLOOD BY AUTOMATED COUNT: 13.6 % (ref 11.6–15.1)
GFR SERPL CREATININE-BSD FRML MDRD: 80 ML/MIN/1.73SQ M
GLUCOSE SERPL-MCNC: 92 MG/DL (ref 65–140)
HCT VFR BLD AUTO: 45.1 % (ref 34.8–46.1)
HGB BLD-MCNC: 14.1 G/DL (ref 11.5–15.4)
MAGNESIUM SERPL-MCNC: 2 MG/DL (ref 1.6–2.6)
MCH RBC QN AUTO: 30.9 PG (ref 26.8–34.3)
MCHC RBC AUTO-ENTMCNC: 31.3 G/DL (ref 31.4–37.4)
MCV RBC AUTO: 99 FL (ref 82–98)
PLATELET # BLD AUTO: 494 THOUSANDS/UL (ref 149–390)
PMV BLD AUTO: 9.4 FL (ref 8.9–12.7)
POTASSIUM SERPL-SCNC: 4 MMOL/L (ref 3.5–5.3)
QRS AXIS: 44 DEGREES
QRSD INTERVAL: 80 MS
QT INTERVAL: 344 MS
QTC INTERVAL: 441 MS
RBC # BLD AUTO: 4.56 MILLION/UL (ref 3.81–5.12)
SODIUM SERPL-SCNC: 136 MMOL/L (ref 136–145)
T WAVE AXIS: 68 DEGREES
VENTRICULAR RATE: 99 BPM
WBC # BLD AUTO: 8.38 THOUSAND/UL (ref 4.31–10.16)

## 2019-01-21 PROCEDURE — 71045 X-RAY EXAM CHEST 1 VIEW: CPT

## 2019-01-21 PROCEDURE — 94668 MNPJ CHEST WALL SBSQ: CPT

## 2019-01-21 PROCEDURE — 85027 COMPLETE CBC AUTOMATED: CPT | Performed by: STUDENT IN AN ORGANIZED HEALTH CARE EDUCATION/TRAINING PROGRAM

## 2019-01-21 PROCEDURE — 80048 BASIC METABOLIC PNL TOTAL CA: CPT | Performed by: STUDENT IN AN ORGANIZED HEALTH CARE EDUCATION/TRAINING PROGRAM

## 2019-01-21 PROCEDURE — 99239 HOSP IP/OBS DSCHRG MGMT >30: CPT | Performed by: FAMILY MEDICINE

## 2019-01-21 PROCEDURE — 99232 SBSQ HOSP IP/OBS MODERATE 35: CPT | Performed by: INTERNAL MEDICINE

## 2019-01-21 PROCEDURE — 94761 N-INVAS EAR/PLS OXIMETRY MLT: CPT

## 2019-01-21 PROCEDURE — 83735 ASSAY OF MAGNESIUM: CPT | Performed by: STUDENT IN AN ORGANIZED HEALTH CARE EDUCATION/TRAINING PROGRAM

## 2019-01-21 PROCEDURE — 97110 THERAPEUTIC EXERCISES: CPT

## 2019-01-21 PROCEDURE — 97535 SELF CARE MNGMENT TRAINING: CPT

## 2019-01-21 PROCEDURE — 93010 ELECTROCARDIOGRAM REPORT: CPT | Performed by: INTERNAL MEDICINE

## 2019-01-21 RX ORDER — GUAIFENESIN 600 MG
600 TABLET, EXTENDED RELEASE 12 HR ORAL EVERY 12 HOURS SCHEDULED
Status: DISCONTINUED | OUTPATIENT
Start: 2019-01-21 | End: 2019-01-21 | Stop reason: HOSPADM

## 2019-01-21 RX ORDER — GUAIFENESIN 600 MG
600 TABLET, EXTENDED RELEASE 12 HR ORAL EVERY 12 HOURS SCHEDULED
Qty: 20 TABLET | Refills: 0 | Status: SHIPPED | OUTPATIENT
Start: 2019-01-21 | End: 2019-05-01 | Stop reason: ALTCHOICE

## 2019-01-21 RX ORDER — DOXYCYCLINE HYCLATE 100 MG/1
100 CAPSULE ORAL EVERY 12 HOURS SCHEDULED
Qty: 8 CAPSULE | Refills: 0 | Status: SHIPPED | OUTPATIENT
Start: 2019-01-21 | End: 2019-01-25

## 2019-01-21 RX ORDER — LISINOPRIL 5 MG/1
5 TABLET ORAL DAILY
Qty: 30 TABLET | Refills: 0 | Status: SHIPPED | OUTPATIENT
Start: 2019-01-21 | End: 2019-02-07 | Stop reason: ALTCHOICE

## 2019-01-21 RX ORDER — LISINOPRIL 5 MG/1
5 TABLET ORAL DAILY
Status: DISCONTINUED | OUTPATIENT
Start: 2019-01-21 | End: 2019-01-21 | Stop reason: HOSPADM

## 2019-01-21 RX ADMIN — RIVAROXABAN 20 MG: 10 TABLET, FILM COATED ORAL at 16:17

## 2019-01-21 RX ADMIN — AZTREONAM 2000 MG: 2 INJECTION, POWDER, LYOPHILIZED, FOR SOLUTION INTRAMUSCULAR; INTRAVENOUS at 15:16

## 2019-01-21 RX ADMIN — LISINOPRIL 5 MG: 5 TABLET ORAL at 11:03

## 2019-01-21 RX ADMIN — GUAIFENESIN 600 MG: 600 TABLET, EXTENDED RELEASE ORAL at 15:16

## 2019-01-21 RX ADMIN — METOPROLOL TARTRATE 25 MG: 25 TABLET, FILM COATED ORAL at 10:17

## 2019-01-21 RX ADMIN — DOXYCYCLINE 100 MG: 100 CAPSULE ORAL at 10:17

## 2019-01-21 RX ADMIN — AZTREONAM 2000 MG: 2 INJECTION, POWDER, LYOPHILIZED, FOR SOLUTION INTRAMUSCULAR; INTRAVENOUS at 08:06

## 2019-01-21 NOTE — PROGRESS NOTES
Progress Note - Pulmonary   Wing Fuentes 80 y o  female MRN: [id number]  Unit/Bed#: [address] [place] [address] Encounter: [id number]    Assessment:  Bilateral multifocal pneumonia  Community-acquired pneumonia  Right pericadiac  lymphadenopathy  Small right-sided pleural effusion    Plan:  Currently patient is saturating well on room air at rest,  Reviewed CT of the chest results with bilateral multifocal pneumonia patchy right greater than left  Currently on history and I am and doxycycline, broke out with rash with cefepime and azithromycin   Completed antibiotics for a total duration of 1 week  Right pericardiac lymphadenopathy likely reactive  Outpatient pulmonary follow-up with repeat imaging in 6-8 weeks for resolution of the pneumonia  Leukocytosis has currently trended down  Continue with airway clearance measures with Mucinex and incentive spirometry and flutter valve  Small right-sided pleural effusion with acute congestive heart failure with increased BNP  Currently on prednisone 40 mg daily for the rash with Benadryl slowly tapered down  Outpatient pulmonary follow-up           Subjective:   Feeling much better with her breathing, the cough has significantly subsided  Today I was able to walk to the bathroom and come back    Objective:     Vitals: Blood pressure 165/71, pulse 78, temperature 97 6 °F (36 4 °C), resp  rate 18, height 5' 1" (1 549 m), weight 64 2 kg (141 lb 8 6 oz), SpO2 92 %  ,Body mass index is 26 74 kg/m²        Intake/Output Summary (Last 24 hours) at 01/20/19 2026  Last data filed at 01/20/19 1801   Gross per 24 hour   Intake                0 ml   Output             1550 ml   Net            -1550 ml       Invasive Devices     Peripheral Intravenous Line            Peripheral IV 01/19/19 Right Antecubital 1 day                Physical Exam: Not in any acute respiratory distress  Eyes anicteric sclera, conjunctivae clear  ENT nares is patent, no evidence of any discharge  Lungs bilateral diminished breath sounds few basal rales right base posteriorly  Heart first and second heart sounds are heard no murmur or gallop is heard  Extremities no pedal edema  CNS awake alert oriented ×3  Labs:   CBC:   Lab Results   Component Value Date    WBC 9 97 01/20/2019    HGB 13 0 01/20/2019    HCT 40 9 01/20/2019    MCV 97 01/20/2019     (H) 01/20/2019    MCH 30 9 01/20/2019    MCHC 31 8 01/20/2019    RDW 13 5 01/20/2019    MPV 9 4 01/20/2019     Imaging and other studies: I have personally reviewed pertinent reports

## 2019-01-21 NOTE — ASSESSMENT & PLAN NOTE
Day #4 of ABx currently on Aztreonam and Doxycycline  -Continue Doxycycline for a total of 7 days antibiotic course

## 2019-01-21 NOTE — ASSESSMENT & PLAN NOTE
Incidental finding of possible renal cyst in the left upper quadrant on CT chest  Renal ultrasound for further evaluation after discharge

## 2019-01-21 NOTE — OCCUPATIONAL THERAPY NOTE
OT TREATMENT       01/21/19 1427   Restrictions/Precautions   Other Precautions Fall Risk;O2;Chair Alarm; Bed Alarm   Pain Assessment   Pain Assessment No/denies pain   Pain Score No Pain   ADL   Grooming Assistance 5  Supervision/Setup   Grooming Comments Pt washed hands at sink with supervision  Hung cane on counter while washing and able to maintain balance  UB Dressing Assistance 5  Supervision/Setup   UB Dressing Comments Pt able to don robe in standing without loss of balance  Benefitted from supervision as pt does not use cane while dressing  Functional Standing Tolerance   Time 2 minutes   Activity Stood to don robe and wash hands while standing at sink   Transfers   Sit to Stand 5  Supervision   Stand to Sit 5  Supervision   Functional Mobility   Functional Mobility 4  Minimal assistance   Additional Comments 10 feet to bathroom, then walked approximtely 50 feet in burgess  Request hand hold assist while ambulating  States that this is not available at home as she lives alone  Additional items SPC   ROM- Right Upper Extremities   R Shoulder AROM; Flexion; Horizontal ABduction   R Elbow AROM;Elbow flexion   R Wrist AROM; Wrist extension   R Position (Seated EOB)   R Weight/Reps/Sets 2 sets of 10   RUE ROM Comment No signs of fatigue or shortness of breath while performing exercises  ROM - Left Upper Extremities    L Shoulder AROM; Flexion; Horizontal ABduction   L Elbow AROM;Elbow flexion   L Wrist AROM; Wrist extension   L Position (Seated EOB)   L Weight/Reps/Sets 2 sets of 10   LUE ROM Comment No signs of fatigue or shortness of breath while performing exercises  Vision   Vision Comments Pt educated on eccentric viewing method to increase visualization of objects in center of vision  OT also emphasized environmental modifications (tactile cues on mivrowave oven) to increase safety with IADLs including cooking   Pt verbalized understanding and will benfit from home OT services to address low vision needs and safety at home  Activity Tolerance   Activity Tolerance Patient tolerated treatment well   Assessment   Assessment Pt tolerated treatment well and was willing to participate in all theraputic activities  Performed UE exercises at EOB demonstrating fair dynamic sitting balance and no signs of shortness of breath  Pt benefitted from supervision for dressing and grooming at sink with min A for walking greater distances  Pt will continue to benefit from occupational therapy to address safety and independence with ADL performance in addition to addressing safety with IADLs due to low vision  Plan   Treatment Interventions ADL retraining;Functional transfer training;UE strengthening/ROM; Endurance training; Compensatory technique education; Activityengagement   Recommendation   OT Discharge Recommendation Home OT   Licensure   [place] License Number  Ariel Shore [address] [id number]

## 2019-01-21 NOTE — SOCIAL WORK
Met with pt  Resides alone in a single floor apt with 13 steps to enter  Does not drive  Has friends who assist with transportation  +LW  POA is her stepson who [place] out of [place]  Wishes to change her POA to include a friend  Will provide A/D paperwork  Explained role of cm, no d/c needs  CM reviewed d/c planning process including the following: identifying help at home, patient preference for d/c planning needs, and availability of the treatment team to discuss questions or concerns patient and/or family may have regarding understanding of medications and recognizing signs and symptoms once discharged  CM also encouraged patient to follow up with all recommended appointments after discharge  Patient advised of importance for patient and family to participate in managing patient's medical well being

## 2019-01-21 NOTE — RESPIRATORY THERAPY NOTE
Home Oxygen Qualifying Test       Patient name: Jamie Dupont        : 1933   Date of Test:  2019  Diagnosis:      Home Oxygen Test:    **Medicare Guidelines require item(s) 1-5 on all ambulatory patients or 1 and 2 on non-ambulatory patients  1   Baseline SPO2 on Room Air at rest 91%  2   SPO2 during exercise on Room Air 89-91 %  During exercise monitor SpO2  If SPO2 increases >=89% with ambulation do not add supplemental             oxygen  If <= 88% on room air add O2 via NC and titrate patient  Patient must be ambulated with O2 and titrated to > 88% with exertion  3   SPO2 on Oxygen at rest NA % NA lpm     4   SPO2 during exercise on Oxygen  NA% a liter flow of NA lpm     5   Exercise performed:          walking, distance 600 (feet)          []  Supplemental Home Oxygen is indicated  [x]  Client does not qualify for home oxygen        Respiratory Additional Notes- Tolerated ambulation well with no dyspnea noted    Danika Coats, RT RRT/ACCS

## 2019-01-21 NOTE — PLAN OF CARE
CARDIOVASCULAR - ADULT     Maintains optimal cardiac output and hemodynamic stability Completed     Absence of cardiac dysrhythmias or at baseline rhythm Completed        Imtiaz Little Discharge to home or other facility with appropriate resources Completed        DISCHARGE PLANNING - CARE MANAGEMENT     Discharge to post-acute care or home with appropriate resources Completed        GASTROINTESTINAL - ADULT     Minimal or absence of nausea and/or vomiting Completed     Maintains or returns to baseline bowel function Completed     Maintains adequate nutritional intake Completed     Establish and maintain optimal ostomy function Completed        GENITOURINARY - ADULT     Maintains or returns to baseline urinary function Completed     Absence of urinary retention Completed     Urinary catheter remains patent Completed        HEMATOLOGIC - ADULT     Maintains hematologic stability Completed        INFECTION - ADULT     Absence or prevention of progression during hospitalization Completed        Knowledge Deficit     Patient/family/caregiver demonstrates understanding of disease process, treatment plan, medications, and discharge instructions Completed        METABOLIC, FLUID AND ELECTROLYTES - ADULT     Electrolytes maintained within normal limits Completed     Fluid balance maintained Completed     Glucose maintained within target range Completed        MUSCULOSKELETAL - ADULT     Maintain or return mobility to safest level of function Completed     Maintain proper alignment of affected body part Completed        NEUROSENSORY - ADULT     Achieves stable or improved neurological status Completed     Absence of seizures Completed     Remains free of injury related to seizures activity Completed     Achieves maximal functionality and self care Completed        PAIN - ADULT     Verbalizes/displays adequate comfort level or baseline comfort level Completed        Potential for Falls     Patient will remain free of falls Completed        RESPIRATORY - ADULT     Achieves optimal ventilation and oxygenation Completed        SAFETY ADULT     Maintain or return to baseline ADL function Completed     Maintain or return mobility status to optimal level Completed     Patient will remain free of falls Completed        SKIN/TISSUE INTEGRITY - ADULT     Skin integrity remains intact Completed     Incision(s), wounds(s) or drain site(s) healing without S/S of infection Completed     Oral mucous membranes remain intact Completed

## 2019-01-21 NOTE — PLAN OF CARE
CARDIOVASCULAR - ADULT     Maintains optimal cardiac output and hemodynamic stability Progressing     Absence of cardiac dysrhythmias or at baseline rhythm Progressing        DISCHARGE PLANNING     Discharge to home or other facility with appropriate resources Progressing        DISCHARGE PLANNING - CARE MANAGEMENT     Discharge to post-acute care or home with appropriate resources Progressing        GASTROINTESTINAL - ADULT     Minimal or absence of nausea and/or vomiting Progressing     Maintains or returns to baseline bowel function Progressing     Maintains adequate nutritional intake Progressing     Establish and maintain optimal ostomy function Progressing        GENITOURINARY - ADULT     Maintains or returns to baseline urinary function Progressing     Absence of urinary retention Progressing     Urinary catheter remains patent Progressing        HEMATOLOGIC - ADULT     Maintains hematologic stability Progressing        INFECTION - ADULT     Absence or prevention of progression during hospitalization Progressing        Knowledge Deficit     Patient/family/caregiver demonstrates understanding of disease process, treatment plan, medications, and discharge instructions Progressing        METABOLIC, FLUID AND ELECTROLYTES - ADULT     Electrolytes maintained within normal limits Progressing     Fluid balance maintained Progressing     Glucose maintained within target range Progressing        MUSCULOSKELETAL - ADULT     Maintain or return mobility to safest level of function Progressing     Maintain proper alignment of affected body part Progressing        NEUROSENSORY - ADULT     Achieves stable or improved neurological status Progressing     Absence of seizures Progressing     Remains free of injury related to seizures activity Progressing     Achieves maximal functionality and self care Progressing        PAIN - ADULT     Verbalizes/displays adequate comfort level or baseline comfort level Progressing        Potential for Falls     Patient will remain free of falls Progressing        RESPIRATORY - ADULT     Achieves optimal ventilation and oxygenation Progressing        SAFETY ADULT     Maintain or return to baseline ADL function Progressing     Maintain or return mobility status to optimal level Progressing     Patient will remain free of falls Progressing        SKIN/TISSUE INTEGRITY - ADULT     Skin integrity remains intact Progressing     Incision(s), wounds(s) or drain site(s) healing without S/S of infection Progressing     Oral mucous membranes remain intact Progressing

## 2019-01-21 NOTE — DISCHARGE SUMMARY
Discharge Summary - [facility] Internal Medicine    Patient Information: Sohan Cramer 80 y o  female MRN: [id number]  Unit/Bed#: [address]13 [facility] [address] Encounter: [id number]    Discharging Physician / Practitioner: Jamel Hilliard DO  PCP: Aime Hernandez MD  Admission Date: 1/18/2019  Discharge Date: 01/21/19    Reason for Admission: Atrial Fibrillation (patient went to her PCP for a rash she developed while taking Keflex  Found to be in afib 150-160s, with no history of afib  Paramedics started a line, gave diltiazem 15mg, and a fluid bolus in the field  Patient arrives alert, oriented, speaking in full sentences in no accute distress )      Discharge Diagnoses:     Principal Problem (Resolved):    Sepsis due to pneumonia [facility])  Active Problems:    Benign essential hypertension    Other hyperlipidemia    Pleural effusion on right    Renal cyst    Thrombocytosis (HCC)  Resolved Problems:    Atrial fibrillation with rapid ventricular response (HCC)    Acute diastolic congestive heart failure (HCC)    Electrolyte abnormality    Rash        * Sepsis due to pneumonia (HCC)resolved as of 1/21/2019   Assessment & Plan    A/e/b leukocytosis with bandemia, tachycardia  pneumonia, failed outpatient therapy  Chest x-ray revealed a right apical nodule  CT chest showed multifocal pneumonia  · She was on doxycyline and azactam here and transitioned to doxycycline only on discharge to complete a 7 day course as per Pulmonary recommendations  · urine for strep and legionella negative  · Blood cultures negative  · Serial procalcitonins negative  · Follow-up Pulmonary after discharge    Follow-up CT scan in 4-6 weeks as per Pulmonary     Atrial fibrillation with rapid ventricular response (HCC)resolved as of 1/21/2019   Assessment & Plan    Patient found to be in rapid atrial fibrillation in her PCPs office, in the ER rate is in the 80s after paramedics gave patient 15 mg of IV Cardizem  TSH wnl  · Continue Lopressor, Xarelto  · She was initially on weight based lovenox         Acute diastolic congestive heart failure (HCC)resolved as of 1/21/2019   Assessment & Plan    · BP elevated at 1900, increasing shortness of breath with exertion on admission  · Echocardiogram showed EF of 60% with with elevated LA filling pressure  · Cardiology consulted  She received IV Lasix while here  No Lasix or Demadex on discharge as per Cardiology  · Follow up with Cardiology after discharge  · Continue Lopressor     Thrombocytosis [facility])   Assessment & Plan    Noted on lab work in 2016 as well  Repeat lab work as outpatient and follow up with PCP     Renal cyst   Assessment & Plan    Incidental finding of possible renal cyst in the left upper quadrant on CT chest  Renal ultrasound for further evaluation after discharge     Pleural effusion on right   Assessment & Plan    Stable on repeat chest x-ray today     Other hyperlipidemia   Assessment & Plan    Continue Vytorin     Benign essential hypertension   Assessment & Plan    Continue Lopressor, lisinopril  okay to restart hydrochlorothiazide on discharge as per Cardiology     Rashresolved as of 1/21/2019   Assessment & Plan    · Appears to be allergy from antibiotic  Now resolved     Electrolyte abnormalityresolved as of 1/21/2019   Assessment & Plan    Resolved with repletion  Repeat lab work after discharge         Consultations During Hospital Stay:  [address] TO CASE MANAGEMENT  IP CONSULT TO PULMONOLOGY    Procedures Performed:     · echo    Significant Findings:     See hospital course and above    Imaging while in hospital:    Xr Chest Portable    Result Date: 1/21/2019  Narrative: CHEST INDICATION:   pneumonia  COMPARISON:  1/18/2018; CT chest 1/19/2018 EXAM PERFORMED/VIEWS:  XR CHEST PORTABLE Images: 1 FINDINGS: Cardiomediastinal silhouette appears unremarkable   Patchy right upper lobe infiltrates stable as is a small pleural effusion and compressive atelectasis at the lung bases  Left lung clear  No pneumothorax  Osseous structures appear within normal limits for patient age  Impression: 1  Stable patchy right upper lobe infiltrate  Follow-up in 6-8 weeks recommended  2   Stable small right pleural effusion and basilar atelectasis  Workstation performed: [id number]     Xr Chest 2 Views    Result Date: 1/18/2019  Narrative: CHEST INDICATION:   afib  COMPARISON:  10/11/2016  EXAM PERFORMED/VIEWS:  XR CHEST PA & LATERAL FINDINGS:  Monitoring leads and clips project over the chest  Cardiomediastinal silhouette appears unremarkable  There is an ill-defined nodular opacity at the right apex measuring 1 8 cm  Small right pleural effusion  Osseous structures appear within normal limits for patient age  Impression: Ill-defined 1 8 cm right apical nodule  Recommend follow-up CT scan  Small right pleural effusion  Workstation performed: [id number]     Ct Chest Wo Contrast    Result Date: 1/19/2019  Narrative: CT CHEST WITHOUT IV CONTRAST INDICATION:   Cough, persistent; Pneumonia complicated / unresolved Shortness of breath  COMPARISON:  CT chest of 9/1/2016, chest x-ray 1/18/2019 TECHNIQUE: CT examination of the chest was performed without intravenous contrast   Axial, sagittal, and coronal 2D reformatted images were created from the source data and submitted for interpretation  Radiation dose length product (DLP) for this visit:  303 56 mGy-cm   This examination, like all CT scans performed in the [facility], was performed utilizing techniques to minimize radiation dose exposure, including the use of iterative  reconstruction and automated exposure control  FINDINGS: LUNGS:  Patchy consolidation noted in the right upper lobe  This likely accounts for the abnormality seen on chest x-ray  Additional scattered patchy infiltrates noted bilaterally involving all lobes more extensive on the right than on the left    Associated variable bronchial wall thickening  PLEURA:  Small right pleural effusion  HEART/GREAT VESSELS:  Moderate coronary artery calcifications  MEDIASTINUM AND VLADIMIR:  A large right pericardiac lymph node measures 2 0 x 1 6 cm image 32 series 2  Numerous calcified mediastinal lymph nodes noted  CHEST WALL AND LOWER NECK:   Unremarkable  VISUALIZED STRUCTURES IN THE UPPER ABDOMEN:  Partially visualized cyst in the left upper quadrant likely renal   This measures up to 9 3 cm in size  OSSEOUS STRUCTURES:  No acute fracture or destructive osseous lesion  Impression: 1  Scattered patchy consolidation infiltrates bilaterally compatible with multifocal pneumonia  Recommend follow-up to resolution  Follow-up chest CT recommended in 6-8 weeks  2  Enlarged right pericardiac lymph node, question reactive  This can be reassessed on follow-up  3   New small right pleural effusion  4   Partially visualized large cyst in the left upper quadrant likely of renal origin  This could be followed up with renal ultrasound  The study was marked in EPIC for significant notification  Workstation performed: [id number]       Incidental Findings:   · Enlarged right pericardiac lymph node  · Possible renal cyst    Test Results Pending at Discharge (will require follow up):   · As per After Visit Summary     Outpatient Tests Requested:  · CBC, renal U/S    Complications:  See hospital course and above    Hospital Course:     Jackie Crenshaw is a 80 y o  female patient who originally presented to the hospital on 1/18/2019 due to atrial fibrillation  patient also reported cough, shortness of breath, congestion and was started on Keflex and then developed a rash  She was seen at her primary care doctor's office and was found to be in atrial fibrillation with rapid heart rate  She was given IV Cardizem by EMS  In the ER she received a dose of cefepime and Zithromax  Patient was admitted and seen by Cardiology and Pulmonary    She did convert to sinus rhythm  She received IV Lasix for CHF exacerbation  She was on Azactam and doxycycline for multifocal pneumonia  Her symptoms did improve while here  She was cleared by all consultants involved in her care prior to discharge  Home oxygen qualifying test was done and she did not qualify for oxygen  Offered to call family to discuss discharge plan but patient declined    Please see above list of diagnoses and related plan for additional information  Condition at Discharge: stable     Discharge Day Visit / Exam:     Subjective:  Reports improvement in her breathing, cough and would like to go home    Vitals: Blood Pressure: 126/59 (01/21/19 1530)  Pulse: 72 (01/21/19 1530)  Temperature: (!) 96 7 °F (35 9 °C) (01/21/19 1530)  Temp Source: Tympanic (01/21/19 1530)  Respirations: 18 (01/21/19 1530)  Height: 5' 1" (154 9 cm) (01/18/19 2021)  Weight - Scale: 59 8 kg (131 lb 13 4 oz) (01/21/19 0558)  SpO2: 94 % (01/21/19 1530)  Exam:   Physical Exam   Constitutional: She is oriented to person, place, and time  She appears well-developed and well-nourished  No distress  HENT:   Head: Normocephalic and atraumatic  Mouth/Throat: Oropharynx is clear and moist    Eyes: EOM are normal  Right eye exhibits no discharge  Left eye exhibits no discharge  No scleral icterus  Neck: Neck supple  No tracheal deviation present  Cardiovascular: Normal rate and regular rhythm  Murmur heard  Pulmonary/Chest: Effort normal  No respiratory distress  She has wheezes (intermittent)  She has no rales  Abdominal: Soft  Bowel sounds are normal  She exhibits no distension  There is no tenderness  Musculoskeletal: She exhibits no edema  Neurological: She is alert and oriented to person, place, and time  No cranial nerve deficit  Skin: Skin is dry  She is not diaphoretic  Psychiatric: She has a normal mood and affect         Discharge instructions/Information to patient and family:(Discharge Medications and Follow up): See after visit summary for information provided to patient and family  Provisions for Follow-Up Care:  See after visit summary for information related to follow-up care and any pertinent home health orders  Disposition: Home with [facility]    Planned Readmission:  No     Discharge Statement:  I spent > 30 minutes discharging the patient  This time was spent on the day of discharge  I had direct contact with the patient on the day of discharge  Greater than 50% of the total time was spent examining patient, answering all patient questions, arranging and discussing plan of care with patient as well as directly providing post-discharge instructions  Additional time then spent on discharge activities  Discharge Medications:  See after visit summary for reconciled discharge medications provided to patient and family  ** Please Note: "This note has been constructed using a voice recognition system  Therefore there may be syntax, spelling, and/or grammatical errors   Please call if you have any questions  "**

## 2019-01-21 NOTE — PLAN OF CARE
Problem: OCCUPATIONAL THERAPY ADULT  Goal: Performs self-care activities at highest level of function for planned discharge setting  See evaluation for individualized goals  Outcome: Progressing  Limitation: Decreased ADL status, Decreased UE strength, Decreased Safe judgement during ADL, Decreased endurance, Decreased self-care trans, Decreased high-level ADLs  Prognosis: Good  Assessment: Pt tolerated treatment well and was willing to participate in all theraputic activities  Performed UE exercises at EOB demonstrating fair dynamic sitting balance and no signs of shortness of breath  Pt benefitted from supervision for dressing and grooming at sink with min A for walking greater distances  Pt will continue to benefit from occupational therapy to address safety and independence with ADL performance in addition to addressing safety with IADLs due to low vision       OT Discharge Recommendation: Home OT

## 2019-01-21 NOTE — PROGRESS NOTES
Progress Note - Cardiology   [facility]     Shauna Costa 80 y o  female MRN: [id number]  : 1933  Unit/Bed#: [address] [place] [address] Encounter: [id number]    Assessment and Plan:   1  Paroxysmal atrial fibrillation in the face of multifocal pneumonia:  Patient converted to sinus rhythm after receiving IV Cardizem by EMS and started on beta-blocker  No further episodes noted  Would continue low-dose beta-blocker and Xarelto at time of discharge  Discussed with patient outpatient stress test and follow up with Dr Astrid Louis  2  Multifocal pneumonia:  IV antibiotics per the primary team     3  Acute diastolic heart failure: Will reintroduce lisinopril at 5 mg and continue to monitor blood pressure  4  Hypertension:  Will reintroduce ACE-inhibitor  5  Dyslipidemia:  Continue pravastatin and Zetia  Subjective / Objective:   Patient seen examined  She is doing well  She states her breathing is greatly improved since admission to the hospital   Remains sinus rhythm on telemetry  Discussed with patient with continue beta-blocker and anticoagulation for these a 1 month period of time possibly up to 6 months and then discontinue  Discussed outpatient stress test     Vitals: Blood pressure 136/61, pulse 77, temperature 97 8 °F (36 6 °C), temperature source Oral, resp  rate 18, height 5' 1" (1 549 m), weight 59 8 kg (131 lb 13 4 oz), SpO2 96 %  Vitals:    19 0600 19 0558   Weight: 64 2 kg (141 lb 8 6 oz) 59 8 kg (131 lb 13 4 oz)     Body mass index is 24 91 kg/m²  BP Readings from Last 3 Encounters:   19 136/61   19 120/70   19 126/70     Orthostatic Blood Pressures      Most Recent Value   Blood Pressure  136/61 filed at 2019 1017   Patient Position - Orthostatic VS  Lying filed at 2019 0808        I/O        07 -  0700  07 -  0700  07 -  0700    P  O   180     Total Intake(mL/kg)  180 (3)     Urine (mL/kg/hr) 1050 (0 7) 2000 (1 4)     Total Output 1050 2000      Net -1050 -1820                 Invasive Devices     Peripheral Intravenous Line            Peripheral IV 01/19/19 Right Antecubital 1 day                  Intake/Output Summary (Last 24 hours) at 01/21/19 1033  Last data filed at 01/21/19 0345   Gross per 24 hour   Intake              180 ml   Output             1450 ml   Net            -1270 ml         Physical Exam:   Physical Exam   Constitutional: She is oriented to person, place, and time  She appears well-developed and well-nourished  No distress  HENT:   Head: Normocephalic and atraumatic  Right Ear: External ear normal    Left Ear: External ear normal    Eyes: Pupils are equal, round, and reactive to light  Conjunctivae are normal  Right eye exhibits no discharge  Left eye exhibits no discharge  No scleral icterus  Neck: Normal range of motion  Neck supple  No JVD present  No thyromegaly present  Cardiovascular: Normal rate, regular rhythm, intact distal pulses and normal pulses  Exam reveals no gallop  Murmur heard  Systolic murmur is present with a grade of 2/6   Pulmonary/Chest: Effort normal  No respiratory distress  Decreased with scattered fine crackles on expiration, no wheezing   Abdominal: Soft  Bowel sounds are normal  She exhibits no distension  There is no tenderness  Musculoskeletal: Normal range of motion  She exhibits no edema  Neurological: She is alert and oriented to person, place, and time  Skin: Skin is warm and dry  She is not diaphoretic  Psychiatric: She has a normal mood and affect  Nursing note and vitals reviewed              Medications/ Allergies:     Current Facility-Administered Medications:  acetaminophen 650 mg Oral Q6H PRN Memory Canavan, MD    aztreonam 2,000 mg Intravenous Q8H NILO Dunlap Last Rate: 2,000 mg (01/21/19 0806)   diphenhydrAMINE 25 mg Oral Q6H PRN NILO Dunlap    doxycycline hyclate 100 mg Oral Q12H [facility] Estrella DOMÍNGUEZ Arnie England, CRNP    ezetimibe 10 mg-pravastatin 40 mg combo dose  Oral HS Rosey Conception, CRNP    lisinopril 5 mg Oral Daily Destiney Khan, CRNP    metoprolol tartrate 25 mg Oral BID Rosey Conception, CRNP    ondansetron 4 mg Intravenous Q8H PRN Rosey Conception, CRNP    rivaroxaban 20 mg Oral Daily With Dinner Navtej Isidoro, DO        acetaminophen 650 mg Q6H PRN   diphenhydrAMINE 25 mg Q6H PRN   ondansetron 4 mg Q8H PRN     Allergies   Allergen Reactions    Azithromycin     Niacin And Related     Keflex [Cephalexin] Rash       VTE Pharmacologic Prophylaxis:   Sequential compression device (Venodyne)     Labs:   Troponins:  Results from last 7 days  Lab Units 01/19/19  0122 01/18/19  2258 01/18/19  1520   TROPONIN I ng/mL <0 02 <0 02 <0 02     CBC with diff:  Results from last 7 days  Lab Units 01/21/19  0626 01/20/19  0540 01/19/19  0641 01/18/19  1520   WBC Thousand/uL 8 38 9 97 16 19* 21 32*   HEMOGLOBIN g/dL 14 1 13 0 14 2 14 6   HEMATOCRIT % 45 1 40 9 43 5 43 9   MCV fL 99* 97 96 94   PLATELETS Thousands/uL 494* 447* 482* 469*   MCH pg 30 9 30 9 31 4 31 2   MCHC g/dL 31 3* 31 8 32 6 33 3   RDW % 13 6 13 5 13 2 13 0   MPV fL 9 4 9 4 9 5 9 7   NRBC AUTO /100 WBCs  --   --  0 0     CMP:  Results from last 7 days  Lab Units 01/21/19  0626 01/20/19  0540 01/19/19  0641 01/18/19  1520   SODIUM mmol/L 136 136 137 133*   POTASSIUM mmol/L 4 0 4 0 3 3* 3 5   CHLORIDE mmol/L 100 101 99* 97*   CO2 mmol/L 33* 31 30 28   ANION GAP mmol/L 3* 4 8 8   BUN mg/dL 16 16 17 16   CREATININE mg/dL 0 68 0 63 0 76 1 04   CALCIUM mg/dL 8 5 8 3 8 3 8 1*   AST U/L  --   --   --  50*   ALT U/L  --   --   --  41   ALK PHOS U/L  --   --   --  115   TOTAL PROTEIN g/dL  --   --   --  6 8   ALBUMIN g/dL  --   --   --  1 9*   TOTAL BILIRUBIN mg/dL  --   --   --  0 90   EGFR ml/min/1 73sq m 80 82 72 49     Magnesium:  Results from last 7 days  Lab Units 01/21/19  0626 01/20/19  0540 01/19/19  0641   MAGNESIUM mg/dL 2 0 2 1 2 3     TSH:  Results from last 7 days  Lab Units 01/19/19  0641   TSH 3RD GENERATON uIU/mL 0 734     NT-proBNP:   Recent Labs      01/18/19   1520   NTBNP  1,957*        Imaging & Testing   I have personally reviewed pertinent reports  Xr Chest 2 Views    Result Date: 1/18/2019  Narrative: CHEST INDICATION:   afib  COMPARISON:  10/11/2016  EXAM PERFORMED/VIEWS:  XR CHEST PA & LATERAL FINDINGS:  Monitoring leads and clips project over the chest  Cardiomediastinal silhouette appears unremarkable  There is an ill-defined nodular opacity at the right apex measuring 1 8 cm  Small right pleural effusion  Osseous structures appear within normal limits for patient age  Impression: Ill-defined 1 8 cm right apical nodule  Recommend follow-up CT scan  Small right pleural effusion  Workstation performed: [id number]     Ct Chest Wo Contrast    Result Date: 1/19/2019  Narrative: CT CHEST WITHOUT IV CONTRAST INDICATION:   Cough, persistent; Pneumonia complicated / unresolved Shortness of breath  COMPARISON:  CT chest of 9/1/2016, chest x-ray 1/18/2019 TECHNIQUE: CT examination of the chest was performed without intravenous contrast   Axial, sagittal, and coronal 2D reformatted images were created from the source data and submitted for interpretation  Radiation dose length product (DLP) for this visit:  303 56 mGy-cm   This examination, like all CT scans performed in the [facility], was performed utilizing techniques to minimize radiation dose exposure, including the use of iterative  reconstruction and automated exposure control  FINDINGS: LUNGS:  Patchy consolidation noted in the right upper lobe  This likely accounts for the abnormality seen on chest x-ray  Additional scattered patchy infiltrates noted bilaterally involving all lobes more extensive on the right than on the left  Associated variable bronchial wall thickening  PLEURA:  Small right pleural effusion   HEART/GREAT VESSELS:  Moderate coronary artery calcifications  MEDIASTINUM AND VLADIMIR:  A large right pericardiac lymph node measures 2 0 x 1 6 cm image 32 series 2  Numerous calcified mediastinal lymph nodes noted  CHEST WALL AND LOWER NECK:   Unremarkable  VISUALIZED STRUCTURES IN THE UPPER ABDOMEN:  Partially visualized cyst in the left upper quadrant likely renal   This measures up to 9 3 cm in size  OSSEOUS STRUCTURES:  No acute fracture or destructive osseous lesion  Impression: 1  Scattered patchy consolidation infiltrates bilaterally compatible with multifocal pneumonia  Recommend follow-up to resolution  Follow-up chest CT recommended in 6-8 weeks  2  Enlarged right pericardiac lymph node, question reactive  This can be reassessed on follow-up  3   New small right pleural effusion  4   Partially visualized large cyst in the left upper quadrant likely of renal origin  This could be followed up with renal ultrasound  The study was marked in EPIC for significant notification  Workstation performed: [id number]        EKG / Monitor: Personally reviewed  Sinus rhythm    Cardiac testing:   Results for orders placed during the hospital encounter of 19   Echo complete with contrast if indicated    Narrative [address]  [address] [id number]  [phone]    Transthoracic Echocardiogram  2D, M-mode, Doppler, and Color Doppler    Study date:  2019    Patient: Jenn Mariee  MR number: [id number]  Account number: [de-identified]  : 1933  Age: 80 years  Gender: Female  Status: Inpatient  Location: Bedside  Height: 61 in  Weight: 134 6 lb  BP: 117/ 55 mmHg    Indications: ATRIAL FIBRILLATION    Diagnoses: I48 0 - Atrial fibrillation    Primary Physician:  Tk Bright MD  Interpreting Physician:  Galina Moulton DO    SUMMARY    LEFT VENTRICLE:  Systolic function was normal by visual assessment  Ejection fraction was estimated to be 60 %    There were no regional wall motion abnormalities  There was mild concentric hypertrophy  Doppler parameters were consistent with elevated mean left atrial filling pressure  MITRAL VALVE:  There was mild regurgitation  AORTIC VALVE:  There was no evidence for stenosis  There was mild regurgitation  TRICUSPID VALVE:  There was mild regurgitation  Pulmonary artery systolic pressure was mildly increased  PULMONIC VALVE:  There was mild regurgitation  HISTORY: PRIOR HISTORY: MURMUR    PROCEDURE: The procedure was performed at the bedside  This was a routine study  The transthoracic approach was used  The study included complete 2D imaging, M-mode, complete spectral Doppler, and color Doppler  The heart rate was 68 bpm,  at the start of the study  Image quality was adequate  LEFT VENTRICLE: Size was normal  Systolic function was normal by visual assessment  Ejection fraction was estimated to be 60 %  There were no regional wall motion abnormalities  There was mild concentric hypertrophy  DOPPLER: Doppler  parameters were consistent with elevated mean left atrial filling pressure  RIGHT VENTRICLE: The size was normal  Systolic function was normal  DOPPLER: Systolic pressure was within the normal range  LEFT ATRIUM: Size was normal  No thrombus was identified  RIGHT ATRIUM: Size was normal     MITRAL VALVE: There was annular calcification  Valve structure was normal  There was normal leaflet separation  No echocardiographic evidence for prolapse  DOPPLER: The transmitral velocity was within the normal range  There was no  evidence for stenosis  There was mild regurgitation  AORTIC VALVE: The valve was trileaflet  Leaflets exhibited normal thickness, mild calcification, mildly reduced cuspal separation, and sclerosis  DOPPLER: Transaortic velocity was within the normal range  There was no evidence for  stenosis  There was mild regurgitation      TRICUSPID VALVE: The valve structure was normal  There was normal leaflet separation  DOPPLER: The transtricuspid velocity was within the normal range  There was mild regurgitation  Pulmonary artery systolic pressure was mildly increased  Estimated peak PA pressure was 44 mmHg  PULMONIC VALVE: Leaflets exhibited normal thickness, no calcification, and normal cuspal separation  DOPPLER: The transpulmonic velocity was within the normal range  There was mild regurgitation  PERICARDIUM: There was no thickening  There was no pericardial effusion  AORTA: The root exhibited normal size  PULMONARY ARTERY: The size was normal  The morphology appeared normal     SYSTEM MEASUREMENT TABLES    2D mode  AoR Diam 2D: 3 1 cm  LA Diam (2D): 3 4 cm  LA/Ao (2D): 1 1  FS (2D Teich): 35 5 %  IVSd (2D): 0 86 cm  LVDEV: 66 3 cm³  LVESV: 22 8 cm³  LVIDd(2D): 3 91 cm  LVISd (2D): 2 52 cm  LVOT Area 2D: 2 01 cm squared  LVPWd (2D): 0 62 cm  SV (Teich): 43 5 cm³    Apical four chamber  LVEF A4C: 77 %    Unspecified Scan Mode  INGRIS Cont Eq (Peak Mamadou): 1 32 cm squared  INGRIS Cont Eq (VTI): 1 1 cm squared  LVOT (VTI): 25 8 cm  LVOT Diam : 1 6 cm  LVOT Vmax: 1290 mm/s  LVOT Vmax; Mean: 1290 mm/s  Peak Grad ; Mean: 7 mm[Hg]  SV (LVOT): 52 cm³  VTI;Mean: 4 mm[Hg]  INGRIS Cont Eq (VTI): 1 93 cm squared  MV Peak A Mamadou: 780 mm/s  MV Peak E Mamadou  Mean: 982 mm/s  RVSP: 39 mm[Hg]  Max P mm[Hg]  V Max: 3290 mm/s  Vmax: 2990 mm/s  TAPSE: 1 8 cm    [employer] Accredited Echocardiography Laboratory    Prepared and electronically signed by    Jarrod Burroughs DO  Signed 2019 11:45:31         Elaina Neighbor        "This note has been constructed using a voice recognition system  Therefore there may be syntax, spelling, and/or grammatical errors   Please call if you have any questions  "

## 2019-01-21 NOTE — PROGRESS NOTES
Progress Note - Pulmonary   Mattrine Montana 80 y o  female MRN: [id number]  Unit/Bed#: [address] [facility] 406-01 Encounter: [id number]      Assessment/Plan:       Mediastinal adenopathy   Assessment & Plan    -will require follow up CT scanning to ensure resolution in approx 4-6 wk     Pleural effusion on right   Assessment & Plan    -likely simple parapneumonic effusion   -will f/u in the outpatient setting for resolution     Multifocal pneumonia   Assessment & Plan    Day #4 of ABx currently on Aztreonam and Doxycycline  -Continue Doxycycline for a total of 7 days antibiotic course         -d/c home with 3 more days of abx  -pt drops 02 sat to 88% on RA > possible atelectatic > will ambulate for sat eval  -home 02 eval for possible oxygen qualifier  -f/u in 7-10 days as outpatient      ______________________________________________________________________    Subjective: Pt seen and examined at bedside  No complaints  Feels well  Would like to go home  Tele Events:     Vitals:   Temp:  [96 7 °F (35 9 °C)-97 8 °F (36 6 °C)] 97 8 °F (36 6 °C)  HR:  [50-78] 77  Resp:  [18] 18  BP: (102-168)/(51-75) 136/61  Weight (last 2 days)     Date/Time   Weight    01/21/19 0558  59 8 (131 84)    01/20/19 0600  64 2 (141 54)            Room Air  IV Infusions:       Nutrition:        Diet Orders            Start     Ordered    01/20/19 0754  Room Service  Once     Question:  Type of Service  Answer:  Room Service - Appropriate with Assistance    01/20/19 0753    01/19/19 1034  Room Service  Once     Question:  Type of Service  Answer:  Room Service - Appropriate with Assistance    01/19/19 1033    01/18/19 2028  Diet Regular; Regular House  Diet effective now     Question Answer Comment   Diet Type Regular    Regular Regular House    RD to adjust diet per protocol? Yes        01/18/19 2042          Ins/Outs:   I/O       01/19 0701 - 01/20 0700 01/20 0701 - 01/21 0700 01/21 0701 - 01/22 0700    P  O   180     Total Intake(mL/kg)  180 (3) Urine (mL/kg/hr) 1050 (0 7) 2000 (1 4)     Total Output 1050 2000      Net -1050 -1820                   Lines/Drains:  Invasive Devices     Peripheral Intravenous Line            Peripheral IV 01/19/19 Right Antecubital 1 day                 Active medications:  Scheduled Meds:  Current Facility-Administered Medications:  acetaminophen 650 mg Oral Q6H PRN Trinidad Gamez MD    aztreonam 2,000 mg Intravenous Q8H Union Slim, CRNP Last Rate: 2,000 mg (01/21/19 0806)   diphenhydrAMINE 25 mg Oral Q6H PRN Union Slim, CRNP    doxycycline hyclate 100 mg Oral Q12H [address] Union Slim, CRNP    ezetimibe 10 mg-pravastatin 40 mg combo dose  Oral HS Union Slim, CRNP    lisinopril 5 mg Oral Daily Sunday Jatin, CRNP    metoprolol tartrate 25 mg Oral BID Union Slim, CRNP    ondansetron 4 mg Intravenous Q8H PRN Union Slim, CRNP    rivaroxaban 20 mg Oral Daily With Dinner Orquidea Chaudhry, DO      PRN Meds:  acetaminophen 650 mg Q6H PRN   diphenhydrAMINE 25 mg Q6H PRN   ondansetron 4 mg Q8H PRN     ____________________________________________________________________      Physical Exam   Constitutional: She is oriented to person, place, and time  She appears well-developed and well-nourished  HENT:   Head: Normocephalic and atraumatic  Eyes: Pupils are equal, round, and reactive to light  Conjunctivae are normal    Neck: Normal range of motion  Neck supple  Cardiovascular: Normal rate, regular rhythm and normal heart sounds  Pulmonary/Chest: Effort normal  No accessory muscle usage  No tachypnea  No respiratory distress  She has no decreased breath sounds  She has wheezes in the left upper field  She has no rhonchi  She has no rales  She exhibits no tenderness  Abdominal: Soft  Bowel sounds are normal    Musculoskeletal: Normal range of motion  She exhibits no edema  Neurological: She is alert and oriented to person, place, and time  Skin: Skin is warm and dry     Psychiatric: She has a normal mood and affect            ____________________________________________________________________    Labs:   CBC:   Results from last 7 days  Lab Units 01/21/19  0626 01/20/19  0540 01/19/19  0641   WBC Thousand/uL 8 38 9 97 16 19*   HEMOGLOBIN g/dL 14 1 13 0 14 2   HEMATOCRIT % 45 1 40 9 43 5   MCV fL 99* 97 96   PLATELETS Thousands/uL 494* 447* 482*     CMP:   Results from last 7 days  Lab Units 01/21/19  0626 01/20/19  0540 01/19/19  0641 01/18/19  1520   POTASSIUM mmol/L 4 0 4 0 3 3* 3 5   CHLORIDE mmol/L 100 101 99* 97*   CO2 mmol/L 33* 31 30 28   BUN mg/dL 16 16 17 16   CREATININE mg/dL 0 68 0 63 0 76 1 04   CALCIUM mg/dL 8 5 8 3 8 3 8 1*   AST U/L  --   --   --  50*   ALT U/L  --   --   --  41   ALK PHOS U/L  --   --   --  115   EGFR ml/min/1 73sq m 80 82 72 49     No components found for: ABG    Magnesium:   Results from last 7 days  Lab Units 01/21/19  0626   MAGNESIUM mg/dL 2 0     Phosphorous:     Troponin:   Results from last 7 days  Lab Units 01/19/19  0122 01/18/19  2258   TROPONIN I ng/mL <0 02 <0 02     PT/INR:     Lactic Acid:   Results from last 7 days  Lab Units 01/18/19  1614   LACTIC ACID mmol/L 1 7     BNP:   Results from last 7 days  Lab Units 01/18/19  1520   NT-PRO BNP pg/mL 1,957*     TSH:   Results from last 7 days  Lab Units 01/19/19  0641   TSH 3RD GENERATON uIU/mL 0 734     Procalcitonin: Invalid input(s): PROCALCITONIN      Imaging:   CT chest wo contrast   Final Result by Neli Ramos MD (01/19 3693)      1  Scattered patchy consolidation infiltrates bilaterally compatible with multifocal pneumonia  Recommend follow-up to resolution  Follow-up chest CT recommended in 6-8 weeks  2  Enlarged right pericardiac lymph node, question reactive  This can be reassessed on follow-up  3   New small right pleural effusion  4   Partially visualized large cyst in the left upper quadrant likely of renal origin  This could be followed up with renal ultrasound        The study was marked in EPIC for significant notification  Workstation performed: [id number]         XR chest 2 views   Final Result by Guillaume Perez MD (01/18 1609)      Ill-defined 1 8 cm right apical nodule  Recommend follow-up CT scan  Small right pleural effusion  Workstation performed: [id number]               Micro: Lab Results   Component Value Date    BLOODCX No Growth at 48 hrs  01/18/2019    BLOODCX No Growth at 48 hrs  01/18/2019    BLOODCX No Growth After 5 Days  08/01/2016    BLOODCX No Growth After 5 Days   08/01/2016    URINECX >100,000 cfu/ml Klebsiella pneumoniae (A) 10/17/2018    URINECX No Growth <1000 cfu/mL 08/01/2016    SPUTUMCULTUR 2+ Growth of Candida sp  presumptively albicans 08/03/2016    SPUTUMCULTUR 1+ Growth of Mixed Respiratory Laurence 08/03/2016    MRSACULTURE  01/18/2019     No Methicillin Resistant Staphlyococcus aureus (MRSA) isolated            Invalid input(s): LEGIONELLAURINARYANTIGEN        Code Status: Level 3 - DNAR and DNI

## 2019-01-21 NOTE — SOCIAL WORK
Met with pt  Is ready or d/c today  Rehab recommending home therapy for pt  Discussed [facility] options with pt, will go with [facility]  Referral made  Provided pt with A/D info  No other d/c needs

## 2019-01-21 NOTE — NURSING NOTE
Patient discharged to home, medication and discharge instructions given, patient verbalized understanding, written information on new medications were given, IV removed, belongings gathered patient left unit via wheelchair with family members

## 2019-01-22 ENCOUNTER — TRANSITIONAL CARE MANAGEMENT (OUTPATIENT)
Dept: FAMILY MEDICINE CLINIC | Facility: CLINIC | Age: 84
End: 2019-01-22

## 2019-01-23 ENCOUNTER — OFFICE VISIT (OUTPATIENT)
Dept: FAMILY MEDICINE CLINIC | Facility: CLINIC | Age: 84
End: 2019-01-23
Payer: MEDICARE

## 2019-01-23 VITALS
HEIGHT: 61 IN | HEART RATE: 72 BPM | BODY MASS INDEX: 24.73 KG/M2 | DIASTOLIC BLOOD PRESSURE: 70 MMHG | WEIGHT: 131 LBS | SYSTOLIC BLOOD PRESSURE: 126 MMHG | TEMPERATURE: 97.9 F | OXYGEN SATURATION: 94 % | RESPIRATION RATE: 18 BRPM

## 2019-01-23 DIAGNOSIS — I48.0 PAROXYSMAL ATRIAL FIBRILLATION (HCC): ICD-10-CM

## 2019-01-23 DIAGNOSIS — I10 BENIGN ESSENTIAL HYPERTENSION: Primary | ICD-10-CM

## 2019-01-23 DIAGNOSIS — J18.9 PNEUMONIA OF RIGHT UPPER LOBE DUE TO INFECTIOUS ORGANISM: ICD-10-CM

## 2019-01-23 LAB
BACTERIA BLD CULT: NORMAL
BACTERIA BLD CULT: NORMAL

## 2019-01-23 PROCEDURE — 99495 TRANSJ CARE MGMT MOD F2F 14D: CPT | Performed by: FAMILY MEDICINE

## 2019-01-23 NOTE — PROGRESS NOTES
Chief Complaint   Patient presents with    Transition of Care Management     TCM Call (since 12/23/2018)     Date and time call was made  1/22/2019 10:16 AM    Patient was [place]  [facility] [place] [facility]    Date of Admission  01/18/19    Date of discharge  01/21/19    Diagnosis  Pneumonia    Disposition  Home    Were the patients medications reviewed and updated  Yes    Current Symptoms  Fatigue    Fatigue severity  Mild      TCM Call (since 12/23/2018)     Post hospital issues  None    Should patient be enrolled in anticoag monitoring? No    Scheduled for follow up? Yes    Did you obtain your prescribed medications  Yes    Do you need help managing your prescriptions or medications  No    Is transportation to your appointment needed  No    I have advised the patient to call PCP with any new or worsening symptoms  B  [place]en[place] LPN 58/07/6002    Living Arrangements  Alone    Are you recieving any outpatient services  No    Are you recieving home care services  Yes    Types of home care services  Home health aid    Are you using any community resources  No    Current waiver services  No    Have you fallen in the last 12 months  No    Interperter language line needed  No    Counseling  Patient         Patient ID: Mani Tomas is a 80 y o  female  HPI  Pt was sent to ER from the office for new onset Afib on 1/18/19 -  Was Dx with Sepsis due to pneumonia -  Was d/c on 1/21/19 -  Afib was converted by paramedics on the way to the hospital  -  Pt is eating OK, feeling almost back to baseline  -  Minimal cough remains     The following portions of the patient's history were reviewed and updated as appropriate: allergies, current medications, past family history, past medical history, past social history, past surgical history and problem list     Review of Systems   Constitutional: Positive for fatigue  Negative for fever  HENT: Negative  Respiratory: Negative  Cardiovascular: Negative  Gastrointestinal: Negative  Current Outpatient Prescriptions   Medication Sig Dispense Refill    alendronate (FOSAMAX) 70 mg tablet TAKE ONE TABLET BY MOUTH ONCE EVERY WEEK (Patient taking differently: TAKE ONE TABLET BY MOUTH ONCE EVERY WEEK ON SATURDAYS) 12 tablet 3    doxycycline hyclate (VIBRAMYCIN) 100 mg capsule Take 1 capsule (100 mg total) by mouth every 12 (twelve) hours for 4 days 8 capsule 0    ezetimibe-simvastatin (VYTORIN) 10-20 mg per tablet TAKE ONE TABLET BY MOUTH EVERY DAY AT BEDTIME 90 tablet 3    guaiFENesin (MUCINEX) 600 mg 12 hr tablet Take 1 tablet (600 mg total) by mouth every 12 (twelve) hours 20 tablet 0    hydrochlorothiazide (HYDRODIURIL) 25 mg tablet Take 1 tablet (25 mg total) by mouth daily 90 tablet 3    lisinopril (ZESTRIL) 5 mg tablet Take 1 tablet (5 mg total) by mouth daily 30 tablet 0    metoprolol tartrate (LOPRESSOR) 25 mg tablet Take 1 tablet (25 mg total) by mouth 2 (two) times a day 60 tablet 0    rivaroxaban (XARELTO) 20 mg tablet Take 1 tablet (20 mg total) by mouth daily with dinner 30 tablet 0     No current facility-administered medications for this visit  Objective:    /70 (BP Location: Right arm, Patient Position: Sitting, Cuff Size: Standard)   Pulse 72   Temp 97 9 °F (36 6 °C) (Tympanic)   Resp 18   Ht 5' 1" (1 549 m)   Wt 59 4 kg (131 lb)   SpO2 94%   BMI 24 75 kg/m²        Physical Exam   Constitutional: No distress  Cardiovascular: Normal rate and regular rhythm  Exam reveals no gallop  No murmur heard  Pulmonary/Chest: Effort normal and breath sounds normal  No respiratory distress  She has no wheezes  She has no rales  Abdominal: Soft  She exhibits no distension  There is no tenderness  There is no rebound  Skin: Skin is warm  No erythema  No pallor  Labs in chart were reviewed        Assessment/Plan:         Diagnoses and all orders for this visit:    Benign essential hypertension  -     Comprehensive metabolic panel; Future    Pneumonia of right upper lobe due to infectious organism ([facility] [address] )  -     CBC;  Future  F/u with pulmonologist   [facility]      f/u with cardiologist                       Zev Chavis MD

## 2019-01-25 ENCOUNTER — TRANSCRIBE ORDERS (OUTPATIENT)
Dept: ADMINISTRATIVE | Facility: HOSPITAL | Age: 84
End: 2019-01-25

## 2019-01-25 ENCOUNTER — TELEPHONE (OUTPATIENT)
Dept: INPATIENT UNIT | Facility: HOSPITAL | Age: 84
End: 2019-01-25

## 2019-01-25 ENCOUNTER — APPOINTMENT (OUTPATIENT)
Dept: LAB | Facility: HOSPITAL | Age: 84
End: 2019-01-25
Attending: FAMILY MEDICINE
Payer: MEDICARE

## 2019-01-25 ENCOUNTER — HOSPITAL ENCOUNTER (OUTPATIENT)
Dept: RADIOLOGY | Facility: HOSPITAL | Age: 84
Discharge: HOME | End: 2019-01-25
Attending: FAMILY MEDICINE
Payer: MEDICARE

## 2019-01-25 DIAGNOSIS — N28.1 RENAL CYST: ICD-10-CM

## 2019-01-25 DIAGNOSIS — D75.839 THROMBOCYTOSIS: ICD-10-CM

## 2019-01-25 DIAGNOSIS — J18.9 PNEUMONIA OF RIGHT UPPER LOBE DUE TO INFECTIOUS ORGANISM: ICD-10-CM

## 2019-01-25 DIAGNOSIS — E87.8 ELECTROLYTE ABNORMALITY: ICD-10-CM

## 2019-01-25 DIAGNOSIS — I50.31 ACUTE DIASTOLIC CONGESTIVE HEART FAILURE (HCC): ICD-10-CM

## 2019-01-25 DIAGNOSIS — I10 BENIGN ESSENTIAL HYPERTENSION: ICD-10-CM

## 2019-01-25 LAB
ALBUMIN SERPL BCP-MCNC: 2.4 G/DL (ref 3.5–5)
ALP SERPL-CCNC: 79 U/L (ref 46–116)
ALT SERPL W P-5'-P-CCNC: 37 U/L (ref 12–78)
ANION GAP SERPL CALCULATED.3IONS-SCNC: 5 MMOL/L (ref 4–13)
AST SERPL W P-5'-P-CCNC: 45 U/L (ref 5–45)
BILIRUB SERPL-MCNC: 1.1 MG/DL (ref 0.2–1)
BUN SERPL-MCNC: 13 MG/DL (ref 5–25)
CALCIUM SERPL-MCNC: 8.7 MG/DL (ref 8.3–10.1)
CHLORIDE SERPL-SCNC: 97 MMOL/L (ref 100–108)
CO2 SERPL-SCNC: 31 MMOL/L (ref 21–32)
CREAT SERPL-MCNC: 0.62 MG/DL (ref 0.6–1.3)
ERYTHROCYTE [DISTWIDTH] IN BLOOD BY AUTOMATED COUNT: 13.2 % (ref 11.6–15.1)
GFR SERPL CREATININE-BSD FRML MDRD: 82 ML/MIN/1.73SQ M
GLUCOSE SERPL-MCNC: 97 MG/DL (ref 65–140)
HCT VFR BLD AUTO: 45.8 % (ref 34.8–46.1)
HGB BLD-MCNC: 14.4 G/DL (ref 11.5–15.4)
MCH RBC QN AUTO: 30.8 PG (ref 26.8–34.3)
MCHC RBC AUTO-ENTMCNC: 31.4 G/DL (ref 31.4–37.4)
MCV RBC AUTO: 98 FL (ref 82–98)
PLATELET # BLD AUTO: 627 THOUSANDS/UL (ref 149–390)
PMV BLD AUTO: 8.9 FL (ref 8.9–12.7)
POTASSIUM SERPL-SCNC: 4.2 MMOL/L (ref 3.5–5.3)
PROT SERPL-MCNC: 7.4 G/DL (ref 6.4–8.2)
RBC # BLD AUTO: 4.68 MILLION/UL (ref 3.81–5.12)
SODIUM SERPL-SCNC: 133 MMOL/L (ref 136–145)
WBC # BLD AUTO: 9.08 THOUSAND/UL (ref 4.31–10.16)

## 2019-01-25 PROCEDURE — 36415 COLL VENOUS BLD VENIPUNCTURE: CPT

## 2019-01-25 PROCEDURE — 85027 COMPLETE CBC AUTOMATED: CPT

## 2019-01-25 PROCEDURE — 76770 US EXAM ABDO BACK WALL COMP: CPT

## 2019-01-25 PROCEDURE — 80053 COMPREHEN METABOLIC PANEL: CPT

## 2019-01-25 NOTE — TELEPHONE ENCOUNTER
Called pt for post discharge follow up  Pt states she say PCP on 1/22 and will see Dr Astrid Louis in Feb  Pt states she is having renal US today for renal cyst  Pt is not taking daily weights, states she does not have a scale   Denies SOB/edema

## 2019-01-28 ENCOUNTER — PATIENT OUTREACH (OUTPATIENT)
Dept: CASE MANAGEMENT | Facility: OTHER | Age: 84
End: 2019-01-28

## 2019-01-28 NOTE — PROGRESS NOTES
Occasional dry nonproductive cough persists  Patient denies SOB & chest pain, tightness, or pressure  Educated w/teach back on infection prevention  Educated w/teach back on CHF, & she wrote down S&S monitoring & management, & when to notify the doctor  She does not have a scale at this time & will have someone help her get a talking scale  She denies SOB, edema, chest pain, lightheadedness, & dizziness  She does not add salt to her food, but usually eats easy to make meals like frozen dinners  She will start reading food labels & will alternate the frozen dinners with lower sodium options  She has impaired vision bilaterally due to macular degeneration & uses a magnifying glass to read  She also has a h/o colon cancer & follows w/Dr Yvon Stanley routinely  Her friends & [employer] provide transportation, emotional support, & assistance w/household chores on occasion  She lives in a small apartment & has a cane, but ambulates independently at home  She is agreeable to outreach  Will mail [address] letter, CHF booklet, & sample low sodium diet

## 2019-01-31 ENCOUNTER — PATIENT OUTREACH (OUTPATIENT)
Dept: CASE MANAGEMENT | Facility: OTHER | Age: 84
End: 2019-01-31

## 2019-02-04 ENCOUNTER — PATIENT OUTREACH (OUTPATIENT)
Dept: CASE MANAGEMENT | Facility: OTHER | Age: 84
End: 2019-02-04

## 2019-02-05 ENCOUNTER — OFFICE VISIT (OUTPATIENT)
Dept: PULMONOLOGY | Facility: MEDICAL CENTER | Age: 84
End: 2019-02-05
Payer: MEDICARE

## 2019-02-05 VITALS
DIASTOLIC BLOOD PRESSURE: 62 MMHG | WEIGHT: 129 LBS | HEART RATE: 57 BPM | BODY MASS INDEX: 23.74 KG/M2 | RESPIRATION RATE: 12 BRPM | TEMPERATURE: 97.4 F | HEIGHT: 62 IN | SYSTOLIC BLOOD PRESSURE: 118 MMHG | OXYGEN SATURATION: 97 %

## 2019-02-05 DIAGNOSIS — J18.9 MULTIFOCAL PNEUMONIA: ICD-10-CM

## 2019-02-05 DIAGNOSIS — R59.0 MEDIASTINAL ADENOPATHY: ICD-10-CM

## 2019-02-05 DIAGNOSIS — J44.9 CHRONIC OBSTRUCTIVE PULMONARY DISEASE, UNSPECIFIED COPD TYPE (HCC): ICD-10-CM

## 2019-02-05 DIAGNOSIS — R05.9 COUGH: Primary | ICD-10-CM

## 2019-02-05 DIAGNOSIS — J90 PLEURAL EFFUSION ON RIGHT: ICD-10-CM

## 2019-02-05 PROCEDURE — 99214 OFFICE O/P EST MOD 30 MIN: CPT | Performed by: PHYSICIAN ASSISTANT

## 2019-02-05 PROCEDURE — 94010 BREATHING CAPACITY TEST: CPT | Performed by: PHYSICIAN ASSISTANT

## 2019-02-05 RX ORDER — ALBUTEROL SULFATE 2.5 MG/3ML
2.5 SOLUTION RESPIRATORY (INHALATION) ONCE
Status: COMPLETED | OUTPATIENT
Start: 2019-02-05 | End: 2019-02-05

## 2019-02-05 RX ORDER — ALBUTEROL SULFATE 1.25 MG/3ML
1.25 SOLUTION RESPIRATORY (INHALATION) ONCE
Status: DISCONTINUED | OUTPATIENT
Start: 2019-02-05 | End: 2019-02-05

## 2019-02-05 RX ADMIN — ALBUTEROL SULFATE 2.5 MG: 2.5 SOLUTION RESPIRATORY (INHALATION) at 13:33

## 2019-02-05 NOTE — PATIENT INSTRUCTIONS
Resolved Multifocal Pneumonia    Mediastinal Adenopathy and Pleural Effusion:  -follow up CT scan in approx 4 weeks    COPD:  -undetermined etiology  -should have complete PFT performed > will perform after  -call to schedule at the outpatient diagnostic center at [facility]   -start spiriva respimat > discuss on follow up phone conversation whether improvement with medication and will prescribe then  -rescue inhaler > to be discussed at follow up

## 2019-02-05 NOTE — ASSESSMENT & PLAN NOTE
-clinically has resolved with a full course of antibiotic treatment  -again will follow up the CT scan to ensure resolution of pneumonia

## 2019-02-05 NOTE — ASSESSMENT & PLAN NOTE
-will f/u w/ CT scan 6 weeks from 1/19 for ensurance of resolution  -most likely etiology secondary to pneumonia

## 2019-02-05 NOTE — PROGRESS NOTES
Assessment/Plan:    Problem List Items Addressed This Visit     Multifocal pneumonia     -clinically has resolved with a full course of antibiotic treatment  -again will follow up the CT scan to ensure resolution of pneumonia         Relevant Medications    albuterol inhalation solution 2 5 mg (Completed)    Other Relevant Orders    CT chest without contrast    Pleural effusion on right     -was presumptive parapneumonic in nature  -was not causing any previous respiratory compromise  -will monitor with follow-up CT for evidence of resolution         Relevant Medications    albuterol inhalation solution 2 5 mg (Completed)    Other Relevant Orders    CT chest without contrast    Mediastinal adenopathy     -will f/u w/ CT scan 6 weeks from 1/19 for ensurance of resolution  -most likely etiology secondary to pneumonia             Other Visit Diagnoses     Cough    -  Primary    Relevant Medications    albuterol inhalation solution 2 5 mg (Completed)    Other Relevant Orders    POCT spirometry (Completed)    Chronic obstructive pulmonary disease, unspecified COPD type ([facility] )        Relevant Medications    albuterol inhalation solution 2 5 mg (Completed)    Other Relevant Orders    Pulmonary function test          A gave 2 samples of Spiriva Respimat to the patient to last her for 1 month    In 1 months time when she gets her CT scan, plan is for the office to call her regarding results and to check up on her either symptoms improvement or maintenance with the Spiriva  We will discuss whether we should continue the Spiriva or discontinue altogether based on any type of symptoms improvement  We discussed that we will hold on script for nebulizer and albuterol inhaler at this time so as not to overwhelm the patient with too many new things  No Follow-up on file  All questions are answered to the patient's satisfaction and understanding  She verbalizes understanding    She is encouraged to call with any further questions or concerns  Portions of the record may have been created with voice recognition software  Occasional wrong word or "sound a like" substitutions may have occurred due to the inherent limitations of voice recognition software  Read the chart carefully and recognize, using context, where substitutions have occurred  Electronically Signed by Sherie Dover PA-C    ______________________________________________________________________    Chief Complaint:   Chief Complaint   Patient presents with    Pneumonia     HFU/ pt also had afib    Shortness of Breath     good    Cough     dry        Patient ID: Henrene Severs is a 80 y o  y o  female has a past medical history of 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[address] ); Community acquired pneumonia; Hyperlipidemia; and Hypertension  2/5/2019  Patient presents today for follow-up visit  Patient is seen in the office for post follow-up care from hospitalization  She was admitted to [facility] from 1/18 to 1/21/2019    I have copied and pasted her consult from Dr Keaton Gastelum for completeness sake and have placed it in quotations as follows:    "Assessment:  Bilateral multifocal pneumonia  Community-acquired pneumonia  Right pericardiac lymphadenopathy  Small right-sided pleural effusion        Plan:   Currently patient is saturating well on 2 L of oxygen by nasal cannula   Reviewed CT of the chest results with the patient with bilateral multifocal pneumonia patchy, right greater than left,  Currently on aztreonam and doxycycline, broke out with rash with cefepime and azithromycin last night in the ER  Right pericardiac lymphadenopathy likely reactive  Continue with antibiotics for a total duration of 1 week  Outpatient Pulmonary follow-up with repeat imaging in 6-8 weeks for resolution of the pneumonia  Leukocytosis is currently trending down  Continue with airway clearance measures with Mucinex, incentive spirometry and flutter valve    Small right-sided pleural effusion,? With acute congestive heart failure with increased BNP  Her also patient on prednisone 40 mg daily for the rash along with Benadryl  Thank you for the consultation will continue to follow         History of Present Illness     Physician Requesting Consult: Ana France MD  Reason for Consult / Principal Problem:   Hx and PE limited by: none  HPI: Jackie Crenshaw is a 80y o  year old female with no smoking history, past medical history significant for hypertension hyperlipidemia and history of colon adenocarcinoma status post surgery, states at baseline is able to walk around in the house do her daily chores, does not have any shortness of breath or wheezing, is able to climb up a flight of stairs on a regular basis,  No history of any recent travel, history of sick contacts is present  She states for the past 4-5 days has not been feeling well, had headaches comma cough shortness of breath runny nose as well as extremely tired and fatigued, called her PCP was given key flex she states she took it for 2 days broke into rash, and she was switched to another antibiotic does not remember the name, still was not feeling well, with worsening cough bringing up yellow green phlegm as well as a shortness of breath for which she went back to her PCPs office yesterday was found to be in AFib RVR, sent into the ER  Paramedics given 15 mg of IV Cardizem  She states she does feel better today"    She has completed her antibiotic course  She feels well  Not dyspneic with ambulation  No significant fatigue  No fevers, coughs, chills, or productive sputum  In the office we discussed that her spirometry has both a restrictive and obstructive component  We discussed that the obstructive component is persistent be on bronchodilator therapy  Ultimately complete pulmonary function testing would be necessary for a more clear picture of her obstruction of issue      We discussed starting Spiriva based on spirometry alone   She is going to utilizes 2 puffs daily until we follow up by phone call again  Ultimately the next follow-up should be gained after complete pulmonary function test     We discussed that her complete pulmonary function test can follow her follow-up CT scan for her pneumonia  The PFT can happen at a later date  Presumptively 6-8 weeks    HPI    Review of Systems   Constitutional: Negative for activity change, appetite change, chills, fatigue and fever  HENT: Negative for postnasal drip, rhinorrhea, sinus pain and sinus pressure  Eyes: Negative for visual disturbance  Respiratory: Negative for apnea, cough, chest tightness, shortness of breath, wheezing and stridor  Cardiovascular: Negative for chest pain and leg swelling  Gastrointestinal: Negative for diarrhea, nausea and vomiting  Endocrine: Negative for cold intolerance and heat intolerance  Musculoskeletal: Negative for arthralgias, back pain, joint swelling and myalgias  Skin: Negative for color change  Neurological: Negative for syncope and light-headedness  Hematological: Negative for adenopathy  Psychiatric/Behavioral: Negative for confusion and sleep disturbance  The following portions of the patient's history were reviewed and updated as appropriate: allergies, current medications, past family history, past medical history, past social history, past surgical history and problem list     Smoking history: She reports that she has never smoked  She has never used smokeless tobacco   Social history: She reports that she has never smoked  She has never used smokeless tobacco  She reports that she does not drink alcohol or use drugs    Past Medical History:   Diagnosis Date    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     Community acquired pneumonia     last assessed: 10/11/16    Hyperlipidemia     Hypertension      Past Surgical History:   Procedure Laterality Date    APPENDECTOMY      BOWEL RESECTION      CATARACT EXTRACTION EXTRACAPSULAR W/ INTRAOCULAR LENS IMPLANTATION Bilateral     COLECTOMY      Partial    COLON SURGERY      colon cancer    COLONOSCOPY N/A 8/5/2016    Procedure: COLONOSCOPY;  Surgeon: Lizett Leonard MD;  Location: [facility]; Service:     EYE SURGERY      HYSTERECTOMY      THROAT SURGERY      TRACHEAL SURGERY      tumor removed benign    TUMOR REMOVAL N/A 2000    trachea     Family History   Problem Relation Age of Onset    Diabetes Sister     Hypertension Sister      Immunization History   Administered Date(s) Administered    Influenza Split High Dose Preservative Free IM 10/09/2012, 11/03/2014, 10/11/2016, 10/13/2017    Influenza TIV (IM) 10/18/2006, 10/10/2007, 10/31/2008, 10/12/2010, 10/10/2011, 12/27/2013    Influenza, high dose seasonal 0 5 mL 10/15/2018    Pneumococcal Conjugate 13-Valent 06/29/2015    Pneumococcal Polysaccharide PPV23 06/24/2013    Tdap 04/05/2007     Current Outpatient Prescriptions   Medication Sig Dispense Refill    alendronate (FOSAMAX) 70 mg tablet TAKE ONE TABLET BY MOUTH ONCE EVERY WEEK (Patient taking differently: TAKE ONE TABLET BY MOUTH ONCE EVERY WEEK ON SATURDAYS) 12 tablet 3    ezetimibe-simvastatin (VYTORIN) 10-20 mg per tablet TAKE ONE TABLET BY MOUTH EVERY DAY AT BEDTIME 90 tablet 3    guaiFENesin (MUCINEX) 600 mg 12 hr tablet Take 1 tablet (600 mg total) by mouth every 12 (twelve) hours 20 tablet 0    hydrochlorothiazide (HYDRODIURIL) 25 mg tablet Take 1 tablet (25 mg total) by mouth daily 90 tablet 3    lisinopril (ZESTRIL) 5 mg tablet Take 1 tablet (5 mg total) by mouth daily 30 tablet 0    metoprolol tartrate (LOPRESSOR) 25 mg tablet Take 1 tablet (25 mg total) by mouth 2 (two) times a day 60 tablet 0    rivaroxaban (XARELTO) 20 mg tablet Take 1 tablet (20 mg total) by mouth daily with dinner 30 tablet 0     No current facility-administered medications for this visit  Allergies: Azithromycin;  Niacin and related; and Keflex [cephalexin]    Objective:  Vitals:    02/05/19 1235   BP: 118/62   Pulse: 57   Resp: 12   Temp: (!) 97 4 °F (36 3 °C)   TempSrc: Tympanic   SpO2: 97%   Weight: 58 5 kg (129 lb)   Height: 5' 2" (1 575 m)   Oxygen Therapy  SpO2: 97 %    Wt Readings from Last 3 Encounters:   02/05/19 58 5 kg (129 lb)   01/23/19 59 4 kg (131 lb)   01/21/19 59 8 kg (131 lb 13 4 oz)     Body mass index is 23 59 kg/m²  Physical Exam   Constitutional: She is oriented to person, place, and time  She appears well-developed and well-nourished  HENT:   Head: Normocephalic and atraumatic  Eyes: Pupils are equal, round, and reactive to light  Conjunctivae are normal    Neck: Normal range of motion  Neck supple  Cardiovascular: Normal rate, regular rhythm and normal heart sounds  Pulmonary/Chest: Effort normal and breath sounds normal  No respiratory distress  She has no wheezes  She has no rales  She exhibits no tenderness  Abdominal: Soft  Bowel sounds are normal    Musculoskeletal: Normal range of motion  She exhibits no edema  Neurological: She is alert and oriented to person, place, and time  Skin: Skin is warm and dry  Psychiatric: She has a normal mood and affect         Lab Review:   Appointment on 01/25/2019   Component Date Value    WBC 01/25/2019 9 08     RBC 01/25/2019 4 68     Hemoglobin 01/25/2019 14 4     Hematocrit 01/25/2019 45 8     MCV 01/25/2019 98     MCH 01/25/2019 30 8     MCHC 01/25/2019 31 4     RDW 01/25/2019 13 2     Platelets 63/65/0449 627*    MPV 01/25/2019 8 9     Sodium 01/25/2019 133*    Potassium 01/25/2019 4 2     Chloride 01/25/2019 97*    CO2 01/25/2019 31     ANION GAP 01/25/2019 5     BUN 01/25/2019 13     Creatinine 01/25/2019 0 62     Glucose 01/25/2019 97     Calcium 01/25/2019 8 7     AST 01/25/2019 45     ALT 01/25/2019 37     Alkaline Phosphatase 01/25/2019 79     Total Protein 01/25/2019 7 4     Albumin 01/25/2019 2 4*    Total Bilirubin 01/25/2019 1 10*  eGFR 01/25/2019 82    Admission on 01/18/2019, Discharged on 01/21/2019   Component Date Value    Sodium 01/18/2019 133*    Potassium 01/18/2019 3 5     Chloride 01/18/2019 97*    CO2 01/18/2019 28     ANION GAP 01/18/2019 8     BUN 01/18/2019 16     Creatinine 01/18/2019 1 04     Glucose 01/18/2019 148*    Calcium 01/18/2019 8 1*    AST 01/18/2019 50*    ALT 01/18/2019 41     Alkaline Phosphatase 01/18/2019 115     Total Protein 01/18/2019 6 8     Albumin 01/18/2019 1 9*    Total Bilirubin 01/18/2019 0 90     eGFR 01/18/2019 49     WBC 01/18/2019 21 32*    RBC 01/18/2019 4 68     Hemoglobin 01/18/2019 14 6     Hematocrit 01/18/2019 43 9     MCV 01/18/2019 94     MCH 01/18/2019 31 2     MCHC 01/18/2019 33 3     RDW 01/18/2019 13 0     MPV 01/18/2019 9 7     Platelets 08/51/9660 469*    nRBC 01/18/2019 0     Troponin I 01/18/2019 <0 02     NT-proBNP 01/18/2019 1957*    Color, UA 01/18/2019 Light Yellow     Clarity, UA 01/18/2019 Clear     Specific Gravity, UA 01/18/2019 <=1 005     pH, UA 01/18/2019 7 5     Leukocytes, UA 01/18/2019 Negative     Nitrite, UA 01/18/2019 Negative     Protein, UA 01/18/2019 Negative     Glucose, UA 01/18/2019 Negative     Ketones, UA 01/18/2019 Negative     Urobilinogen, UA 01/18/2019 0 2     Bilirubin, UA 01/18/2019 Negative     Blood, UA 01/18/2019 Trace-Intact*    Segmented % 01/18/2019 81*    Bands % 01/18/2019 13*    Lymphocytes % 01/18/2019 2*    Monocytes % 01/18/2019 4     Eosinophils, % 01/18/2019 0     Basophils % 01/18/2019 0     Absolute Neutrophils 01/18/2019 20 04*    Lymphocytes Absolute 01/18/2019 0 43*    Monocytes Absolute 01/18/2019 0 85     Eosinophils Absolute 01/18/2019 0 00     Basophils Absolute 01/18/2019 0 00     Total Counted 01/18/2019 100     Toxic Vacuolated Neutrop* 01/18/2019 Present     RBC Morphology 01/18/2019 Normal     Platelet Estimate 19/30/5308 Increased*    Large Platelet 34/02/1969 Present     Blood Culture 01/18/2019 No Growth After 5 Days   Blood Culture 01/18/2019 No Growth After 5 Days       LACTIC ACID 01/18/2019 1 7     RBC, UA 01/18/2019 0-1*    WBC, UA 01/18/2019 None Seen     Epithelial Cells 01/18/2019 Occasional     Bacteria, UA 01/18/2019 Occasional     MRSA Culture Only 01/18/2019 No Methicillin Resistant Staphlyococcus aureus (MRSA) isolated     Troponin I 01/19/2019 <0 02     Troponin I 01/18/2019 <0 02     Strep pneumoniae antigen* 01/19/2019 Negative     Legionella Urinary Antig* 01/19/2019 Negative     TSH 3RD GENERATON 01/19/2019 0 734     WBC 01/19/2019 16 19*    RBC 01/19/2019 4 52     Hemoglobin 01/19/2019 14 2     Hematocrit 01/19/2019 43 5     MCV 01/19/2019 96     MCH 01/19/2019 31 4     MCHC 01/19/2019 32 6     RDW 01/19/2019 13 2     MPV 01/19/2019 9 5     Platelets 21/43/7433 482*    nRBC 01/19/2019 0     Neutrophils Relative 01/19/2019 89*    Immat GRANS % 01/19/2019 2     Lymphocytes Relative 01/19/2019 5*    Monocytes Relative 01/19/2019 4     Eosinophils Relative 01/19/2019 0     Basophils Relative 01/19/2019 0     Neutrophils Absolute 01/19/2019 14 48*    Immature Grans Absolute 01/19/2019 0 26*    Lymphocytes Absolute 01/19/2019 0 79     Monocytes Absolute 01/19/2019 0 61     Eosinophils Absolute 01/19/2019 0 01     Basophils Absolute 01/19/2019 0 04     Sodium 01/19/2019 137     Potassium 01/19/2019 3 3*    Chloride 01/19/2019 99*    CO2 01/19/2019 30     ANION GAP 01/19/2019 8     BUN 01/19/2019 17     Creatinine 01/19/2019 0 76     Glucose 01/19/2019 119     Calcium 01/19/2019 8 3     eGFR 01/19/2019 72     Magnesium 01/19/2019 2 3     Procalcitonin 01/19/2019 0 06     Procalcitonin 01/20/2019 0 05     WBC 01/20/2019 9 97     RBC 01/20/2019 4 21     Hemoglobin 01/20/2019 13 0     Hematocrit 01/20/2019 40 9     MCV 01/20/2019 97     MCH 01/20/2019 30 9     MCHC 01/20/2019 31 8     RDW 01/20/2019 13 5  Platelets 07/28/9998 447*    MPV 01/20/2019 9 4     Sodium 01/20/2019 136     Potassium 01/20/2019 4 0     Chloride 01/20/2019 101     CO2 01/20/2019 31     ANION GAP 01/20/2019 4     BUN 01/20/2019 16     Creatinine 01/20/2019 0 63     Glucose 01/20/2019 100     Calcium 01/20/2019 8 3     eGFR 01/20/2019 82     Magnesium 01/20/2019 2 1     WBC 01/21/2019 8 38     RBC 01/21/2019 4 56     Hemoglobin 01/21/2019 14 1     Hematocrit 01/21/2019 45 1     MCV 01/21/2019 99*    MCH 01/21/2019 30 9     MCHC 01/21/2019 31 3*    RDW 01/21/2019 13 6     Platelets 32/82/6418 494*    MPV 01/21/2019 9 4     Sodium 01/21/2019 136     Potassium 01/21/2019 4 0     Chloride 01/21/2019 100     CO2 01/21/2019 33*    ANION GAP 01/21/2019 3*    BUN 01/21/2019 16     Creatinine 01/21/2019 0 68     Glucose 01/21/2019 92     Calcium 01/21/2019 8 5     eGFR 01/21/2019 80     Magnesium 01/21/2019 2 0     Ventricular Rate 01/18/2019 99     Atrial Rate 01/18/2019 340     QRSD Interval 01/18/2019 80     QT Interval 01/18/2019 344     QTC Interval 01/18/2019 441     QRS Axis 01/18/2019 44     T Wave Axis 01/18/2019 68        Diagnostics:  No orders to display     I have personally reviewed pertinent reports  and I have personally reviewed pertinent films in PACS  CT of chest performed on date as listed below  We reviewed the images together in the patient room  Office Spirometry Results:    Spirometry results     Pre bronchodilator:  FEV1/FVC was 58% of predicted  Forced vital capacity was 1 48 L or 69% of predicted and FEV1 was 0 86 L or 55% of predicted     ESS:    Xr Chest Portable    Result Date: 1/21/2019  Narrative: CHEST INDICATION:   pneumonia  COMPARISON:  1/18/2018; CT chest 1/19/2018 EXAM PERFORMED/VIEWS:  XR CHEST PORTABLE Images: 1 FINDINGS: Cardiomediastinal silhouette appears unremarkable   Patchy right upper lobe infiltrates stable as is a small pleural effusion and compressive atelectasis at the lung bases  Left lung clear  No pneumothorax  Osseous structures appear within normal limits for patient age  Impression: 1  Stable patchy right upper lobe infiltrate  Follow-up in 6-8 weeks recommended  2   Stable small right pleural effusion and basilar atelectasis  Workstation performed: [id number]     Xr Chest 2 Views    Result Date: 1/18/2019  Narrative: CHEST INDICATION:   afib  COMPARISON:  10/11/2016  EXAM PERFORMED/VIEWS:  XR CHEST PA & LATERAL FINDINGS:  Monitoring leads and clips project over the chest  Cardiomediastinal silhouette appears unremarkable  There is an ill-defined nodular opacity at the right apex measuring 1 8 cm  Small right pleural effusion  Osseous structures appear within normal limits for patient age  Impression: Ill-defined 1 8 cm right apical nodule  Recommend follow-up CT scan  Small right pleural effusion  Workstation performed: [id number]     Ct Chest Wo Contrast    Result Date: 1/19/2019  Narrative: CT CHEST WITHOUT IV CONTRAST INDICATION:   Cough, persistent; Pneumonia complicated / unresolved Shortness of breath  COMPARISON:  CT chest of 9/1/2016, chest x-ray 1/18/2019 TECHNIQUE: CT examination of the chest was performed without intravenous contrast   Axial, sagittal, and coronal 2D reformatted images were created from the source data and submitted for interpretation  Radiation dose length product (DLP) for this visit:  303 56 mGy-cm   This examination, like all CT scans performed in the [facility], was performed utilizing techniques to minimize radiation dose exposure, including the use of iterative  reconstruction and automated exposure control  FINDINGS: LUNGS:  Patchy consolidation noted in the right upper lobe  This likely accounts for the abnormality seen on chest x-ray  Additional scattered patchy infiltrates noted bilaterally involving all lobes more extensive on the right than on the left    Associated variable bronchial wall thickening  PLEURA:  Small right pleural effusion  HEART/GREAT VESSELS:  Moderate coronary artery calcifications  MEDIASTINUM AND VLADIMIR:  A large right pericardiac lymph node measures 2 0 x 1 6 cm image 32 series 2  Numerous calcified mediastinal lymph nodes noted  CHEST WALL AND LOWER NECK:   Unremarkable  VISUALIZED STRUCTURES IN THE UPPER ABDOMEN:  Partially visualized cyst in the left upper quadrant likely renal   This measures up to 9 3 cm in size  OSSEOUS STRUCTURES:  No acute fracture or destructive osseous lesion  Impression: 1  Scattered patchy consolidation infiltrates bilaterally compatible with multifocal pneumonia  Recommend follow-up to resolution  Follow-up chest CT recommended in 6-8 weeks  2  Enlarged right pericardiac lymph node, question reactive  This can be reassessed on follow-up  3   New small right pleural effusion  4   Partially visualized large cyst in the left upper quadrant likely of renal origin  This could be followed up with renal ultrasound  The study was marked in EPIC for significant notification  Workstation performed: [id number]     Us Kidney And Bladder    Result Date: 1/29/2019  Narrative: RENAL ULTRASOUND INDICATION:   N28 1: Cyst of kidney, acquired  COMPARISON: CT abdomen and pelvis 1/19/2019 TECHNIQUE:   Ultrasound of the retroperitoneum was performed with a curvilinear transducer utilizing volumetric sweeps and still imaging techniques  FINDINGS: KIDNEYS: Symmetric and normal size  Right kidney:  13 6 x 5 4 cm  Left kidney:  14 6 x 4 6 cm  Right kidney Normal echogenicity and contour  No suspicious masses detected  Several cortical cysts identified the largest exophytic from the lower pole which is simple and anechoic measuring 8 0 x 5 1 x 7 7 cm  This was not included on the recent CT of the chest  No hydronephrosis  No shadowing calculi  No perinephric fluid collections  Left kidney Normal echogenicity and contour  No suspicious masses detected  Multiple simple cysts are also identified the largest exophytic from the upper pole measuring 10 4 x 8 1 x 8 2 cm which was described on the recent CT  No hydronephrosis  No shadowing calculi  No perinephric fluid collections  URETERS: Nonvisualized  BLADDER: Normally distended  No mass lesions  A thick septation at the posterior bladder base is avascular and may be sequela of chronic infection  Bilateral ureteral jets detected  Multiple gallstones identified without evidence for acute cholecystitis  Impression: 1  Simple bilateral renal cysts with no hydronephrosis or perinephric collections  2   Avascular thickened septation at the posterior bladder base, perhaps sequela of remote infection  Correlation with clinical history recommended  3   Cholelithiasis   Workstation performed: [id number]

## 2019-02-05 NOTE — ASSESSMENT & PLAN NOTE
-was presumptive parapneumonic in nature  -was not causing any previous respiratory compromise  -will monitor with follow-up CT for evidence of resolution

## 2019-02-06 ENCOUNTER — PATIENT OUTREACH (OUTPATIENT)
Dept: CASE MANAGEMENT | Facility: OTHER | Age: 84
End: 2019-02-06

## 2019-02-07 ENCOUNTER — TELEPHONE (OUTPATIENT)
Dept: PULMONOLOGY | Facility: MEDICAL CENTER | Age: 84
End: 2019-02-07

## 2019-02-07 ENCOUNTER — OFFICE VISIT (OUTPATIENT)
Dept: CARDIOLOGY CLINIC | Facility: CLINIC | Age: 84
End: 2019-02-07
Payer: MEDICARE

## 2019-02-07 VITALS
OXYGEN SATURATION: 93 % | SYSTOLIC BLOOD PRESSURE: 110 MMHG | DIASTOLIC BLOOD PRESSURE: 62 MMHG | HEIGHT: 62 IN | BODY MASS INDEX: 23.92 KG/M2 | WEIGHT: 130 LBS | HEART RATE: 68 BPM

## 2019-02-07 DIAGNOSIS — I48.0 PAROXYSMAL ATRIAL FIBRILLATION (HCC): Primary | ICD-10-CM

## 2019-02-07 DIAGNOSIS — I10 BENIGN ESSENTIAL HYPERTENSION: ICD-10-CM

## 2019-02-07 DIAGNOSIS — E78.49 OTHER HYPERLIPIDEMIA: ICD-10-CM

## 2019-02-07 PROCEDURE — 93000 ELECTROCARDIOGRAM COMPLETE: CPT | Performed by: INTERNAL MEDICINE

## 2019-02-07 PROCEDURE — 99214 OFFICE O/P EST MOD 30 MIN: CPT | Performed by: INTERNAL MEDICINE

## 2019-02-07 NOTE — PROGRESS NOTES
Cardiology Follow Up    Homero Tran  1933  [id number]      Interval History: Homero Began is here for follow up of recent hospitalization  She developed shortness of breath secondary to pneumonia  Complicating pneumonia was atrial fibrillation and pleural effusion  She converted to sinus rhythm spontaneously and was started on Xarelto afterwards  Echocardiogram showed normal ejection fraction with diastolic dysfunction  She was treated with IV Lasix  Since hospital discharge, she has been feeling well without any shortness of breath  She has a nonproductive cough which has been present for the past several months  She was seen by Pulmonary who ordered a CT scan and started her on Spiriva  She has been unable to use Spiriva because of difficulty with inhaler  She denies any lower extremity edema, orthopnea or paroxysmal nocturnal dyspnea  She denies any chest pain  Patient has no previous cardiac history  The following portions of the patient's history were reviewed and updated as appropriate: She  has a past medical history of 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[facility] [address] ); Community acquired pneumonia; Hyperlipidemia; and Hypertension  She  has a past surgical history that includes Hysterectomy; Tumor removal (N/A, 2000); Colon surgery; Bowel resection; Cataract extraction, extracapsular w/ intraocular lens implant (Bilateral); Tracheal surgery; Appendectomy; Colonoscopy (N/A, 8/5/2016); Colectomy; Throat surgery; and Eye surgery  Her family history includes Diabetes in her sister; Hypertension in her sister  She  reports that she has never smoked  She has never used smokeless tobacco  She reports that she does not drink alcohol or use drugs    Current Outpatient Prescriptions   Medication Sig Dispense Refill    alendronate (FOSAMAX) 70 mg tablet TAKE ONE TABLET BY MOUTH ONCE EVERY WEEK (Patient taking differently: TAKE ONE TABLET BY MOUTH ONCE EVERY WEEK ON SATURDAYS) 12 tablet 3    ezetimibe-simvastatin (VYTORIN) 10-20 mg per tablet TAKE ONE TABLET BY MOUTH EVERY DAY AT BEDTIME 90 tablet 3    hydrochlorothiazide (HYDRODIURIL) 25 mg tablet Take 1 tablet (25 mg total) by mouth daily 90 tablet 3    metoprolol tartrate (LOPRESSOR) 25 mg tablet Take 1 tablet (25 mg total) by mouth 2 (two) times a day 60 tablet 0    rivaroxaban (XARELTO) 20 mg tablet Take 1 tablet (20 mg total) by mouth daily with dinner 30 tablet 0    Tiotropium Bromide Monohydrate (SPIRIVA HANDIHALER IN) Inhale      guaiFENesin (MUCINEX) 600 mg 12 hr tablet Take 1 tablet (600 mg total) by mouth every 12 (twelve) hours (Patient not taking: Reported on 2/7/2019 ) 20 tablet 0     No current facility-administered medications for this visit  She is allergic to azithromycin; niacin and related; and keflex [cephalexin]         Review of Systems:  Review of Systems   Constitutional: Negative for chills, fatigue and fever  HENT: Negative for congestion, nosebleeds and postnasal drip  Respiratory: Positive for cough  Negative for chest tightness and shortness of breath  Cardiovascular: Negative for chest pain, palpitations and leg swelling  Gastrointestinal: Negative for abdominal distention, abdominal pain, diarrhea, nausea and vomiting  Endocrine: Negative for polydipsia, polyphagia and polyuria  Musculoskeletal: Negative for gait problem and myalgias  Skin: Negative for color change, pallor and rash  Allergic/Immunologic: Negative for environmental allergies, food allergies and immunocompromised state  Neurological: Negative for dizziness, seizures, syncope and light-headedness  Hematological: Negative for adenopathy  Does not bruise/bleed easily  Psychiatric/Behavioral: Negative for dysphoric mood  The patient is not nervous/anxious          Physical Exam:  /62 (BP Location: Right arm, Patient Position: Sitting, Cuff Size: Standard)   Pulse 68   Ht 5' 2" (1 575 m)   Wt 59 kg (130 lb)   SpO2 93%   BMI 23 78 kg/m²     Physical Exam   Constitutional: She is oriented to person, place, and time  She appears well-developed  No distress  HENT:   Head: Normocephalic and atraumatic  Eyes: Pupils are equal, round, and reactive to light  Conjunctivae and EOM are normal    Neck: Neck supple  No JVD present  No thyromegaly present  Cardiovascular: Normal rate and regular rhythm  Exam reveals no gallop and no friction rub  Murmur heard  Pulmonary/Chest: Effort normal and breath sounds normal    Abdominal: Soft  She exhibits no distension  There is no tenderness  Musculoskeletal: She exhibits no edema  Neurological: She is alert and oriented to person, place, and time  No cranial nerve deficit  Skin: Skin is warm and dry  No rash noted  She is not diaphoretic  No erythema  Psychiatric: She has a normal mood and affect  Her behavior is normal  Judgment and thought content normal        Cardiographics  ECG:  Normal sinus rhythm with sinus arrhythmia    Labs:  Lab Results   Component Value Date     12/27/2013    K 4 2 01/25/2019    K 3 9 12/27/2013    CL 97 (L) 01/25/2019     12/27/2013    CO2 31 01/25/2019    CO2 33 12/27/2013    BUN 13 01/25/2019    BUN 15 12/27/2013    CREATININE 0 62 01/25/2019    CREATININE 0 7 12/27/2013    CALCIUM 8 7 01/25/2019    CALCIUM 8 9 12/27/2013     Lab Results   Component Value Date    WBC 9 08 01/25/2019    HGB 14 4 01/25/2019    HCT 45 8 01/25/2019    MCV 98 01/25/2019     (H) 01/25/2019     Lab Results   Component Value Date    CHOL 128 12/27/2013    TRIG 129 10/15/2018    TRIG 155 12/27/2013    HDL 41 10/15/2018    HDL 39 12/27/2013     Imaging: Xr Chest Portable    Result Date: 1/21/2019  Narrative: CHEST INDICATION:   pneumonia  COMPARISON:  1/18/2018; CT chest 1/19/2018 EXAM PERFORMED/VIEWS:  XR CHEST PORTABLE Images: 1 FINDINGS: Cardiomediastinal silhouette appears unremarkable   Patchy right upper lobe infiltrates stable as is a small pleural effusion and compressive atelectasis at the lung bases  Left lung clear  No pneumothorax  Osseous structures appear within normal limits for patient age  Impression: 1  Stable patchy right upper lobe infiltrate  Follow-up in 6-8 weeks recommended  2   Stable small right pleural effusion and basilar atelectasis  Workstation performed: [id number]     Xr Chest 2 Views    Result Date: 1/18/2019  Narrative: CHEST INDICATION:   afib  COMPARISON:  10/11/2016  EXAM PERFORMED/VIEWS:  XR CHEST PA & LATERAL FINDINGS:  Monitoring leads and clips project over the chest  Cardiomediastinal silhouette appears unremarkable  There is an ill-defined nodular opacity at the right apex measuring 1 8 cm  Small right pleural effusion  Osseous structures appear within normal limits for patient age  Impression: Ill-defined 1 8 cm right apical nodule  Recommend follow-up CT scan  Small right pleural effusion  Workstation performed: [id number]     Ct Chest Wo Contrast    Result Date: 1/19/2019  Narrative: CT CHEST WITHOUT IV CONTRAST INDICATION:   Cough, persistent; Pneumonia complicated / unresolved Shortness of breath  COMPARISON:  CT chest of 9/1/2016, chest x-ray 1/18/2019 TECHNIQUE: CT examination of the chest was performed without intravenous contrast   Axial, sagittal, and coronal 2D reformatted images were created from the source data and submitted for interpretation  Radiation dose length product (DLP) for this visit:  303 56 mGy-cm   This examination, like all CT scans performed in the [facility], was performed utilizing techniques to minimize radiation dose exposure, including the use of iterative  reconstruction and automated exposure control  FINDINGS: LUNGS:  Patchy consolidation noted in the right upper lobe  This likely accounts for the abnormality seen on chest x-ray    Additional scattered patchy infiltrates noted bilaterally involving all lobes more extensive on the right than on the left  Associated variable bronchial wall thickening  PLEURA:  Small right pleural effusion  HEART/GREAT VESSELS:  Moderate coronary artery calcifications  MEDIASTINUM AND VLADIMIR:  A large right pericardiac lymph node measures 2 0 x 1 6 cm image 32 series 2  Numerous calcified mediastinal lymph nodes noted  CHEST WALL AND LOWER NECK:   Unremarkable  VISUALIZED STRUCTURES IN THE UPPER ABDOMEN:  Partially visualized cyst in the left upper quadrant likely renal   This measures up to 9 3 cm in size  OSSEOUS STRUCTURES:  No acute fracture or destructive osseous lesion  Impression: 1  Scattered patchy consolidation infiltrates bilaterally compatible with multifocal pneumonia  Recommend follow-up to resolution  Follow-up chest CT recommended in 6-8 weeks  2  Enlarged right pericardiac lymph node, question reactive  This can be reassessed on follow-up  3   New small right pleural effusion  4   Partially visualized large cyst in the left upper quadrant likely of renal origin  This could be followed up with renal ultrasound  The study was marked in EPIC for significant notification  Workstation performed: [id number]     Us Kidney And Bladder    Result Date: 1/29/2019  Narrative: RENAL ULTRASOUND INDICATION:   N28 1: Cyst of kidney, acquired  COMPARISON: CT abdomen and pelvis 1/19/2019 TECHNIQUE:   Ultrasound of the retroperitoneum was performed with a curvilinear transducer utilizing volumetric sweeps and still imaging techniques  FINDINGS: KIDNEYS: Symmetric and normal size  Right kidney:  13 6 x 5 4 cm  Left kidney:  14 6 x 4 6 cm  Right kidney Normal echogenicity and contour  No suspicious masses detected  Several cortical cysts identified the largest exophytic from the lower pole which is simple and anechoic measuring 8 0 x 5 1 x 7 7 cm  This was not included on the recent CT of the chest  No hydronephrosis  No shadowing calculi  No perinephric fluid collections   Left kidney Normal echogenicity and contour  No suspicious masses detected  Multiple simple cysts are also identified the largest exophytic from the upper pole measuring 10 4 x 8 1 x 8 2 cm which was described on the recent CT  No hydronephrosis  No shadowing calculi  No perinephric fluid collections  URETERS: Nonvisualized  BLADDER: Normally distended  No mass lesions  A thick septation at the posterior bladder base is avascular and may be sequela of chronic infection  Bilateral ureteral jets detected  Multiple gallstones identified without evidence for acute cholecystitis  Impression: 1  Simple bilateral renal cysts with no hydronephrosis or perinephric collections  2   Avascular thickened septation at the posterior bladder base, perhaps sequela of remote infection  Correlation with clinical history recommended  3   Cholelithiasis  Workstation performed: [id number]       Discussion/Summary:  1  Paroxysmal atrial fibrillation (HCC)    2  Benign essential hypertension    3  Other hyperlipidemia      - she remains in sinus rhythm  She is on Xarelto to prevent CVA  - Discussed risks and benefits of long term anticoagulation  Continue Xarelto for now  Next visit, will obtain holter monitor and possibly discontinue at that time  - discontinue lisinopril as it may be the source of her nonproductive cough  - Based on ACC/AHA 2017 guidelines, the target BP in a person >72years of age and no other significant comorbid conditions is <130/80 in office measurements and < 125/80 ambulatory  Patient is at goal now  Continue to monitor regularly

## 2019-02-07 NOTE — PROGRESS NOTES
Patient is current for SN [facility]  She was discharged from PT services on 1/30/19

## 2019-02-11 ENCOUNTER — TELEPHONE (OUTPATIENT)
Dept: INPATIENT UNIT | Facility: HOSPITAL | Age: 84
End: 2019-02-11

## 2019-02-11 NOTE — TELEPHONE ENCOUNTER
Called patient for 3 week post discharge follow up  Pt saw Dr Meghan Gustafson on 2/7  Pt states she has PFT and CT scan scheduled for 3/5/19  States she recently stopped her Lisinopril per MD order due to low BP  States she is doing well on only one BP med  Pt also states her MD stopped her inhaler pending results of PFT  Pt purchased scale and is now taking daily weights  Current weight 56 8kg

## 2019-02-13 ENCOUNTER — PATIENT OUTREACH (OUTPATIENT)
Dept: CASE MANAGEMENT | Facility: OTHER | Age: 84
End: 2019-02-13

## 2019-02-18 ENCOUNTER — TELEPHONE (OUTPATIENT)
Dept: CARDIOLOGY CLINIC | Facility: CLINIC | Age: 84
End: 2019-02-18

## 2019-02-18 DIAGNOSIS — I48.91 ATRIAL FIBRILLATION WITH RAPID VENTRICULAR RESPONSE (HCC): ICD-10-CM

## 2019-02-18 NOTE — TELEPHONE ENCOUNTER
Pharm called reg: National Kidney Disease Education Program recommendations are as follows:  GFR calculation is accurate only with a steady state creatinine  Chronic Kidney disease less than 60 ml/min/1 73 sq  meters  Kidney failure less than 15 ml/min/1 73 sq  Meters  Please confirm that you still want her on xarelto 20mg(see lab results)  Call pharm [facility] [place] to confirm dose before they refill meds

## 2019-02-19 ENCOUNTER — TELEPHONE (OUTPATIENT)
Dept: INPATIENT UNIT | Facility: HOSPITAL | Age: 84
End: 2019-02-19

## 2019-02-21 ENCOUNTER — TELEPHONE (OUTPATIENT)
Dept: FAMILY MEDICINE CLINIC | Facility: CLINIC | Age: 84
End: 2019-02-21

## 2019-02-21 DIAGNOSIS — I48.91 ATRIAL FIBRILLATION WITH RAPID VENTRICULAR RESPONSE (HCC): ICD-10-CM

## 2019-02-21 NOTE — TELEPHONE ENCOUNTER
PLEASE CALL PATIENT ASAP  SHE SAID SHE HAS CALLED 3 DAYS IN A ROW FOR HER MEDICATION WHICH SHE NOW RAN OUT OF AND NEEDS SOMEONE TO FOLLOW UP ON THIS

## 2019-02-21 NOTE — TELEPHONE ENCOUNTER
Dr Sen Farah,     Patient said that when she came home from the hospital, they gave her a med list and she is having trouble getting the meds because of no refills  She can't even get them from her heart doctor who prescribed them and she doesn't know who to go too or if she even needs to continue taking them? She has an appointment scheduled with you as a hospital fu

## 2019-02-21 NOTE — TELEPHONE ENCOUNTER
Spoke with patient  Took last dose of Metoprolol Tart  25 mg bid today  Has the Xarelto 20 mg  Task sent to Dr Edyta Mays

## 2019-02-22 ENCOUNTER — PATIENT OUTREACH (OUTPATIENT)
Dept: CASE MANAGEMENT | Facility: OTHER | Age: 84
End: 2019-02-22

## 2019-02-22 DIAGNOSIS — I48.91 ATRIAL FIBRILLATION WITH RAPID VENTRICULAR RESPONSE (HCC): ICD-10-CM

## 2019-02-22 NOTE — TELEPHONE ENCOUNTER
Can you please let pharmacy know that her renal function is normal and correct dose is 20 mg  Thanks

## 2019-02-22 NOTE — PROGRESS NOTES
Patient states she is doing well  She was able to get a talking scale & weighs herself every morning  Reviewed w/teach back, when to notify the doctor of a weight gain or other S&S of CHF  Patient denies SOB, edema, or pain  C/o occasional nonproductive cough  She states she uses [employer] instead of adding salt, but usually eats [employer]  She tries to read the labels, but due to impaired vision, is not always successful  She is waiting on a refill of her Lopressor & expects that it will be available today  Per her request, will follow up Monday to confirm prescription was filled

## 2019-02-25 ENCOUNTER — PATIENT OUTREACH (OUTPATIENT)
Dept: CASE MANAGEMENT | Facility: OTHER | Age: 84
End: 2019-02-25

## 2019-03-05 ENCOUNTER — HOSPITAL ENCOUNTER (OUTPATIENT)
Dept: PULMONOLOGY | Facility: HOSPITAL | Age: 84
Discharge: HOME | End: 2019-03-05
Payer: MEDICARE

## 2019-03-05 ENCOUNTER — HOSPITAL ENCOUNTER (OUTPATIENT)
Dept: RADIOLOGY | Facility: HOSPITAL | Age: 84
Discharge: HOME | End: 2019-03-05
Payer: MEDICARE

## 2019-03-05 DIAGNOSIS — J44.9 CHRONIC OBSTRUCTIVE PULMONARY DISEASE, UNSPECIFIED COPD TYPE (HCC): ICD-10-CM

## 2019-03-05 DIAGNOSIS — J90 PLEURAL EFFUSION ON RIGHT: ICD-10-CM

## 2019-03-05 DIAGNOSIS — J18.9 MULTIFOCAL PNEUMONIA: ICD-10-CM

## 2019-03-05 PROCEDURE — 94729 DIFFUSING CAPACITY: CPT | Performed by: INTERNAL MEDICINE

## 2019-03-05 PROCEDURE — 94060 EVALUATION OF WHEEZING: CPT

## 2019-03-05 PROCEDURE — 71250 CT THORAX DX C-: CPT

## 2019-03-05 PROCEDURE — 94729 DIFFUSING CAPACITY: CPT

## 2019-03-05 PROCEDURE — 94726 PLETHYSMOGRAPHY LUNG VOLUMES: CPT

## 2019-03-05 PROCEDURE — 94760 N-INVAS EAR/PLS OXIMETRY 1: CPT

## 2019-03-05 PROCEDURE — 94060 EVALUATION OF WHEEZING: CPT | Performed by: INTERNAL MEDICINE

## 2019-03-05 PROCEDURE — 94726 PLETHYSMOGRAPHY LUNG VOLUMES: CPT | Performed by: INTERNAL MEDICINE

## 2019-03-05 RX ORDER — ALBUTEROL SULFATE 2.5 MG/3ML
2.5 SOLUTION RESPIRATORY (INHALATION) ONCE
Status: DISCONTINUED | OUTPATIENT
Start: 2019-03-05 | End: 2019-03-09 | Stop reason: HOSPADM

## 2019-03-08 ENCOUNTER — TELEPHONE (OUTPATIENT)
Dept: UROLOGY | Facility: MEDICAL CENTER | Age: 84
End: 2019-03-08

## 2019-03-08 ENCOUNTER — PATIENT OUTREACH (OUTPATIENT)
Dept: CASE MANAGEMENT | Facility: OTHER | Age: 84
End: 2019-03-08

## 2019-03-08 ENCOUNTER — OFFICE VISIT (OUTPATIENT)
Dept: FAMILY MEDICINE CLINIC | Facility: CLINIC | Age: 84
End: 2019-03-08
Payer: MEDICARE

## 2019-03-08 VITALS
HEIGHT: 62 IN | WEIGHT: 129 LBS | BODY MASS INDEX: 23.74 KG/M2 | DIASTOLIC BLOOD PRESSURE: 70 MMHG | SYSTOLIC BLOOD PRESSURE: 124 MMHG | RESPIRATION RATE: 14 BRPM | HEART RATE: 60 BPM | TEMPERATURE: 97.9 F

## 2019-03-08 DIAGNOSIS — N28.1 KIDNEY CYSTS: Primary | ICD-10-CM

## 2019-03-08 DIAGNOSIS — E78.5 HYPERLIPIDEMIA, UNSPECIFIED HYPERLIPIDEMIA TYPE: ICD-10-CM

## 2019-03-08 DIAGNOSIS — I10 BENIGN ESSENTIAL HYPERTENSION: ICD-10-CM

## 2019-03-08 DIAGNOSIS — J90 PLEURAL EFFUSION ON RIGHT: ICD-10-CM

## 2019-03-08 PROCEDURE — 1036F TOBACCO NON-USER: CPT | Performed by: FAMILY MEDICINE

## 2019-03-08 PROCEDURE — 99214 OFFICE O/P EST MOD 30 MIN: CPT | Performed by: FAMILY MEDICINE

## 2019-03-08 PROCEDURE — 1160F RVW MEDS BY RX/DR IN RCRD: CPT | Performed by: FAMILY MEDICINE

## 2019-03-08 RX ORDER — EZETIMIBE AND SIMVASTATIN 10; 20 MG/1; MG/1
1 TABLET ORAL
Qty: 90 TABLET | Refills: 3 | Status: SHIPPED | OUTPATIENT
Start: 2019-03-08 | End: 2019-07-08 | Stop reason: SDUPTHER

## 2019-03-08 NOTE — PROGRESS NOTES
Chief Complaint   Patient presents with    Follow-up     pneumonia   recent CT lung results      Patient ID: Marily Valdovinos is a 80 y o  female  HPI  Pt is seeing for f/u recent pneumonia/pleural effusion -  Resolved on CT - pt is asymptomatic- has large kidneys cyst present on prior images 3 y ago as well -  Never seeing by urologist  -  Has HTN and HLD -  Stable     The following portions of the patient's history were reviewed and updated as appropriate: allergies, current medications, past family history, past medical history, past social history, past surgical history and problem list     Review of Systems   Constitutional: Negative  Respiratory: Negative  Cardiovascular: Negative  Gastrointestinal: Negative  Genitourinary: Negative  Musculoskeletal: Negative  Skin: Negative  Neurological: Negative  Current Outpatient Medications   Medication Sig Dispense Refill    alendronate (FOSAMAX) 70 mg tablet TAKE ONE TABLET BY MOUTH ONCE EVERY WEEK (Patient taking differently: TAKE ONE TABLET BY MOUTH ONCE EVERY WEEK ON SATURDAYS) 12 tablet 3    ezetimibe-simvastatin (VYTORIN) 10-20 mg per tablet TAKE ONE TABLET BY MOUTH EVERY DAY AT BEDTIME 90 tablet 3    hydrochlorothiazide (HYDRODIURIL) 25 mg tablet Take 1 tablet (25 mg total) by mouth daily 90 tablet 3    metoprolol tartrate (LOPRESSOR) 25 mg tablet Take 1 tablet (25 mg total) by mouth 2 (two) times a day 90 tablet 3    rivaroxaban (XARELTO) 20 mg tablet Take 1 tablet (20 mg total) by mouth daily with dinner 30 tablet 5    guaiFENesin (MUCINEX) 600 mg 12 hr tablet Take 1 tablet (600 mg total) by mouth every 12 (twelve) hours (Patient not taking: Reported on 2/7/2019 ) 20 tablet 0    metoprolol tartrate (LOPRESSOR) 25 mg tablet Take 1 tablet (25 mg total) by mouth 2 (two) times a day 180 tablet 1    Tiotropium Bromide Monohydrate (SPIRIVA HANDIHALER IN) Inhale       No current facility-administered medications for this visit  Objective:    /70 (BP Location: Right arm, Patient Position: Sitting, Cuff Size: Standard)   Pulse 60   Temp 97 9 °F (36 6 °C) (Tympanic)   Resp 14   Ht 5' 2" (1 575 m)   Wt 58 5 kg (129 lb)   BMI 23 59 kg/m²        Physical Exam   Constitutional: She is oriented to person, place, and time  She appears well-developed and well-nourished  No distress  Cardiovascular: Normal rate and regular rhythm  Exam reveals no gallop  Murmur heard  Pulmonary/Chest: Effort normal and breath sounds normal  No respiratory distress  She has no wheezes  She has no rales  She exhibits no tenderness  Abdominal: Soft  There is no tenderness  Musculoskeletal: She exhibits no edema  Neurological: She is alert and oriented to person, place, and time  Skin: Skin is warm  Psychiatric: She has a normal mood and affect  Labs in chart were reviewed  Assessment/Plan:         Diagnoses and all orders for this visit:    Kidney cysts  -     Ambulatory referral to Urology; Future    Benign essential hypertension  Stable   Pleural effusion on right/Pneumonia - resolved     Hyperlipidemia, unspecified hyperlipidemia type  -     ezetimibe-simvastatin (VYTORIN) 10-20 mg per tablet;  Take 1 tablet by mouth daily at bedtime  Stable         rto in 3 m                   Miller Goodwin MD

## 2019-03-08 NOTE — TELEPHONE ENCOUNTER
Complaint/Diagnosis:Kidney Cysts    Insurance:MCR/BC BS    History of Cancer:Colon 2001    Previous urologist:NO    Outside testing/where:[facility]    If yes,what kind:[facility]    Records requested/where:[facility]    Preferred location:[place]

## 2019-03-11 DIAGNOSIS — R91.1 PULMONARY NODULE: Primary | ICD-10-CM

## 2019-03-11 NOTE — PROGRESS NOTES
I reached out to the patient and we discussed the findings on the patient's pulmonary function test and CT scan both performed on 03/05/2019  We discussed the findings of moderate COPD with evidence of air trapping, reduced diffusing capacity  Despite this she is feeling well  She currently is not using her Spiriva Handi haler due to difficulty figuring out how to use it  She denies cough or dyspnea or mucus production  Given the significant findings for moderate COPD we discussed utilizing Spiriva Respimat instead to be on a long-term controller medication  She also has no nebulizer at home  I am ordering a nebulizer, nebulizer supplies, and albuterol for her to have at home  I also discussed this information with her visiting nurse Sima Ambrose states that she will stay on this patient's case likely for approximately 2 more weeks to ensure that she is not having difficulty managing her inhalers  She also reports she will instruct her how to use the nebulizer  We also discussed the patient's CT scan results with the new finding of 7 mm pulmonary nodule in the face of resolving pneumonia  We discussed that she does have some residual atelectasis  We discussed deep breathing exercises to mitigate atelectasis  We discussed the pulmonary nodule will have to be followed up in 6 months,  and if remaining will need to be followed up again in another 12 months  She understands what we discussed  She agrees that she will follow up  Her next follow-up visit is June 19, 2019 with Dr Chiquita Perry

## 2019-03-14 ENCOUNTER — TELEPHONE (OUTPATIENT)
Dept: PULMONOLOGY | Facility: MEDICAL CENTER | Age: 84
End: 2019-03-14

## 2019-03-14 NOTE — TELEPHONE ENCOUNTER
Nebulizer order and supplies were sent, however a script for the nebulizer medication was never sent    Can you write or print the script so we can fax it to [facility]

## 2019-03-15 DIAGNOSIS — J44.9 CHRONIC OBSTRUCTIVE PULMONARY DISEASE, UNSPECIFIED COPD TYPE (HCC): Primary | ICD-10-CM

## 2019-03-15 RX ORDER — ALBUTEROL SULFATE 2.5 MG/3ML
2.5 SOLUTION RESPIRATORY (INHALATION) EVERY 6 HOURS PRN
Qty: 1080 ML | Refills: 0 | Status: SHIPPED | OUTPATIENT
Start: 2019-03-15 | End: 2019-03-26 | Stop reason: SDUPTHER

## 2019-03-19 ENCOUNTER — TELEPHONE (OUTPATIENT)
Dept: PULMONOLOGY | Facility: MEDICAL CENTER | Age: 84
End: 2019-03-19

## 2019-03-19 ENCOUNTER — TELEPHONE (OUTPATIENT)
Dept: CARDIOLOGY CLINIC | Facility: CLINIC | Age: 84
End: 2019-03-19

## 2019-03-19 ENCOUNTER — PATIENT OUTREACH (OUTPATIENT)
Dept: CASE MANAGEMENT | Facility: OTHER | Age: 84
End: 2019-03-19

## 2019-03-19 NOTE — PROGRESS NOTES
Patient states she feels she is doing very well  She no longer needs her cane to ambulate  She denies SOB, edema, & weight gain & reports her weight stays between 125 lbs - 127 lbs  Her visiting nurse encouraged her to get out more so she started going to [employer] & today she went shopping for the first time since her hospitalization  A friend does go with her & assists  Per patient, [facility] is waiting to [facility] pending the receipt of the nebulizer so they can show her how to use it  Call placed to pulmonology per her request, she knows they will call her back directly

## 2019-03-19 NOTE — TELEPHONE ENCOUNTER
Patient sees Dr Johanny Castañeda and pharmacy has a question about her xarelto 20 mg  The question is pertaining to her creatine clearance that flagged in their system   Please call pharmacy [phone]

## 2019-03-26 ENCOUNTER — PATIENT OUTREACH (OUTPATIENT)
Dept: CASE MANAGEMENT | Facility: OTHER | Age: 84
End: 2019-03-26

## 2019-03-26 DIAGNOSIS — J44.9 CHRONIC OBSTRUCTIVE PULMONARY DISEASE, UNSPECIFIED COPD TYPE (HCC): Primary | ICD-10-CM

## 2019-03-26 DIAGNOSIS — J44.9 CHRONIC OBSTRUCTIVE PULMONARY DISEASE, UNSPECIFIED COPD TYPE (HCC): ICD-10-CM

## 2019-03-26 RX ORDER — ALBUTEROL SULFATE 2.5 MG/3ML
2.5 SOLUTION RESPIRATORY (INHALATION) EVERY 6 HOURS PRN
Qty: 120 VIAL | Refills: 3 | Status: SHIPPED | OUTPATIENT
Start: 2019-03-26 | End: 2019-04-25

## 2019-03-26 NOTE — PROGRESS NOTES
Patient has still not received a nebulizer or meds (neb & new inhaler)  Will f/u w/pulmonology  Patient is aware a request for orders was placed again  She denies SOB, chest pain, tightness, or pressure, & wheeze  Occasional cough that was productive x 1 w/trace light colored mucus  She will notify pulmonology or PCP of change in symptoms

## 2019-03-26 NOTE — PROGRESS NOTES
SPOKE WITH TONI THIS MORNING AND SHE WAS FOLLOWING UP WITH PATIENT AND SHE WAS WAITING ON A NEBULIZER AND THEN MEDICATION THROUGH [facility]  NO ORDER HAS BEEN PLACED FOR NEBULIZER, I AM DURING THAT NOW  I WILL HAVE PROVIDER PRINT SCRIPT FOR MEDICATION AND FAX OUT TODAY

## 2019-03-27 ENCOUNTER — PATIENT OUTREACH (OUTPATIENT)
Dept: CASE MANAGEMENT | Facility: OTHER | Age: 84
End: 2019-03-27

## 2019-04-01 ENCOUNTER — PATIENT OUTREACH (OUTPATIENT)
Dept: CASE MANAGEMENT | Facility: OTHER | Age: 84
End: 2019-04-01

## 2019-04-08 ENCOUNTER — PATIENT OUTREACH (OUTPATIENT)
Dept: PULMONOLOGY | Age: 84
End: 2019-04-08

## 2019-04-22 ENCOUNTER — PATIENT OUTREACH (OUTPATIENT)
Dept: CASE MANAGEMENT | Facility: OTHER | Age: 84
End: 2019-04-22

## 2019-05-01 ENCOUNTER — APPOINTMENT (OUTPATIENT)
Dept: LAB | Facility: CLINIC | Age: 84
End: 2019-05-01
Payer: MEDICARE

## 2019-05-01 ENCOUNTER — OFFICE VISIT (OUTPATIENT)
Dept: FAMILY MEDICINE CLINIC | Facility: CLINIC | Age: 84
End: 2019-05-01
Payer: MEDICARE

## 2019-05-01 ENCOUNTER — TELEPHONE (OUTPATIENT)
Dept: CARDIOLOGY CLINIC | Facility: CLINIC | Age: 84
End: 2019-05-01

## 2019-05-01 ENCOUNTER — TRANSCRIBE ORDERS (OUTPATIENT)
Dept: LAB | Facility: CLINIC | Age: 84
End: 2019-05-01

## 2019-05-01 VITALS
TEMPERATURE: 97.2 F | SYSTOLIC BLOOD PRESSURE: 102 MMHG | HEIGHT: 62 IN | HEART RATE: 72 BPM | RESPIRATION RATE: 14 BRPM | BODY MASS INDEX: 23.55 KG/M2 | DIASTOLIC BLOOD PRESSURE: 60 MMHG | WEIGHT: 128 LBS

## 2019-05-01 DIAGNOSIS — K64.9 ACUTE HEMORRHOID: ICD-10-CM

## 2019-05-01 DIAGNOSIS — Z79.01 CURRENT USE OF LONG TERM ANTICOAGULATION: ICD-10-CM

## 2019-05-01 DIAGNOSIS — I48.0 PAROXYSMAL ATRIAL FIBRILLATION (HCC): ICD-10-CM

## 2019-05-01 DIAGNOSIS — K62.5 BRIGHT RED RECTAL BLEEDING: ICD-10-CM

## 2019-05-01 DIAGNOSIS — K62.5 BRIGHT RED RECTAL BLEEDING: Primary | ICD-10-CM

## 2019-05-01 DIAGNOSIS — Z79.899 HIGH RISK MEDICATION USE: ICD-10-CM

## 2019-05-01 LAB
ANION GAP SERPL CALCULATED.3IONS-SCNC: 2 MMOL/L (ref 4–13)
BUN SERPL-MCNC: 18 MG/DL (ref 5–25)
CALCIUM SERPL-MCNC: 8.9 MG/DL (ref 8.3–10.1)
CHLORIDE SERPL-SCNC: 102 MMOL/L (ref 100–108)
CO2 SERPL-SCNC: 33 MMOL/L (ref 21–32)
CREAT SERPL-MCNC: 0.7 MG/DL (ref 0.6–1.3)
ERYTHROCYTE [DISTWIDTH] IN BLOOD BY AUTOMATED COUNT: 12.8 % (ref 11.6–15.1)
GFR SERPL CREATININE-BSD FRML MDRD: 79 ML/MIN/1.73SQ M
GLUCOSE P FAST SERPL-MCNC: 104 MG/DL (ref 65–99)
HCT VFR BLD AUTO: 48.8 % (ref 34.8–46.1)
HGB BLD-MCNC: 15.7 G/DL (ref 11.5–15.4)
MCH RBC QN AUTO: 31.1 PG (ref 26.8–34.3)
MCHC RBC AUTO-ENTMCNC: 32.2 G/DL (ref 31.4–37.4)
MCV RBC AUTO: 97 FL (ref 82–98)
PLATELET # BLD AUTO: 336 THOUSANDS/UL (ref 149–390)
PMV BLD AUTO: 9.9 FL (ref 8.9–12.7)
POTASSIUM SERPL-SCNC: 4.4 MMOL/L (ref 3.5–5.3)
RBC # BLD AUTO: 5.05 MILLION/UL (ref 3.81–5.12)
SL AMB POCT FECES OCC BLD: NEGATIVE
SODIUM SERPL-SCNC: 137 MMOL/L (ref 136–145)
WBC # BLD AUTO: 8.11 THOUSAND/UL (ref 4.31–10.16)

## 2019-05-01 PROCEDURE — 36415 COLL VENOUS BLD VENIPUNCTURE: CPT

## 2019-05-01 PROCEDURE — 82270 OCCULT BLOOD FECES: CPT | Performed by: NURSE PRACTITIONER

## 2019-05-01 PROCEDURE — 80048 BASIC METABOLIC PNL TOTAL CA: CPT

## 2019-05-01 PROCEDURE — 99213 OFFICE O/P EST LOW 20 MIN: CPT | Performed by: NURSE PRACTITIONER

## 2019-05-01 PROCEDURE — 85027 COMPLETE CBC AUTOMATED: CPT

## 2019-05-02 ENCOUNTER — TELEPHONE (OUTPATIENT)
Dept: FAMILY MEDICINE CLINIC | Facility: CLINIC | Age: 84
End: 2019-05-02

## 2019-05-07 ENCOUNTER — OFFICE VISIT (OUTPATIENT)
Dept: UROLOGY | Facility: CLINIC | Age: 84
End: 2019-05-07
Payer: MEDICARE

## 2019-05-07 VITALS
WEIGHT: 130 LBS | BODY MASS INDEX: 23.92 KG/M2 | HEIGHT: 62 IN | DIASTOLIC BLOOD PRESSURE: 80 MMHG | HEART RATE: 64 BPM | SYSTOLIC BLOOD PRESSURE: 100 MMHG

## 2019-05-07 DIAGNOSIS — N28.1 KIDNEY CYSTS: ICD-10-CM

## 2019-05-07 DIAGNOSIS — N28.1 RENAL CYST: Primary | ICD-10-CM

## 2019-05-07 PROCEDURE — 99204 OFFICE O/P NEW MOD 45 MIN: CPT | Performed by: UROLOGY

## 2019-05-20 ENCOUNTER — OFFICE VISIT (OUTPATIENT)
Dept: CARDIOLOGY CLINIC | Facility: CLINIC | Age: 84
End: 2019-05-20
Payer: MEDICARE

## 2019-05-20 VITALS
DIASTOLIC BLOOD PRESSURE: 66 MMHG | OXYGEN SATURATION: 90 % | HEIGHT: 62 IN | HEART RATE: 58 BPM | WEIGHT: 127 LBS | SYSTOLIC BLOOD PRESSURE: 116 MMHG | BODY MASS INDEX: 23.37 KG/M2

## 2019-05-20 DIAGNOSIS — I48.0 PAROXYSMAL ATRIAL FIBRILLATION (HCC): Primary | ICD-10-CM

## 2019-05-20 DIAGNOSIS — E78.49 OTHER HYPERLIPIDEMIA: ICD-10-CM

## 2019-05-20 DIAGNOSIS — I10 BENIGN ESSENTIAL HYPERTENSION: ICD-10-CM

## 2019-05-20 PROCEDURE — 93000 ELECTROCARDIOGRAM COMPLETE: CPT | Performed by: INTERNAL MEDICINE

## 2019-05-20 PROCEDURE — 99214 OFFICE O/P EST MOD 30 MIN: CPT | Performed by: INTERNAL MEDICINE

## 2019-06-10 ENCOUNTER — OFFICE VISIT (OUTPATIENT)
Dept: FAMILY MEDICINE CLINIC | Facility: CLINIC | Age: 84
End: 2019-06-10
Payer: MEDICARE

## 2019-06-10 VITALS
RESPIRATION RATE: 16 BRPM | WEIGHT: 129 LBS | HEIGHT: 62 IN | SYSTOLIC BLOOD PRESSURE: 138 MMHG | BODY MASS INDEX: 23.74 KG/M2 | TEMPERATURE: 97.1 F | DIASTOLIC BLOOD PRESSURE: 78 MMHG | HEART RATE: 60 BPM

## 2019-06-10 DIAGNOSIS — I10 BENIGN ESSENTIAL HYPERTENSION: Primary | ICD-10-CM

## 2019-06-10 DIAGNOSIS — J44.9 CHRONIC OBSTRUCTIVE PULMONARY DISEASE, UNSPECIFIED COPD TYPE (HCC): ICD-10-CM

## 2019-06-10 DIAGNOSIS — I48.0 PAROXYSMAL ATRIAL FIBRILLATION (HCC): ICD-10-CM

## 2019-06-10 DIAGNOSIS — M81.0 POST-MENOPAUSAL OSTEOPOROSIS: ICD-10-CM

## 2019-06-10 PROCEDURE — 99214 OFFICE O/P EST MOD 30 MIN: CPT | Performed by: FAMILY MEDICINE

## 2019-06-19 ENCOUNTER — OFFICE VISIT (OUTPATIENT)
Dept: PULMONOLOGY | Facility: MEDICAL CENTER | Age: 84
End: 2019-06-19
Payer: MEDICARE

## 2019-06-19 VITALS
OXYGEN SATURATION: 94 % | HEART RATE: 71 BPM | HEIGHT: 62 IN | TEMPERATURE: 96.9 F | BODY MASS INDEX: 23.55 KG/M2 | DIASTOLIC BLOOD PRESSURE: 82 MMHG | RESPIRATION RATE: 12 BRPM | WEIGHT: 128 LBS | SYSTOLIC BLOOD PRESSURE: 122 MMHG

## 2019-06-19 DIAGNOSIS — R91.1 LUNG NODULE: Primary | ICD-10-CM

## 2019-06-19 DIAGNOSIS — J43.2 CENTRILOBULAR EMPHYSEMA (HCC): ICD-10-CM

## 2019-06-19 PROCEDURE — 99214 OFFICE O/P EST MOD 30 MIN: CPT | Performed by: INTERNAL MEDICINE

## 2019-07-08 DIAGNOSIS — M81.0 OSTEOPOROSIS, UNSPECIFIED OSTEOPOROSIS TYPE, UNSPECIFIED PATHOLOGICAL FRACTURE PRESENCE: ICD-10-CM

## 2019-07-08 DIAGNOSIS — E78.5 HYPERLIPIDEMIA, UNSPECIFIED HYPERLIPIDEMIA TYPE: ICD-10-CM

## 2019-07-08 DIAGNOSIS — I10 BENIGN ESSENTIAL HYPERTENSION: ICD-10-CM

## 2019-07-08 RX ORDER — EZETIMIBE AND SIMVASTATIN 10; 20 MG/1; MG/1
1 TABLET ORAL
Qty: 90 TABLET | Refills: 3 | Status: SHIPPED | OUTPATIENT
Start: 2019-07-08 | End: 2020-06-12

## 2019-07-08 RX ORDER — ALENDRONATE SODIUM 70 MG/1
TABLET ORAL
Qty: 12 TABLET | Refills: 3 | Status: SHIPPED | OUTPATIENT
Start: 2019-07-08 | End: 2020-05-28

## 2019-07-08 RX ORDER — HYDROCHLOROTHIAZIDE 25 MG/1
TABLET ORAL
Qty: 90 TABLET | Refills: 3 | Status: SHIPPED | OUTPATIENT
Start: 2019-07-08 | End: 2020-06-11

## 2019-08-16 DIAGNOSIS — I48.91 ATRIAL FIBRILLATION WITH RAPID VENTRICULAR RESPONSE (HCC): ICD-10-CM

## 2019-08-19 ENCOUNTER — TELEPHONE (OUTPATIENT)
Dept: CARDIOLOGY CLINIC | Facility: CLINIC | Age: 84
End: 2019-08-19

## 2019-08-19 ENCOUNTER — TELEPHONE (OUTPATIENT)
Dept: FAMILY MEDICINE CLINIC | Facility: CLINIC | Age: 84
End: 2019-08-19

## 2019-08-19 DIAGNOSIS — I48.91 ATRIAL FIBRILLATION WITH RAPID VENTRICULAR RESPONSE (HCC): ICD-10-CM

## 2019-08-20 RX ORDER — RIVAROXABAN 20 MG/1
TABLET, FILM COATED ORAL
Qty: 30 TABLET | Refills: 5 | Status: SHIPPED | OUTPATIENT
Start: 2019-08-20 | End: 2020-02-11

## 2019-09-11 ENCOUNTER — HOSPITAL ENCOUNTER (OUTPATIENT)
Dept: RADIOLOGY | Facility: HOSPITAL | Age: 84
Discharge: HOME | End: 2019-09-11
Payer: MEDICARE

## 2019-09-11 DIAGNOSIS — R91.1 PULMONARY NODULE: ICD-10-CM

## 2019-09-11 PROCEDURE — 71250 CT THORAX DX C-: CPT

## 2019-09-13 ENCOUNTER — TELEPHONE (OUTPATIENT)
Dept: CARDIOLOGY CLINIC | Facility: CLINIC | Age: 84
End: 2019-09-13

## 2019-09-13 ENCOUNTER — OFFICE VISIT (OUTPATIENT)
Dept: CARDIOLOGY CLINIC | Facility: CLINIC | Age: 84
End: 2019-09-13
Payer: MEDICARE

## 2019-09-13 VITALS
OXYGEN SATURATION: 98 % | HEART RATE: 65 BPM | BODY MASS INDEX: 23.74 KG/M2 | HEIGHT: 62 IN | SYSTOLIC BLOOD PRESSURE: 142 MMHG | DIASTOLIC BLOOD PRESSURE: 80 MMHG | WEIGHT: 129 LBS

## 2019-09-13 DIAGNOSIS — E78.49 OTHER HYPERLIPIDEMIA: ICD-10-CM

## 2019-09-13 DIAGNOSIS — I48.0 PAROXYSMAL ATRIAL FIBRILLATION (HCC): Primary | ICD-10-CM

## 2019-09-13 DIAGNOSIS — I10 BENIGN ESSENTIAL HYPERTENSION: ICD-10-CM

## 2019-09-13 PROCEDURE — 1123F ACP DISCUSS/DSCN MKR DOCD: CPT | Performed by: INTERNAL MEDICINE

## 2019-09-13 PROCEDURE — 99214 OFFICE O/P EST MOD 30 MIN: CPT | Performed by: INTERNAL MEDICINE

## 2019-09-13 NOTE — TELEPHONE ENCOUNTER
She had one in the past 6 months    Not sure why they would need another but we can order it if that's the only way they will dispense the medication

## 2019-09-13 NOTE — PROGRESS NOTES
Cardiology Follow Up    Yolanda Piper  1933  [id number]      Interval History: Yolanda Piper is here for follow up of atrial fibrillation  She has been feeling well without any shortness of breath or chest pain  She denies any palpitations  She also denies any lower extremity edema, orthopnea or paroxysmal nocturnal dyspnea  She denies any significant bleeds with Xarelto but was having some rectal bleeding from hemorrhoids  In January 2019, she developed shortness of breath secondary to pneumonia  Complicating pneumonia was atrial fibrillation and pleural effusion  She converted to sinus rhythm spontaneously and was started on Xarelto afterwards  Echocardiogram showed normal ejection fraction with diastolic dysfunction  She was treated with IV Lasix  The following portions of the patient's history were reviewed and updated as appropriate: She  has a past medical history of 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[facility] [address] ), Community acquired pneumonia, Hyperlipidemia, Hypertension, Irregular heart beat, Kidney stone, and Lung abnormality  She  has a past surgical history that includes Hysterectomy; Tumor removal (N/A, 2000); Colon surgery; Bowel resection; Cataract extraction, extracapsular w/ intraocular lens implant (Bilateral); Tracheal surgery; Appendectomy; Colonoscopy (N/A, 8/5/2016); Colectomy; Throat surgery; and Eye surgery  Her family history includes Diabetes in her sister; Hypertension in her mother and sister; Stroke in her mother  She  reports that she has never smoked  She has never used smokeless tobacco  She reports that she does not drink alcohol or use drugs  Current Outpatient Medications   Medication Sig Dispense Refill    alendronate (FOSAMAX) 70 mg tablet TAKE 1 TABLET BY MOUTH ONCE EACH WEEK   12 tablet 3    ezetimibe-simvastatin (VYTORIN) 10-20 mg per tablet Take 1 tablet by mouth daily at bedtime 90 tablet 3    hydrochlorothiazide (HYDRODIURIL) 25 mg tablet TAKE ONE TABLET BY MOUTH EVERY DAY 90 tablet 3    metoprolol tartrate (LOPRESSOR) 25 mg tablet Take 1 tablet (25 mg total) by mouth 2 (two) times a day 90 tablet 3    rivaroxaban (XARELTO) 20 mg tablet Take 1 tablet (20 mg total) by mouth daily with dinner 30 tablet 5    XARELTO 20 MG tablet TAKE ONE TABLET BY MOUTH EVERY DAY WITH DINNER 30 tablet 5     No current facility-administered medications for this visit  She is allergic to azithromycin; niacin and related; and keflex [cephalexin]         Review of Systems:  Review of Systems   Constitutional: Negative for chills, fatigue and fever  HENT: Negative for congestion, nosebleeds and postnasal drip  Respiratory: Negative for cough, chest tightness and shortness of breath  Cardiovascular: Negative for chest pain, palpitations and leg swelling  Gastrointestinal: Positive for blood in stool  Negative for abdominal distention, abdominal pain, diarrhea, nausea and vomiting  Endocrine: Negative for polydipsia, polyphagia and polyuria  Musculoskeletal: Negative for gait problem and myalgias  Skin: Negative for color change, pallor and rash  Allergic/Immunologic: Negative for environmental allergies, food allergies and immunocompromised state  Neurological: Negative for dizziness, seizures, syncope and light-headedness  Hematological: Negative for adenopathy  Does not bruise/bleed easily  Psychiatric/Behavioral: Negative for dysphoric mood  The patient is not nervous/anxious  Physical Exam:  /80 (BP Location: Right arm, Patient Position: Sitting, Cuff Size: Standard)   Pulse 65 Comment: apical  Ht 5' 2" (1 575 m)   Wt 58 5 kg (129 lb)   SpO2 98%   BMI 23 59 kg/m²     Physical Exam   Constitutional: She is oriented to person, place, and time  She appears well-developed  No distress  HENT:   Head: Normocephalic and atraumatic  Eyes: Pupils are equal, round, and reactive to light   Conjunctivae and EOM are normal    Neck: Neck supple  No JVD present  No thyromegaly present  Cardiovascular: Normal rate and regular rhythm  Exam reveals no gallop and no friction rub  Murmur heard  Pulmonary/Chest: Effort normal and breath sounds normal    Abdominal: Soft  She exhibits no distension  There is no tenderness  Musculoskeletal: She exhibits no edema  Neurological: She is alert and oriented to person, place, and time  No cranial nerve deficit  Skin: Skin is warm and dry  No rash noted  She is not diaphoretic  No erythema  Psychiatric: She has a normal mood and affect  Her behavior is normal  Judgment and thought content normal        Cardiographics  ECG:  Normal sinus rhythm  rate of 65 beats per min    Labs:  Lab Results   Component Value Date     12/27/2013    K 4 4 05/01/2019    K 3 9 12/27/2013     05/01/2019     12/27/2013    CO2 33 (H) 05/01/2019    CO2 33 12/27/2013    BUN 18 05/01/2019    BUN 15 12/27/2013    CREATININE 0 70 05/01/2019    CREATININE 0 7 12/27/2013    CALCIUM 8 9 05/01/2019    CALCIUM 8 9 12/27/2013     Lab Results   Component Value Date    WBC 8 11 05/01/2019    HGB 15 7 (H) 05/01/2019    HCT 48 8 (H) 05/01/2019    MCV 97 05/01/2019     05/01/2019     Lab Results   Component Value Date    CHOL 128 12/27/2013    TRIG 129 10/15/2018    TRIG 155 12/27/2013    HDL 41 10/15/2018    HDL 39 12/27/2013     Imaging: Xr Chest Portable    Result Date: 1/21/2019  Narrative: CHEST INDICATION:   pneumonia  COMPARISON:  1/18/2018; CT chest 1/19/2018 EXAM PERFORMED/VIEWS:  XR CHEST PORTABLE Images: 1 FINDINGS: Cardiomediastinal silhouette appears unremarkable  Patchy right upper lobe infiltrates stable as is a small pleural effusion and compressive atelectasis at the lung bases  Left lung clear  No pneumothorax  Osseous structures appear within normal limits for patient age  Impression: 1  Stable patchy right upper lobe infiltrate  Follow-up in 6-8 weeks recommended   2  Stable small right pleural effusion and basilar atelectasis  Workstation performed: [id number]     Xr Chest 2 Views    Result Date: 1/18/2019  Narrative: CHEST INDICATION:   afib  COMPARISON:  10/11/2016  EXAM PERFORMED/VIEWS:  XR CHEST PA & LATERAL FINDINGS:  Monitoring leads and clips project over the chest  Cardiomediastinal silhouette appears unremarkable  There is an ill-defined nodular opacity at the right apex measuring 1 8 cm  Small right pleural effusion  Osseous structures appear within normal limits for patient age  Impression: Ill-defined 1 8 cm right apical nodule  Recommend follow-up CT scan  Small right pleural effusion  Workstation performed: [id number]     Ct Chest Wo Contrast    Result Date: 1/19/2019  Narrative: CT CHEST WITHOUT IV CONTRAST INDICATION:   Cough, persistent; Pneumonia complicated / unresolved Shortness of breath  COMPARISON:  CT chest of 9/1/2016, chest x-ray 1/18/2019 TECHNIQUE: CT examination of the chest was performed without intravenous contrast   Axial, sagittal, and coronal 2D reformatted images were created from the source data and submitted for interpretation  Radiation dose length product (DLP) for this visit:  303 56 mGy-cm   This examination, like all CT scans performed in the [facility], was performed utilizing techniques to minimize radiation dose exposure, including the use of iterative  reconstruction and automated exposure control  FINDINGS: LUNGS:  Patchy consolidation noted in the right upper lobe  This likely accounts for the abnormality seen on chest x-ray  Additional scattered patchy infiltrates noted bilaterally involving all lobes more extensive on the right than on the left  Associated variable bronchial wall thickening  PLEURA:  Small right pleural effusion  HEART/GREAT VESSELS:  Moderate coronary artery calcifications  MEDIASTINUM AND VLADIMIR:  A large right pericardiac lymph node measures 2 0 x 1 6 cm image 32 series 2    Numerous calcified mediastinal lymph nodes noted  CHEST WALL AND LOWER NECK:   Unremarkable  VISUALIZED STRUCTURES IN THE UPPER ABDOMEN:  Partially visualized cyst in the left upper quadrant likely renal   This measures up to 9 3 cm in size  OSSEOUS STRUCTURES:  No acute fracture or destructive osseous lesion  Impression: 1  Scattered patchy consolidation infiltrates bilaterally compatible with multifocal pneumonia  Recommend follow-up to resolution  Follow-up chest CT recommended in 6-8 weeks  2  Enlarged right pericardiac lymph node, question reactive  This can be reassessed on follow-up  3   New small right pleural effusion  4   Partially visualized large cyst in the left upper quadrant likely of renal origin  This could be followed up with renal ultrasound  The study was marked in EPIC for significant notification  Workstation performed: [id number]     Us Kidney And Bladder    Result Date: 1/29/2019  Narrative: RENAL ULTRASOUND INDICATION:   N28 1: Cyst of kidney, acquired  COMPARISON: CT abdomen and pelvis 1/19/2019 TECHNIQUE:   Ultrasound of the retroperitoneum was performed with a curvilinear transducer utilizing volumetric sweeps and still imaging techniques  FINDINGS: KIDNEYS: Symmetric and normal size  Right kidney:  13 6 x 5 4 cm  Left kidney:  14 6 x 4 6 cm  Right kidney Normal echogenicity and contour  No suspicious masses detected  Several cortical cysts identified the largest exophytic from the lower pole which is simple and anechoic measuring 8 0 x 5 1 x 7 7 cm  This was not included on the recent CT of the chest  No hydronephrosis  No shadowing calculi  No perinephric fluid collections  Left kidney Normal echogenicity and contour  No suspicious masses detected  Multiple simple cysts are also identified the largest exophytic from the upper pole measuring 10 4 x 8 1 x 8 2 cm which was described on the recent CT  No hydronephrosis  No shadowing calculi  No perinephric fluid collections   URETERS: Nonvisualized  BLADDER: Normally distended  No mass lesions  A thick septation at the posterior bladder base is avascular and may be sequela of chronic infection  Bilateral ureteral jets detected  Multiple gallstones identified without evidence for acute cholecystitis  Impression: 1  Simple bilateral renal cysts with no hydronephrosis or perinephric collections  2   Avascular thickened septation at the posterior bladder base, perhaps sequela of remote infection  Correlation with clinical history recommended  3   Cholelithiasis  Workstation performed: [id number]       Discussion/Summary:  1  Paroxysmal atrial fibrillation (HCC)    2  Benign essential hypertension    3  Other hyperlipidemia      - she remains in sinus rhythm  She is on Xarelto to prevent CVA  - cough stopped with discontinuation of lisinopril - BP is mildly elevated today but has been well controlled during visits with other physicians  Will recheck next visit  - Based on ACC/AHA 2017 guidelines, the target BP in a person >72years of age and no other significant comorbid conditions is <130/80 in office measurements and < 125/80 ambulatory  Continue to monitor regularly

## 2019-09-16 ENCOUNTER — OFFICE VISIT (OUTPATIENT)
Dept: FAMILY MEDICINE CLINIC | Facility: CLINIC | Age: 84
End: 2019-09-16
Payer: MEDICARE

## 2019-09-16 VITALS
SYSTOLIC BLOOD PRESSURE: 124 MMHG | BODY MASS INDEX: 24.11 KG/M2 | RESPIRATION RATE: 14 BRPM | DIASTOLIC BLOOD PRESSURE: 70 MMHG | TEMPERATURE: 97.6 F | HEART RATE: 72 BPM | HEIGHT: 62 IN | WEIGHT: 131 LBS

## 2019-09-16 DIAGNOSIS — I10 BENIGN ESSENTIAL HYPERTENSION: Primary | ICD-10-CM

## 2019-09-16 DIAGNOSIS — Z23 NEED FOR INFLUENZA VACCINATION: ICD-10-CM

## 2019-09-16 PROCEDURE — 90662 IIV NO PRSV INCREASED AG IM: CPT | Performed by: FAMILY MEDICINE

## 2019-09-16 PROCEDURE — G0008 ADMIN INFLUENZA VIRUS VAC: HCPCS | Performed by: FAMILY MEDICINE

## 2019-09-16 PROCEDURE — 99213 OFFICE O/P EST LOW 20 MIN: CPT | Performed by: FAMILY MEDICINE

## 2019-09-16 NOTE — PROGRESS NOTES
Chief Complaint   Patient presents with    Follow-up     chronic conditions        Patient ID: Waqar Chirinos is a 80 y o  female  HPI  Pt is seeing for f/u HTN - stable, taking meds    The following portions of the patient's history were reviewed and updated as appropriate: allergies, current medications, past family history, past medical history, past social history, past surgical history and problem list     Review of Systems   Constitutional: Negative  Respiratory: Negative  Cardiovascular: Negative  Gastrointestinal: Negative  Genitourinary: Negative  Musculoskeletal: Negative  Skin: Negative  Neurological: Negative  Current Outpatient Medications   Medication Sig Dispense Refill    alendronate (FOSAMAX) 70 mg tablet TAKE 1 TABLET BY MOUTH ONCE EACH WEEK  12 tablet 3    ezetimibe-simvastatin (VYTORIN) 10-20 mg per tablet Take 1 tablet by mouth daily at bedtime 90 tablet 3    hydrochlorothiazide (HYDRODIURIL) 25 mg tablet TAKE ONE TABLET BY MOUTH EVERY DAY 90 tablet 3    metoprolol tartrate (LOPRESSOR) 25 mg tablet Take 1 tablet (25 mg total) by mouth 2 (two) times a day 90 tablet 3    XARELTO 20 MG tablet TAKE ONE TABLET BY MOUTH EVERY DAY WITH DINNER 30 tablet 5     No current facility-administered medications for this visit  Objective:    /70 (BP Location: Left arm, Patient Position: Sitting, Cuff Size: Standard)   Pulse 72   Temp 97 6 °F (36 4 °C) (Tympanic)   Resp 14   Ht 5' 2" (1 575 m)   Wt 59 4 kg (131 lb)   BMI 23 96 kg/m²        Physical Exam   Constitutional: She appears well-developed and well-nourished  No distress  Cardiovascular: Normal rate and regular rhythm  Exam reveals no gallop  Murmur heard  Pulmonary/Chest: Effort normal and breath sounds normal  No respiratory distress  She has no wheezes  She has no rales  She exhibits no tenderness  Abdominal: There is no tenderness  Musculoskeletal: She exhibits no edema     Psychiatric: She has a normal mood and affect  Labs in chart were reviewed        Assessment/Plan:         Diagnoses and all orders for this visit:    Benign essential hypertension  Stable  Cont meds  Need for influenza vaccination  -     influenza vaccine, [id number], high-dose, PF 0 5 mL (FLUZONE HIGH-DOSE)          rto for AWV in 1 m                   Tri Quevedo MD

## 2019-09-16 NOTE — TELEPHONE ENCOUNTER
Pharmacist had dispensed Xarelto to the patient when I spoke with them on Friday  They accepted the BMP results provided

## 2019-09-26 ENCOUNTER — OFFICE VISIT (OUTPATIENT)
Dept: PULMONOLOGY | Facility: MEDICAL CENTER | Age: 84
End: 2019-09-26
Payer: MEDICARE

## 2019-09-26 VITALS
HEART RATE: 63 BPM | HEIGHT: 62 IN | DIASTOLIC BLOOD PRESSURE: 72 MMHG | RESPIRATION RATE: 12 BRPM | BODY MASS INDEX: 24.11 KG/M2 | WEIGHT: 131 LBS | OXYGEN SATURATION: 95 % | SYSTOLIC BLOOD PRESSURE: 120 MMHG | TEMPERATURE: 97.3 F

## 2019-09-26 DIAGNOSIS — J43.2 CENTRILOBULAR EMPHYSEMA (HCC): ICD-10-CM

## 2019-09-26 DIAGNOSIS — R91.1 LUNG NODULE: Primary | ICD-10-CM

## 2019-09-26 PROCEDURE — 99214 OFFICE O/P EST MOD 30 MIN: CPT | Performed by: INTERNAL MEDICINE

## 2019-10-08 NOTE — ASSESSMENT & PLAN NOTE
Suly Stephens does have some moderate COPD and she never smoked  Prior complete PFT done in March 2018 showed FEV1 of 1 06 L or 59% of predicted  She has some moderate air trapping and diffusion capacity was decreased to 27% of predicted  She never smoked  She is not really complaining much of any way exertional dyspnea  There is only mild  She did not feel she needed any maintenance inhaler  The past I gave her sample of Anoro which she did not use  She does have nebulizer with DuoNeb she will use on an as-needed basis

## 2019-10-08 NOTE — ASSESSMENT & PLAN NOTE
I reviewed CT of chest done 9/11/2019 and shows a 7 mm tubular nodular density not changed compared to prior CT scan done 03/05/2019  No other significant abnormalities  I have arranged for a follow-up CT scan without contrast in 1 year

## 2019-10-08 NOTE — PROGRESS NOTES
Assessment/Plan        Problem List Items Addressed This Visit        Respiratory    Centrilobular emphysema ([facility] [address] )     Monse does have some moderate COPD and she never smoked  Prior complete PFT done in March 2018 showed FEV1 of 1 06 L or 59% of predicted  She has some moderate air trapping and diffusion capacity was decreased to 27% of predicted  She never smoked  She is not really complaining much of any way exertional dyspnea  There is only mild  She did not feel she needed any maintenance inhaler  The past I gave her sample of Anoro which she did not use  She does have nebulizer with DuoNeb she will use on an as-needed basis  Other    Lung nodule - Primary     I reviewed CT of chest done 9/11/2019 and shows a 7 mm tubular nodular density not changed compared to prior CT scan done 03/05/2019  No other significant abnormalities  I have arranged for a follow-up CT scan without contrast in 1 year  Relevant Orders    CT chest without contrast            Follow-up      HPI     Clarence Salazar has prior complete PFT done March 2019 which showed evidence of moderate airflow obstruction  FEV1 was 1 06 L or 59% predicted  Obstructive index was decreased at 63% residual I am was elevated at 168% of predicted  Her diffusion capacity was severely decreased at 27% of predicted  She never smoked  She does have nebulizer DuoNeb she uses as needed  She is not on any maintenance inhaler as she did not feel like she needed 1  She denies any new cough, fever or chills        Past Medical History:   Diagnosis Date    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[facility] [address] )     Community acquired pneumonia     last assessed: 10/11/16    Hyperlipidemia     Hypertension     Irregular heart beat     Kidney stone     Lung abnormality        Past Surgical History:   Procedure Laterality Date    APPENDECTOMY      BOWEL RESECTION      CATARACT EXTRACTION EXTRACAPSULAR W/ INTRAOCULAR LENS IMPLANTATION Bilateral     COLECTOMY Partial    COLON SURGERY      colon cancer    COLONOSCOPY N/A 8/5/2016    Procedure: COLONOSCOPY;  Surgeon: Wiley Matthews MD;  Location: [facility]; Service:     EYE SURGERY      HYSTERECTOMY      THROAT SURGERY      TRACHEAL SURGERY      tumor removed benign    TUMOR REMOVAL N/A 2000    trachea         Current Outpatient Medications:     alendronate (FOSAMAX) 70 mg tablet, TAKE 1 TABLET BY MOUTH ONCE EACH WEEK , Disp: 12 tablet, Rfl: 3    ezetimibe-simvastatin (VYTORIN) 10-20 mg per tablet, Take 1 tablet by mouth daily at bedtime, Disp: 90 tablet, Rfl: 3    hydrochlorothiazide (HYDRODIURIL) 25 mg tablet, TAKE ONE TABLET BY MOUTH EVERY DAY, Disp: 90 tablet, Rfl: 3    metoprolol tartrate (LOPRESSOR) 25 mg tablet, Take 1 tablet (25 mg total) by mouth 2 (two) times a day, Disp: 90 tablet, Rfl: 3    XARELTO 20 MG tablet, TAKE ONE TABLET BY MOUTH EVERY DAY WITH DINNER, Disp: 30 tablet, Rfl: 5    Allergies   Allergen Reactions    Azithromycin     Niacin And Related     Keflex [Cephalexin] Rash       Social History     Tobacco Use    Smoking status: Never Smoker    Smokeless tobacco: Never Used   Substance Use Topics    Alcohol use: No         Family History   Problem Relation Age of Onset    Diabetes Sister     Hypertension Sister     Stroke Mother     Hypertension Mother        Review of Systems   Constitutional: Negative for activity change, appetite change, fatigue and fever  HENT: Negative for congestion and postnasal drip  Eyes: Negative for redness  Respiratory: Negative for cough and shortness of breath  Cardiovascular: Negative for leg swelling  Gastrointestinal: Negative for abdominal distention and abdominal pain  Endocrine: Negative for polydipsia and polyphagia  Musculoskeletal: Negative for joint swelling  Neurological: Negative for dizziness             Vitals:    09/26/19 0856   BP: 120/72   Pulse: 63   Resp: 12   Temp: (!) 97 3 °F (36 3 °C)   SpO2: 95% Physical Exam   Constitutional: She is oriented to person, place, and time  She appears well-developed and well-nourished  No distress  HENT:   Head: Normocephalic  Nose: Nose normal    Mouth/Throat: Oropharynx is clear and moist  No oropharyngeal exudate  Eyes: Pupils are equal, round, and reactive to light  Conjunctivae and EOM are normal    Neck: Neck supple  No JVD present  No tracheal deviation present  Cardiovascular: Normal rate, regular rhythm and normal heart sounds  Pulmonary/Chest: Effort normal  Tenderness: ng sounds are clear  Lung sounds are clear   Abdominal: Soft  She exhibits no distension  There is no tenderness  Musculoskeletal:   No edema of lower extremities   Neurological: She is alert and oriented to person, place, and time  Skin: Skin is warm and dry  Psychiatric: She has a normal mood and affect  I personally review CT of chest done without contrast on September 11th, 2019    This reveals a 7 mm tubular infiltrate which is not changed compared to prior CT scan done 03/05/2019  She also has some bilateral kidney cysts

## 2019-10-18 ENCOUNTER — OFFICE VISIT (OUTPATIENT)
Dept: FAMILY MEDICINE CLINIC | Facility: CLINIC | Age: 84
End: 2019-10-18
Payer: MEDICARE

## 2019-10-18 VITALS
BODY MASS INDEX: 24.11 KG/M2 | HEIGHT: 62 IN | DIASTOLIC BLOOD PRESSURE: 60 MMHG | HEART RATE: 64 BPM | WEIGHT: 131 LBS | TEMPERATURE: 97 F | SYSTOLIC BLOOD PRESSURE: 104 MMHG | RESPIRATION RATE: 14 BRPM

## 2019-10-18 DIAGNOSIS — E78.5 HYPERLIPIDEMIA, UNSPECIFIED HYPERLIPIDEMIA TYPE: ICD-10-CM

## 2019-10-18 DIAGNOSIS — Z00.00 ROUTINE MEDICAL EXAM: Primary | ICD-10-CM

## 2019-10-18 DIAGNOSIS — I10 BENIGN ESSENTIAL HYPERTENSION: ICD-10-CM

## 2019-10-18 PROCEDURE — G0439 PPPS, SUBSEQ VISIT: HCPCS | Performed by: FAMILY MEDICINE

## 2019-10-18 NOTE — PATIENT INSTRUCTIONS
Medicare Preventive Visit Patient Instructions  Thank you for completing your Welcome to Medicare Visit or Medicare Annual Wellness Visit today  Your next wellness visit will be due in one year (10/18/2020)  The screening/preventive services that you may require over the next 5-10 years are detailed below  Some tests may not apply to you based off risk factors and/or age  Screening tests ordered at today's visit but not completed yet may show as past due  Also, please note that scanned in results may not display below  Preventive Screenings:  Service Recommendations Previous Testing/Comments   Colorectal Cancer Screening  * Colonoscopy    * Fecal Occult Blood Test (FOBT)/Fecal Immunochemical Test (FIT)  * Fecal DNA/Cologuard Test  * Flexible Sigmoidoscopy Age: 54-65 years old   Colonoscopy: every 10 years (may be performed more frequently if at higher risk)  OR  FOBT/FIT: every 1 year  OR  Cologuard: every 3 years  OR  Sigmoidoscopy: every 5 years  Screening may be recommended earlier than age 48 if at higher risk for colorectal cancer  Also, an individualized decision between you and your healthcare provider will decide whether screening between the ages of 74-80 would be appropriate  Colonoscopy: 08/05/2016  FOBT/FIT: Not on file  Cologuard: Not on file  Sigmoidoscopy: Not on file         Breast Cancer Screening Age: 36 years old  Frequency: every 1-2 years  Not required if history of left and right mastectomy Mammogram: Not on file       Cervical Cancer Screening Between the ages of 21-29, pap smear recommended once every 3 years  Between the ages of 33-67, can perform pap smear with HPV co-testing every 5 years     Recommendations may differ for women with a history of total hysterectomy, cervical cancer, or abnormal pap smears in past  Pap Smear: Not on file       Hepatitis C Screening Once for adults born between [facility]  More frequently in patients at high risk for Hepatitis C Hep C Antibody: Not on file       Diabetes Screening 1-2 times per year if you're at risk for diabetes or have pre-diabetes Fasting glucose: 104 mg/dL   A1C: No results in last 5 years       Cholesterol Screening Once every 5 years if you don't have a lipid disorder  May order more often based on risk factors  Lipid panel: 10/15/2018         Other Preventive Screenings Covered by Medicare:  1  Abdominal Aortic Aneurysm (AAA) Screening: covered once if your at risk  You're considered to be at risk if you have a family history of AAA  2  Lung Cancer Screening: covers low dose CT scan once per year if you meet all of the following conditions: (1) Age 50-69; (2) No signs or symptoms of lung cancer; (3) Current smoker or have quit smoking within the last 15 years; (4) You have a tobacco smoking history of at least 30 pack years (packs per day multiplied by number of years you smoked); (5) You get a written order from a healthcare provider  3  Glaucoma Screening: covered annually if you're considered high risk: (1) You have diabetes OR (2) Family history of glaucoma OR (3)  aged 48 and older OR (3)  American aged 72 and older  3  Osteoporosis Screening: covered every 2 years if you meet one of the following conditions: (1) You're estrogen deficient and at risk for osteoporosis based off medical history and other findings; (2) Have a vertebral abnormality; (3) On glucocorticoid therapy for more than 3 months; (4) Have primary hyperparathyroidism; (5) On osteoporosis medications and need to assess response to drug therapy  · Last bone density test (DXA Scan): 05/01/2018  5  HIV Screening: covered annually if you're between the age of 12-76  Also covered annually if you are younger than 13 and older than 72 with risk factors for HIV infection  For pregnant patients, it is covered up to 3 times per pregnancy      Immunizations:  Immunization Recommendations   Influenza Vaccine Annual influenza vaccination during flu season is recommended for all persons aged >= 6 months who do not have contraindications   Pneumococcal Vaccine (Prevnar and Pneumovax)  * Prevnar = PCV13  * Pneumovax = PPSV23   Adults 25-60 years old: 1-3 doses may be recommended based on certain risk factors  Adults 72 years old: Prevnar (PCV13) vaccine recommended followed by Pneumovax (PPSV23) vaccine  If already received PPSV23 since turning 65, then PCV13 recommended at least one year after PPSV23 dose  Hepatitis B Vaccine 3 dose series if at intermediate or high risk (ex: diabetes, end stage renal disease, liver disease)   Tetanus (Td) Vaccine - COST NOT COVERED BY MEDICARE PART B Following completion of primary series, a booster dose should be given every 10 years to maintain immunity against tetanus  Td may also be given as tetanus wound prophylaxis  Tdap Vaccine - COST NOT COVERED BY MEDICARE PART B Recommended at least once for all adults  For pregnant patients, recommended with each pregnancy  Shingles Vaccine (Shingrix) - COST NOT COVERED BY MEDICARE PART B  2 shot series recommended in those aged 48 and above     Health Maintenance Due:      Topic Date Due    CRC Screening: Colonoscopy  08/05/2021     Immunizations Due:  There are no preventive care reminders to display for this patient  Advance Directives   What are advance directives? Advance directives are legal documents that state your wishes and plans for medical care  These plans are made ahead of time in case you lose your ability to make decisions for yourself  Advance directives can apply to any medical decision, such as the treatments you want, and if you want to donate organs  What are the types of advance directives? There are many types of advance directives, and each state has rules about how to use them  You may choose a combination of any of the following:  · Living will: This is a written record of the treatment you want   You can also choose which treatments you do not want, which to limit, and which to stop at a certain time  This includes surgery, medicine, IV fluid, and tube feedings  · Durable power of  for healthcare [facility]): This is a written record that states who you want to make healthcare choices for you when you are unable to make them for yourself  This person, called a proxy, is usually a family member or a friend  You may choose more than 1 proxy  · Do not resuscitate (DNR) order:  A DNR order is used in case your heart stops beating or you stop breathing  It is a request not to have certain forms of treatment, such as CPR  A DNR order may be included in other types of advance directives  · Medical directive: This covers the care that you want if you are in a coma, near death, or unable to make decisions for yourself  You can list the treatments you want for each condition  Treatment may include pain medicine, surgery, blood transfusions, dialysis, IV or tube feedings, and a ventilator (breathing machine)  · Values history: This document has questions about your views, beliefs, and how you feel and think about life  This information can help others choose the care that you would choose  Why are advance directives important? An advance directive helps you control your care  Although spoken wishes may be used, it is better to have your wishes written down  Spoken wishes can be misunderstood, or not followed  Treatments may be given even if you do not want them  An advance directive may make it easier for your family to make difficult choices about your care  Urinary Incontinence   Urinary incontinence (UI)  is when you lose control of your bladder  UI develops because your bladder cannot store or empty urine properly  The 3 most common types of UI are stress incontinence, urge incontinence, or both  Medicines:   · May be given to help strengthen your bladder control  Report any side effects of medication to your healthcare provider    Do pelvic muscle exercises often:  Your pelvic muscles help you stop urinating  Squeeze these muscles tight for 5 seconds, then relax for 5 seconds  Gradually work up to squeezing for 10 seconds  Do 3 sets of 15 repetitions a day, or as directed  This will help strengthen your pelvic muscles and improve bladder control  Train your bladder:  Go to the bathroom at set times, such as every 2 hours, even if you do not feel the urge to go  You can also try to hold your urine when you feel the urge to go  For example, hold your urine for 5 minutes when you feel the urge to go  As that becomes easier, hold your urine for 10 minutes  Self-care:   · Keep a UI record  Write down how often you leak urine and how much you leak  Make a note of what you were doing when you leaked urine  · Drink liquids as directed  You may need to limit the amount of liquid you drink to help control your urine leakage  Do not drink any liquid right before you go to bed  Limit or do not have drinks that contain caffeine or alcohol  · Prevent constipation  Eat a variety of high-fiber foods  Good examples are high-fiber cereals, beans, vegetables, and whole-grain breads  Walking is the best way to trigger your intestines to have a bowel movement  · Exercise regularly and maintain a healthy weight  Weight loss and exercise will decrease pressure on your bladder and help you control your leakage  · Use a catheter as directed  to help empty your bladder  A catheter is a tiny, plastic tube that is put into your bladder to drain your urine  · Go to behavior therapy as directed  Behavior therapy may be used to help you learn to control your urge to urinate  © Copyright [employer] 2018 Information is for End User's use only and may not be sold, redistributed or otherwise used for commercial purposes   All illustrations and images included in CareNotes® are the copyrighted property of [employer]  or [employer]

## 2019-10-18 NOTE — PROGRESS NOTES
Assessment and Plan:     Problem List Items Addressed This Visit        Cardiovascular and Mediastinum    Benign essential hypertension    Relevant Orders    Comprehensive metabolic panel    Lipid panel      Other Visit Diagnoses     Routine medical exam    -  Primary    Hyperlipidemia, unspecified hyperlipidemia type        Relevant Orders    Comprehensive metabolic panel    Lipid panel           Preventive health issues were discussed with patient, and age appropriate screening tests were ordered as noted in patient's After Visit Summary  Personalized health advice and appropriate referrals for health education or preventive services given if needed, as noted in patient's After Visit Summary  History of Present Illness:     Patient presents for Medicare Annual Wellness visit    Patient Care Team:  Carlos Malik MD as PCP - General  MD Gurwinder Castañeda MD as Endoscopist     Problem List:     Patient Active Problem List   Diagnosis    Benign essential hypertension    Other hyperlipidemia    Macular degeneration    Post-menopausal osteoporosis    Pleural effusion on right    Mediastinal adenopathy    Renal cyst    Thrombocytosis ([address]    Paroxysmal atrial fibrillation ([address]    Centrilobular emphysema ([address]    Lung nodule      Past Medical and Surgical History:     Past Medical History:   Diagnosis Date    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[address] )     Community acquired pneumonia     last assessed: 10/11/16    Hyperlipidemia     Hypertension     Irregular heart beat     Kidney stone     Lung abnormality      Past Surgical History:   Procedure Laterality Date    APPENDECTOMY      BOWEL RESECTION      CATARACT EXTRACTION EXTRACAPSULAR W/ INTRAOCULAR LENS IMPLANTATION Bilateral     COLECTOMY      Partial    COLON SURGERY      colon cancer    COLONOSCOPY N/A 8/5/2016    Procedure: COLONOSCOPY;  Surgeon: Gurwinder Burroughs MD;  [facility];   Service:     EYE SURGERY      HYSTERECTOMY      THROAT SURGERY      TRACHEAL SURGERY      tumor removed benign    TUMOR REMOVAL N/A 2000    trachea      Family History:     Family History   Problem Relation Age of Onset    Diabetes Sister     Hypertension Sister    Yazan Gregory Stroke Mother     Hypertension Mother       Social History:     Social History     Socioeconomic History    Marital status:    Tobacco Use    Smoking status: Never Smoker    Smokeless tobacco: Never Used   Substance and Sexual Activity    Alcohol use: No    Drug use: No   Lifestyle   Relationships   Other Topics Concern    None   Social History Narrative    Daily tea consumption ( 1 cups/day)       Medications and Allergies:     Current Outpatient Medications   Medication Sig Dispense Refill    alendronate (FOSAMAX) 70 mg tablet TAKE 1 TABLET BY MOUTH ONCE EACH WEEK  12 tablet 3    ezetimibe-simvastatin (VYTORIN) 10-20 mg per tablet Take 1 tablet by mouth daily at bedtime 90 tablet 3    hydrochlorothiazide (HYDRODIURIL) 25 mg tablet TAKE ONE TABLET BY MOUTH EVERY DAY 90 tablet 3    metoprolol tartrate (LOPRESSOR) 25 mg tablet Take 1 tablet (25 mg total) by mouth 2 (two) times a day 90 tablet 3    XARELTO 20 MG tablet TAKE ONE TABLET BY MOUTH EVERY DAY WITH DINNER 30 tablet 5     No current facility-administered medications for this visit        Allergies   Allergen Reactions    Azithromycin     Niacin And Related     Keflex [Cephalexin] Rash      Immunizations:     Immunization History   Administered Date(s) Administered    Influenza Split High Dose Preservative Free IM 10/09/2012, 11/03/2014, 10/11/2016, 10/13/2017    Influenza TIV (IM) 10/18/2006, 10/10/2007, 10/31/2008, 10/12/2010, 10/10/2011, 12/27/2013    Influenza, high dose seasonal 0 5 mL 10/15/2018, 09/16/2019    Pneumococcal Conjugate 13-Valent 06/29/2015    Pneumococcal Polysaccharide PPV23 06/24/2013    Tdap 04/05/2007      Health Maintenance:         Topic Date Due    CRC Screening: Colonoscopy  08/05/2021     There are no preventive care reminders to display for this patient  Medicare Health Risk Assessment:     /60 (BP Location: Right arm, Patient Position: Sitting, Cuff Size: Standard)   Pulse 64   Temp (!) 97 °F (36 1 °C) (Tympanic)   Resp 14   Ht 5' 2" (1 575 m)   Wt 59 4 kg (131 lb)   BMI 23 96 kg/m²      Ramonita Marcos is here for her Subsequent Wellness visit  Last Medicare Wellness visit information reviewed, patient interviewed and updates made to the record today  Health Risk Assessment:   Patient rates overall health as good  Patient feels that their physical health rating is same  Eyesight was rated as much worse  Hearing was rated as same  Patient feels that their emotional and mental health rating is same  Pain experienced in the last 7 days has been some  Patient's pain rating has been 1/10  Patient states that she has experienced no weight loss or gain in last 6 months  Under ophthalmologist care for macular degeneration     Depression Screening:   PHQ-2 Score: 2      Fall Risk Screening: In the past year, patient has experienced: no history of falling in past year      Urinary Incontinence Screening:   Patient has leaked urine accidently in the last six months  Home Safety:  Patient does not have trouble with stairs inside or outside of their home  Patient has working smoke alarms and has working carbon monoxide detector  Home safety hazards include: none  Nutrition:   Current diet is Regular, Low Cholesterol and Low Saturated Fat  Medications:   Patient is currently taking over-the-counter supplements  OTC medications include: see medication list  Patient is able to manage medications  Activities of Daily Living (ADLs)/Instrumental Activities of Daily Living (IADLs):   Walk and transfer into and out of bed and chair?: Yes  Dress and groom yourself?: Yes    Bathe or shower yourself?: Yes    Feed yourself?  Yes  Do your laundry/housekeeping?: No  Manage your money, pay your bills and track your expenses?: Yes  Make your own meals?: Yes    Do your own shopping?: Yes    ADL comments: Family is helping with bauer/housekeeping     Previous Hospitalizations:   Any hospitalizations or ED visits within the last 12 months?: Yes    How many hospitalizations have you had in the last year?: 1-2    Hospitalization Comments: 01/2019 -  Sepsis 2 to pneumonia     Advance Care Planning:   Living will: Yes    Durable POA for healthcare:  Yes    Advanced directive: Yes      PREVENTIVE SCREENINGS      Cardiovascular Screening:    General: Screening Not Indicated and History Lipid Disorder    Due for: Lipid Panel      Diabetes Screening:     General: Screening Current      Colorectal Cancer Screening:     General: Screening Not Indicated      Breast Cancer Screening:     General: Screening Not Indicated      Cervical Cancer Screening:    General: Screening Not Indicated      Osteoporosis Screening:    General: History Osteoporosis and Screening Current      Abdominal Aortic Aneurysm (AAA) Screening:        General: Screening Not Indicated      Lung Cancer Screening:     General: Screening Current      Hepatitis C Screening:    General: Screening Not Indicated      Shannon Hampton MD

## 2019-11-05 ENCOUNTER — APPOINTMENT (OUTPATIENT)
Dept: LAB | Facility: CLINIC | Age: 84
End: 2019-11-05
Payer: MEDICARE

## 2019-12-02 ENCOUNTER — HOSPITAL ENCOUNTER (OUTPATIENT)
Dept: RADIOLOGY | Facility: HOSPITAL | Age: 84
Discharge: HOME | End: 2019-12-02
Attending: INTERNAL MEDICINE
Payer: MEDICARE

## 2019-12-02 DIAGNOSIS — R91.1 LUNG NODULE: ICD-10-CM

## 2019-12-02 PROCEDURE — 71250 CT THORAX DX C-: CPT

## 2020-01-04 ENCOUNTER — OFFICE VISIT (OUTPATIENT)
Dept: FAMILY MEDICINE CLINIC | Facility: CLINIC | Age: 85
End: 2020-01-04
Payer: MEDICARE

## 2020-01-04 VITALS
DIASTOLIC BLOOD PRESSURE: 66 MMHG | WEIGHT: 130 LBS | SYSTOLIC BLOOD PRESSURE: 120 MMHG | HEART RATE: 60 BPM | RESPIRATION RATE: 18 BRPM | TEMPERATURE: 97.7 F | BODY MASS INDEX: 23.92 KG/M2 | HEIGHT: 62 IN

## 2020-01-04 DIAGNOSIS — L98.9 SKIN LESION OF CHEEK: Primary | ICD-10-CM

## 2020-01-04 DIAGNOSIS — Z86.19 HISTORY OF SHINGLES: ICD-10-CM

## 2020-01-04 PROBLEM — I50.31 ACUTE DIASTOLIC CONGESTIVE HEART FAILURE (HCC): Status: ACTIVE | Noted: 2020-01-04

## 2020-01-04 PROCEDURE — 99213 OFFICE O/P EST LOW 20 MIN: CPT | Performed by: FAMILY MEDICINE

## 2020-01-04 RX ORDER — VALACYCLOVIR HYDROCHLORIDE 500 MG/1
500 TABLET, FILM COATED ORAL 3 TIMES DAILY
Qty: 21 TABLET | Refills: 0 | Status: SHIPPED | OUTPATIENT
Start: 2020-01-04 | End: 2021-01-11

## 2020-01-04 RX ORDER — MUPIROCIN CALCIUM 20 MG/G
CREAM TOPICAL 3 TIMES DAILY
Qty: 15 G | Refills: 0 | Status: SHIPPED | OUTPATIENT
Start: 2020-01-04 | End: 2021-01-11

## 2020-02-03 NOTE — PROGRESS NOTES
Assessment/Plan:     Diagnoses and all orders for this visit:    Skin lesion of cheek  Comments:  rx Bactroban  if persists will ref to [facility]  Orders:  -     mupirocin (BACTROBAN) 2 % cream; Apply topically 3 (three) times a day x7days  -     valACYclovir (VALTREX) 500 mg tablet; Take 1 tablet (500 mg total) by mouth 3 (three) times a day for 7 days    History of shingles  Comments:  rx Valtrex  Orders:  -     valACYclovir (VALTREX) 500 mg tablet; Take 1 tablet (500 mg total) by mouth 3 (three) times a day for 7 days            Subjective:      Patient ID: Eros Pham is a 80 y o  female  Chief Complaint   Patient presents with    Facial Swelling     pt c/o swelling on right cheek        79 yo pt in w c/o right cheek swelling and lesion  No known trauma/injury/insect bite etc   Denies use of any new topical agents  Hx Shingles-pt concerned for recurrence  The following portions of the patient's history were reviewed and updated as appropriate: allergies, current medications, past family history, past medical history, past social history, past surgical history and problem list      Review of Systems   Constitutional: Positive for fatigue  Negative for fever  Respiratory: Negative  Cardiovascular: Negative  Musculoskeletal: Positive for arthralgias  Skin: Positive for rash  Psychiatric/Behavioral: Positive for sleep disturbance  Objective:    /66 (BP Location: Left arm, Patient Position: Sitting, Cuff Size: Standard)   Pulse 60   Temp 97 7 °F (36 5 °C)   Resp 18   Ht 5' 2" (1 575 m)   Wt 59 kg (130 lb)   BMI 23 78 kg/m²        Physical Exam   Constitutional: She is oriented to person, place, and time  She appears well-developed and well-nourished  No distress  HENT:   Mouth/Throat: No oropharyngeal exudate  Eyes: Conjunctivae are normal  No scleral icterus  Neck: Neck supple  Cardiovascular: Normal rate and regular rhythm     Pulmonary/Chest: Effort normal and breath sounds normal    Neurological: She is alert and oriented to person, place, and time  No cranial nerve deficit  Skin: Skin is warm and dry  Pustular lesion right cheek w mild swelling   Psychiatric: She has a normal mood and affect  Nursing note and vitals reviewed            Pastora Jalloh MD

## 2020-02-06 DIAGNOSIS — I48.91 ATRIAL FIBRILLATION WITH RAPID VENTRICULAR RESPONSE (HCC): ICD-10-CM

## 2020-02-10 DIAGNOSIS — I48.91 ATRIAL FIBRILLATION WITH RAPID VENTRICULAR RESPONSE (HCC): ICD-10-CM

## 2020-02-11 RX ORDER — RIVAROXABAN 20 MG/1
TABLET, FILM COATED ORAL
Qty: 30 TABLET | Refills: 5 | Status: SHIPPED | OUTPATIENT
Start: 2020-02-11 | End: 2020-08-03

## 2020-02-27 ENCOUNTER — OFFICE VISIT (OUTPATIENT)
Dept: PULMONOLOGY | Facility: MEDICAL CENTER | Age: 85
End: 2020-02-27
Payer: MEDICARE

## 2020-02-27 VITALS
TEMPERATURE: 97.6 F | HEIGHT: 62 IN | OXYGEN SATURATION: 96 % | WEIGHT: 129 LBS | BODY MASS INDEX: 23.74 KG/M2 | HEART RATE: 70 BPM | RESPIRATION RATE: 12 BRPM | SYSTOLIC BLOOD PRESSURE: 118 MMHG | DIASTOLIC BLOOD PRESSURE: 76 MMHG

## 2020-02-27 DIAGNOSIS — J43.2 CENTRILOBULAR EMPHYSEMA (HCC): ICD-10-CM

## 2020-02-27 DIAGNOSIS — R91.1 LUNG NODULE: Primary | ICD-10-CM

## 2020-02-27 PROCEDURE — 3008F BODY MASS INDEX DOCD: CPT | Performed by: INTERNAL MEDICINE

## 2020-02-27 PROCEDURE — 3074F SYST BP LT 130 MM HG: CPT | Performed by: INTERNAL MEDICINE

## 2020-02-27 PROCEDURE — 4040F PNEUMOC VAC/ADMIN/RCVD: CPT | Performed by: INTERNAL MEDICINE

## 2020-02-27 PROCEDURE — 1036F TOBACCO NON-USER: CPT | Performed by: INTERNAL MEDICINE

## 2020-02-27 PROCEDURE — 3078F DIAST BP <80 MM HG: CPT | Performed by: INTERNAL MEDICINE

## 2020-02-27 PROCEDURE — 99214 OFFICE O/P EST MOD 30 MIN: CPT | Performed by: INTERNAL MEDICINE

## 2020-02-27 PROCEDURE — 1160F RVW MEDS BY RX/DR IN RCRD: CPT | Performed by: INTERNAL MEDICINE

## 2020-02-27 NOTE — PROGRESS NOTES
Assessment/Plan        Problem List Items Addressed This Visit        Respiratory    Centrilobular emphysema ([facility] [address] )     She has moderate COPD but is not having any symptoms from it  She never smoked  Complete PFT done March 2018 showed FEV1 of 1 06 L or 59% of predicted  She does have a nebulizer with DuoNeb files but is not needing to use it  She is not complaining of any shortness of breath activity or wheezing  Other    Lung nodule - Primary     I did review CT of chest done without contrast that was done December 2, 2019  She has stable 13 mm peripheral right upper lobe nodular density  There is some other nodule infiltrates in right lower lobe which are not dramatically changed compared to prior CT scans  I suspect these are all inflammatory  At this time I do not feel she needs a follow-up CT scan  She is not have any chronic cough  I did review CT images with her  Shortness of Breath (good  ); Cough (occasional  dry); and Wheezing (occasional)      PIEDAD Chapman presents for a follow-up visit today and to review results of CT of chest without contrast was done December 2, 2019 regard nodule infiltrates of her lungs  Although she never smoke she has some moderate COPD is evidenced by pulmonary function test done March 2018 which showed FEV1 of 1 06 L or 59% of predicted  She is not have any shortness of breath her usual activities not wheezing and having only occasional cough  She does have a nebulizer at home with albuterol solution but states she does not need to use it  She is not using any inhaler therapy at all  She is not have any chronic cough for difficulty breathing  She never smoked  She does have history of colon cancer past and had hemicolectomy for this  Also has history of hypertension      Past Medical History:   Diagnosis Date    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     Community acquired pneumonia     last assessed: 10/11/16    Hyperlipidemia     Hypertension     Irregular heart beat     Kidney stone     Lung abnormality        Past Surgical History:   Procedure Laterality Date    APPENDECTOMY      BOWEL RESECTION      CATARACT EXTRACTION EXTRACAPSULAR W/ INTRAOCULAR LENS IMPLANTATION Bilateral     COLECTOMY      Partial    COLON SURGERY      colon cancer    COLONOSCOPY N/A 8/5/2016    Procedure: COLONOSCOPY;  Surgeon: Elissa Boyce MD;  Location: [facility] GI LAB; Service:     EYE SURGERY      HYSTERECTOMY      THROAT SURGERY      TRACHEAL SURGERY      tumor removed benign    TUMOR REMOVAL N/A 2000    trachea         Current Outpatient Medications:     alendronate (FOSAMAX) 70 mg tablet, TAKE 1 TABLET BY MOUTH ONCE EACH WEEK , Disp: 12 tablet, Rfl: 3    ezetimibe-simvastatin (VYTORIN) 10-20 mg per tablet, Take 1 tablet by mouth daily at bedtime, Disp: 90 tablet, Rfl: 3    hydrochlorothiazide (HYDRODIURIL) 25 mg tablet, TAKE ONE TABLET BY MOUTH EVERY DAY, Disp: 90 tablet, Rfl: 3    metoprolol tartrate (LOPRESSOR) 25 mg tablet, TAKE ONE TABLET BY MOUTH TWICE A DAY, Disp: 90 tablet, Rfl: 3    mupirocin (BACTROBAN) 2 % cream, Apply topically 3 (three) times a day x7days, Disp: 15 g, Rfl: 0    XARELTO 20 MG tablet, TAKE ONE TABLET BY MOUTH EVERY DAY WITH DINNER, Disp: 30 tablet, Rfl: 5    valACYclovir (VALTREX) 500 mg tablet, Take 1 tablet (500 mg total) by mouth 3 (three) times a day for 7 days, Disp: 21 tablet, Rfl: 0    Allergies   Allergen Reactions    Azithromycin     Niacin And Related     Keflex [Cephalexin] Rash       Social History     Tobacco Use    Smoking status: Never Smoker    Smokeless tobacco: Never Used   Substance Use Topics    Alcohol use: No         Family History   Problem Relation Age of Onset    Diabetes Sister     Hypertension Sister     Stroke Mother     Hypertension Mother        Review of Systems   Constitutional: Negative for activity change, appetite change, fatigue and fever     HENT: Negative for congestion and postnasal drip  Eyes: Negative for redness  Respiratory: Negative for cough, shortness of breath and wheezing  Cardiovascular: Negative for chest pain and leg swelling  Gastrointestinal: Negative for abdominal pain  Endocrine: Negative for polydipsia and polyphagia  Genitourinary: Negative for dysuria  Musculoskeletal: Negative for joint swelling  Neurological: Negative for light-headedness  Psychiatric/Behavioral: Negative for decreased concentration  Vitals:    02/27/20 1045   BP: 118/76   Pulse: 70   Resp: 12   Temp: 97 6 °F (36 4 °C)   SpO2: 96%           Physical Exam   Constitutional: She is oriented to person, place, and time  She appears well-developed and well-nourished  No distress  HENT:   Head: Normocephalic  Nose: Nose normal    Mouth/Throat: Oropharynx is clear and moist  No oropharyngeal exudate  Eyes: Pupils are equal, round, and reactive to light  Conjunctivae are normal    Neck: Neck supple  No JVD present  Cardiovascular: Normal rate, regular rhythm and normal heart sounds  Pulmonary/Chest: Effort normal    Lung sounds are clear  No wheezes, crackles or rhonchi   Abdominal: Soft  She exhibits no distension  There is no tenderness  Musculoskeletal:   No edema, cyanosis or clubbing   Lymphadenopathy:     She has no cervical adenopathy  Neurological: She is alert and oriented to person, place, and time  Skin: Skin is warm and dry  Psychiatric: She has a normal mood and affect           Office Spirometry Results:

## 2020-02-27 NOTE — ASSESSMENT & PLAN NOTE
She has moderate COPD but is not having any symptoms from it  She never smoked  Complete PFT done March 2018 showed FEV1 of 1 06 L or 59% of predicted  She does have a nebulizer with DuoNeb files but is not needing to use it  She is not complaining of any shortness of breath activity or wheezing

## 2020-02-27 NOTE — ASSESSMENT & PLAN NOTE
I did review CT of chest done without contrast that was done December 2, 2019  She has stable 13 mm peripheral right upper lobe nodular density  There is some other nodule infiltrates in right lower lobe which are not dramatically changed compared to prior CT scans  I suspect these are all inflammatory  At this time I do not feel she needs a follow-up CT scan  She is not have any chronic cough  I did review CT images with her

## 2020-04-17 ENCOUNTER — TELEMEDICINE (OUTPATIENT)
Dept: FAMILY MEDICINE CLINIC | Facility: CLINIC | Age: 85
End: 2020-04-17
Payer: MEDICARE

## 2020-04-17 DIAGNOSIS — I10 BENIGN ESSENTIAL HYPERTENSION: Primary | ICD-10-CM

## 2020-04-17 DIAGNOSIS — T14.8XXA BLISTER: ICD-10-CM

## 2020-04-17 PROCEDURE — 99441 PR PHYS/QHP TELEPHONE EVALUATION 5-10 MIN: CPT | Performed by: FAMILY MEDICINE

## 2020-05-07 ENCOUNTER — TELEMEDICINE (OUTPATIENT)
Dept: CARDIOLOGY CLINIC | Facility: CLINIC | Age: 85
End: 2020-05-07
Payer: MEDICARE

## 2020-05-07 VITALS — WEIGHT: 125 LBS | BODY MASS INDEX: 22.86 KG/M2

## 2020-05-07 DIAGNOSIS — I10 BENIGN ESSENTIAL HYPERTENSION: Primary | ICD-10-CM

## 2020-05-07 DIAGNOSIS — I48.0 PAROXYSMAL ATRIAL FIBRILLATION (HCC): ICD-10-CM

## 2020-05-07 DIAGNOSIS — I50.32 CHRONIC DIASTOLIC CONGESTIVE HEART FAILURE (HCC): ICD-10-CM

## 2020-05-07 PROCEDURE — G2012 BRIEF CHECK IN BY MD/QHP: HCPCS | Performed by: INTERNAL MEDICINE

## 2020-05-28 DIAGNOSIS — M81.0 OSTEOPOROSIS, UNSPECIFIED OSTEOPOROSIS TYPE, UNSPECIFIED PATHOLOGICAL FRACTURE PRESENCE: ICD-10-CM

## 2020-05-28 RX ORDER — ALENDRONATE SODIUM 70 MG/1
TABLET ORAL
Qty: 12 TABLET | Refills: 3 | Status: SHIPPED | OUTPATIENT
Start: 2020-05-28 | End: 2020-05-29

## 2020-05-29 DIAGNOSIS — M81.0 OSTEOPOROSIS, UNSPECIFIED OSTEOPOROSIS TYPE, UNSPECIFIED PATHOLOGICAL FRACTURE PRESENCE: ICD-10-CM

## 2020-05-29 RX ORDER — ALENDRONATE SODIUM 70 MG/1
TABLET ORAL
Qty: 12 TABLET | Refills: 3 | Status: SHIPPED | OUTPATIENT
Start: 2020-05-29 | End: 2021-07-12

## 2020-06-10 ENCOUNTER — TELEPHONE (OUTPATIENT)
Dept: PULMONOLOGY | Facility: MEDICAL CENTER | Age: 85
End: 2020-06-10

## 2020-06-11 DIAGNOSIS — I10 BENIGN ESSENTIAL HYPERTENSION: ICD-10-CM

## 2020-06-11 RX ORDER — HYDROCHLOROTHIAZIDE 25 MG/1
TABLET ORAL
Qty: 90 TABLET | Refills: 1 | Status: SHIPPED | OUTPATIENT
Start: 2020-06-11 | End: 2020-06-12

## 2020-06-12 DIAGNOSIS — E78.5 HYPERLIPIDEMIA, UNSPECIFIED HYPERLIPIDEMIA TYPE: ICD-10-CM

## 2020-06-12 DIAGNOSIS — I10 BENIGN ESSENTIAL HYPERTENSION: ICD-10-CM

## 2020-06-12 RX ORDER — EZETIMIBE AND SIMVASTATIN 10; 20 MG/1; MG/1
TABLET ORAL
Qty: 90 TABLET | Refills: 3 | Status: SHIPPED | OUTPATIENT
Start: 2020-06-12 | End: 2020-06-15

## 2020-06-12 RX ORDER — HYDROCHLOROTHIAZIDE 25 MG/1
TABLET ORAL
Qty: 90 TABLET | Refills: 3 | Status: SHIPPED | OUTPATIENT
Start: 2020-06-12 | End: 2020-12-29

## 2020-06-13 DIAGNOSIS — E78.5 HYPERLIPIDEMIA, UNSPECIFIED HYPERLIPIDEMIA TYPE: ICD-10-CM

## 2020-06-15 RX ORDER — EZETIMIBE AND SIMVASTATIN 10; 20 MG/1; MG/1
TABLET ORAL
Qty: 90 TABLET | Refills: 3 | Status: SHIPPED | OUTPATIENT
Start: 2020-06-15 | End: 2021-07-02

## 2020-08-03 DIAGNOSIS — I48.91 ATRIAL FIBRILLATION WITH RAPID VENTRICULAR RESPONSE (HCC): ICD-10-CM

## 2020-08-03 RX ORDER — RIVAROXABAN 20 MG/1
TABLET, FILM COATED ORAL
Qty: 30 TABLET | Refills: 5 | Status: SHIPPED | OUTPATIENT
Start: 2020-08-03 | End: 2021-02-10

## 2020-08-05 ENCOUNTER — HOSPITAL ENCOUNTER (OUTPATIENT)
Dept: RADIOLOGY | Facility: HOSPITAL | Age: 85
Discharge: HOME | End: 2020-08-05
Payer: MEDICARE

## 2020-08-05 DIAGNOSIS — N28.1 RENAL CYST: ICD-10-CM

## 2020-08-05 PROCEDURE — 51798 US URINE CAPACITY MEASURE: CPT

## 2020-08-12 ENCOUNTER — TELEPHONE (OUTPATIENT)
Dept: CARDIOLOGY CLINIC | Facility: CLINIC | Age: 85
End: 2020-08-12

## 2020-08-12 DIAGNOSIS — I48.91 ATRIAL FIBRILLATION WITH RAPID VENTRICULAR RESPONSE (HCC): ICD-10-CM

## 2020-08-12 NOTE — TELEPHONE ENCOUNTER
Patient will need a new Rx for Xarelto sent to [facility] in [place]  She just picked up her refill so she is good until the next time

## 2020-08-18 ENCOUNTER — OFFICE VISIT (OUTPATIENT)
Dept: UROLOGY | Facility: CLINIC | Age: 85
End: 2020-08-18
Payer: MEDICARE

## 2020-08-18 VITALS
TEMPERATURE: 97.3 F | DIASTOLIC BLOOD PRESSURE: 76 MMHG | RESPIRATION RATE: 18 BRPM | BODY MASS INDEX: 23.19 KG/M2 | HEIGHT: 62 IN | HEART RATE: 60 BPM | WEIGHT: 126 LBS | SYSTOLIC BLOOD PRESSURE: 122 MMHG

## 2020-08-18 DIAGNOSIS — N28.1 RENAL CYST: Primary | ICD-10-CM

## 2020-08-18 PROCEDURE — 1160F RVW MEDS BY RX/DR IN RCRD: CPT | Performed by: PHYSICIAN ASSISTANT

## 2020-08-18 PROCEDURE — 4040F PNEUMOC VAC/ADMIN/RCVD: CPT | Performed by: PHYSICIAN ASSISTANT

## 2020-08-18 PROCEDURE — 1036F TOBACCO NON-USER: CPT | Performed by: PHYSICIAN ASSISTANT

## 2020-08-18 PROCEDURE — 99213 OFFICE O/P EST LOW 20 MIN: CPT | Performed by: PHYSICIAN ASSISTANT

## 2020-08-18 PROCEDURE — 3008F BODY MASS INDEX DOCD: CPT | Performed by: PHYSICIAN ASSISTANT

## 2020-08-18 PROCEDURE — 3074F SYST BP LT 130 MM HG: CPT | Performed by: PHYSICIAN ASSISTANT

## 2020-08-18 PROCEDURE — 3078F DIAST BP <80 MM HG: CPT | Performed by: PHYSICIAN ASSISTANT

## 2020-08-18 NOTE — PROGRESS NOTES
1  Renal cyst           Assessment and plan:       1  Simple bilateral renal cysts  - I was happy to review with the patient that her renal cysts remal stable on her recent U/S without any concerning characteristics  - no further surveillance needed at this time    2  Stress urinary incontinence  - demonstrating adequate bladder on recent US  - encouraged proper hydration, timed voiding, and avoiding constipation  - patient not interested in surgical intervention for her urinary incontinence       Patient will follow with urology on an as needed basis  Encouraged to contact us in the future with any concerning  Génesis Bergman PA-C      Chief Complaint     Chief Complaint   Patient presents with    Kidney Cyst     follow up      History of Present Illness     Mani Tomas is a 80 y o  female patient previously under the care of Dr Mukesh Hillman with a history of renal cyst presenting for follow-up  Patient had a recent ultrasound of the kidney bladder (8/05/2020) which revealed bilateral simple renal cysts with a postvoid residual 14 mL  She denies any urinary complaints noting nocturia times 2-4 with good flow incomplete bladder emptying sensation  Does report some stress urinary incontinence requiring 1-2 pads a day  No prior hematuria urinary tract infection  Denies any known family history of genitourinary malignancy  Patient herself has a remote history of colon cancer  Urinary Incontinence Screening      Most Recent Value   Urinary Incontinence   Urinary Incontinence? Yes [2-3 depends a day ]   Incomplete emptying? No   Urinary frequency? Yes   Urinary urgency? No   Urinary hesitancy? No   Dysuria (painful difficult urination)? No   Nocturia (waking up to use the bathroom)? Yes [3-4 times ]   Straining (having to push to go)? No   Weak stream?  Yes [sometimes]   Intermittent stream?  Yes [sometimes]   Post void dribbling?   No          Laboratory     Lab Results   Component Value Date CREATININE 0 64 11/05/2019       Review of Systems     Review of Systems   Constitutional: Negative for activity change, appetite change, chills, diaphoresis, fatigue, fever and unexpected weight change  Respiratory: Negative for chest tightness and shortness of breath  Cardiovascular: Negative for chest pain, palpitations and leg swelling  Gastrointestinal: Negative for abdominal distention, abdominal pain, constipation, diarrhea, nausea and vomiting  Genitourinary: Positive for frequency and urgency  Negative for decreased urine volume, difficulty urinating, dysuria, enuresis, flank pain, genital sores and hematuria  Musculoskeletal: Negative for back pain, gait problem and myalgias  Skin: Negative for color change, pallor, rash and wound  Psychiatric/Behavioral: Negative for behavioral problems  The patient is not nervous/anxious  Allergies     Allergies   Allergen Reactions    Azithromycin     Niacin And Related     Keflex [Cephalexin] Rash       Physical Exam     Physical Exam  Constitutional:       Appearance: Normal appearance  She is normal weight  HENT:      Head: Normocephalic and atraumatic  Eyes:      General:         Right eye: No discharge  Left eye: No discharge  Conjunctiva/sclera: Conjunctivae normal    Pulmonary:      Effort: Pulmonary effort is normal  No respiratory distress  Skin:     General: Skin is warm and dry  Coloration: Skin is not pale  Findings: No erythema or rash  Neurological:      General: No focal deficit present  Mental Status: She is alert and oriented to person, place, and time     Psychiatric:         Mood and Affect: Mood normal          Behavior: Behavior normal            Vital Signs     Vitals:    08/18/20 0949   BP: 122/76   BP Location: Right arm   Patient Position: Sitting   Cuff Size: Adult   Pulse: 60   Resp: 18   Temp: (!) 97 3 °F (36 3 °C)   Weight: 57 2 kg (126 lb)   Height: 5' 2" (1 575 m) Current Medications       Current Outpatient Medications:     alendronate (FOSAMAX) 70 mg tablet, TAKE ONE TABLET BY MOUTH ONCE EVERY WEEK, Disp: 12 tablet, Rfl: 3    ezetimibe-simvastatin (VYTORIN) 10-20 mg per tablet, TAKE ONE TABLET BY MOUTH EVERY DAY AT BEDTIME, Disp: 90 tablet, Rfl: 3    hydrochlorothiazide (HYDRODIURIL) 25 mg tablet, TAKE ONE TABLET BY MOUTH EVERY DAY (GENERIC HYDRODIURIL), Disp: 90 tablet, Rfl: 3    metoprolol tartrate (LOPRESSOR) 25 mg tablet, TAKE ONE TABLET BY MOUTH TWICE A DAY, Disp: 90 tablet, Rfl: 3    Xarelto 20 MG tablet, TAKE ONE TABLET BY MOUTH EVERY DAY WITH DINNER, Disp: 30 tablet, Rfl: 5    mupirocin (BACTROBAN) 2 % cream, Apply topically 3 (three) times a day x7days (Patient not taking: Reported on 5/7/2020), Disp: 15 g, Rfl: 0    valACYclovir (VALTREX) 500 mg tablet, Take 1 tablet (500 mg total) by mouth 3 (three) times a day for 7 days, Disp: 21 tablet, Rfl: 0      Active Problems     Patient Active Problem List   Diagnosis    Benign essential hypertension    Other hyperlipidemia    Macular degeneration    Post-menopausal osteoporosis    Pleural effusion on right    Mediastinal adenopathy    Renal cyst    Thrombocytosis (HCC)    Paroxysmal atrial fibrillation (HCC)    Centrilobular emphysema (HCC)    Lung nodule    Chronic diastolic congestive heart failure (HCC)         Past Medical History     Past Medical History:   Diagnosis Date    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[facility] [address]     Community acquired pneumonia     last assessed: 10/11/16    Hyperlipidemia     Hypertension     Irregular heart beat     Kidney stone     Lung abnormality          Surgical History     Past Surgical History:   Procedure Laterality Date    APPENDECTOMY      BOWEL RESECTION      CATARACT EXTRACTION EXTRACAPSULAR W/ INTRAOCULAR LENS IMPLANTATION Bilateral     COLECTOMY      Partial    COLON SURGERY      colon cancer    COLONOSCOPY N/A 8/5/2016    Procedure: COLONOSCOPY; Surgeon: Ginette Royal MD;  Location: [facility] GI LAB;   Service:    [facility] EYE SURGERY      HYSTERECTOMY      THROAT SURGERY      TRACHEAL SURGERY      tumor removed benign    TUMOR REMOVAL N/A 2000    trachea         Family History     Family History   Problem Relation Age of Onset    Diabetes Sister     Hypertension Sister     Stroke Mother     Hypertension Mother          Social History     Social History       Radiology

## 2020-09-22 ENCOUNTER — OFFICE VISIT (OUTPATIENT)
Dept: PULMONOLOGY | Facility: MEDICAL CENTER | Age: 85
End: 2020-09-22
Payer: MEDICARE

## 2020-09-22 VITALS
DIASTOLIC BLOOD PRESSURE: 64 MMHG | WEIGHT: 127 LBS | OXYGEN SATURATION: 95 % | BODY MASS INDEX: 23.37 KG/M2 | RESPIRATION RATE: 12 BRPM | HEIGHT: 62 IN | HEART RATE: 64 BPM | SYSTOLIC BLOOD PRESSURE: 102 MMHG

## 2020-09-22 DIAGNOSIS — R06.00 DYSPNEA ON EXERTION: ICD-10-CM

## 2020-09-22 DIAGNOSIS — J43.2 CENTRILOBULAR EMPHYSEMA (HCC): Primary | ICD-10-CM

## 2020-09-22 PROBLEM — R06.09 DYSPNEA ON EXERTION: Status: ACTIVE | Noted: 2020-09-22

## 2020-09-22 PROCEDURE — 99213 OFFICE O/P EST LOW 20 MIN: CPT | Performed by: INTERNAL MEDICINE

## 2020-09-22 NOTE — PROGRESS NOTES
Assessment/Plan        Problem List Items Addressed This Visit        Respiratory    Centrilobular emphysema ([facility] [address] - Primary     Moderate COPD which is stable  Last measured FEV1 was 1 06 L or 59% of predicted  She is not having any worsening shortness of breath and is not using her nebulizer with DuoNeb as she does not feel like she needs it  She did not want any type of maintenance inhaler or rescue inhaler  Overall she states she is doing well  Other    Dyspnea on exertion     She only has mild exertional dyspnea  She denies any wheezing  She states her breathing has been stable since last visit  She does have 13 steps to get into her house but otherwise her living arrangements are on 1 floor  Shortness of Breath (fine); Cough (dry   occasional  ); and Wheezing (occasional)      PIEDAD Shea has moderate COPD with FEV1 of 1 06 L or 59% of predicted  She also has history of a 13 mm peripheral right upper lobe nodular density and last CT scan was done December 2, 2019 with no change  She denies any weight loss is not having any cough  She has some mild exertional dyspnea has no wheezing  She states she does not use her nebulizer DuoNeb as she does not feel like she needs it  She also does not want any type of rescue inhaler to keep on hand  She does have history of paroxysmal atrial fibrillation and does take Xarelto  No weight loss  She does take Fosamax for osteoporosis      Past Medical History:   Diagnosis Date    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[facility] [address]     Community acquired pneumonia     last assessed: 10/11/16    Hyperlipidemia     Hypertension     Irregular heart beat     Kidney stone     Lung abnormality        Past Surgical History:   Procedure Laterality Date    APPENDECTOMY      BOWEL RESECTION      CATARACT EXTRACTION EXTRACAPSULAR W/ INTRAOCULAR LENS IMPLANTATION Bilateral     COLECTOMY      Partial    COLON SURGERY      colon cancer    COLONOSCOPY N/A 8/5/2016    Procedure: COLONOSCOPY;  Surgeon: Allison Rizzo MD;  Location: [facility] GI LAB; Service:     EYE SURGERY      HYSTERECTOMY      THROAT SURGERY      TRACHEAL SURGERY      tumor removed benign    TUMOR REMOVAL N/A 2000    trachea         Current Outpatient Medications:     alendronate (FOSAMAX) 70 mg tablet, TAKE ONE TABLET BY MOUTH ONCE EVERY WEEK, Disp: 12 tablet, Rfl: 3    ezetimibe-simvastatin (VYTORIN) 10-20 mg per tablet, TAKE ONE TABLET BY MOUTH EVERY DAY AT BEDTIME, Disp: 90 tablet, Rfl: 3    metoprolol tartrate (LOPRESSOR) 25 mg tablet, TAKE ONE TABLET BY MOUTH TWICE A DAY, Disp: 90 tablet, Rfl: 3    Xarelto 20 MG tablet, TAKE ONE TABLET BY MOUTH EVERY DAY WITH DINNER, Disp: 30 tablet, Rfl: 5    hydrochlorothiazide (HYDRODIURIL) 25 mg tablet, TAKE ONE TABLET BY MOUTH EVERY DAY (GENERIC HYDRODIURIL), Disp: 90 tablet, Rfl: 3    mupirocin (BACTROBAN) 2 % cream, Apply topically 3 (three) times a day x7days (Patient not taking: Reported on 5/7/2020), Disp: 15 g, Rfl: 0    valACYclovir (VALTREX) 500 mg tablet, Take 1 tablet (500 mg total) by mouth 3 (three) times a day for 7 days, Disp: 21 tablet, Rfl: 0    Allergies   Allergen Reactions    Azithromycin     Niacin And Related     Keflex [Cephalexin] Rash       Social History     Tobacco Use    Smoking status: Never Smoker    Smokeless tobacco: Never Used   Substance Use Topics    Alcohol use: No         Family History   Problem Relation Age of Onset    Diabetes Sister     Hypertension Sister     Stroke Mother     Hypertension Mother        Review of Systems   Constitutional: Negative for chills, fever and unexpected weight change  HENT: Negative for congestion, rhinorrhea and sore throat  Eyes: Negative for discharge and redness  Respiratory: Negative for cough and wheezing  Has only mild shortness of breath activity  Cardiovascular: Negative for chest pain, palpitations and leg swelling     Gastrointestinal: Negative for abdominal distention, abdominal pain and nausea  Endocrine: Negative for polydipsia and polyphagia  Genitourinary: Negative for dysuria  Musculoskeletal: Negative for joint swelling and myalgias  Skin: Negative for rash  Neurological: Negative for light-headedness  Psychiatric/Behavioral: Negative for decreased concentration  Vitals:    09/22/20 1046   BP: 102/64   Pulse: 64   Resp: 12   SpO2: 95%           Physical Exam  Constitutional:       General: She is not in acute distress  Appearance: She is well-developed  HENT:      Head: Normocephalic  Nose: Nose normal       Mouth/Throat:      Pharynx: No oropharyngeal exudate  Eyes:      Conjunctiva/sclera: Conjunctivae normal       Pupils: Pupils are equal, round, and reactive to light  Neck:      Musculoskeletal: Neck supple  Thyroid: No thyromegaly  Vascular: No JVD  Cardiovascular:      Rate and Rhythm: Normal rate and regular rhythm  Heart sounds: Normal heart sounds  Pulmonary:      Effort: Pulmonary effort is normal       Comments: Lung sounds are clear, no wheezes crackles or rhonchi  Abdominal:      General: There is no distension  Palpations: Abdomen is soft  Tenderness: There is no abdominal tenderness  Musculoskeletal:      Comments: No edema, cyanosis or clubbing   Lymphadenopathy:      Cervical: No cervical adenopathy  Skin:     General: Skin is warm and dry  Neurological:      Mental Status: She is alert and oriented to person, place, and time

## 2020-09-22 NOTE — ASSESSMENT & PLAN NOTE
She only has mild exertional dyspnea  She denies any wheezing  She states her breathing has been stable since last visit  She does have 13 steps to get into her house but otherwise her living arrangements are on 1 floor

## 2020-09-22 NOTE — ASSESSMENT & PLAN NOTE
Moderate COPD which is stable  Last measured FEV1 was 1 06 L or 59% of predicted  She is not having any worsening shortness of breath and is not using her nebulizer with DuoNeb as she does not feel like she needs it  She did not want any type of maintenance inhaler or rescue inhaler  Overall she states she is doing well

## 2020-10-15 ENCOUNTER — OFFICE VISIT (OUTPATIENT)
Dept: CARDIOLOGY CLINIC | Facility: CLINIC | Age: 85
End: 2020-10-15
Payer: MEDICARE

## 2020-10-15 VITALS
WEIGHT: 125 LBS | DIASTOLIC BLOOD PRESSURE: 70 MMHG | BODY MASS INDEX: 23 KG/M2 | SYSTOLIC BLOOD PRESSURE: 134 MMHG | TEMPERATURE: 98.2 F | HEIGHT: 62 IN | OXYGEN SATURATION: 93 % | HEART RATE: 71 BPM

## 2020-10-15 DIAGNOSIS — I10 BENIGN ESSENTIAL HYPERTENSION: ICD-10-CM

## 2020-10-15 DIAGNOSIS — I50.32 CHRONIC DIASTOLIC CONGESTIVE HEART FAILURE (HCC): ICD-10-CM

## 2020-10-15 DIAGNOSIS — E78.49 OTHER HYPERLIPIDEMIA: ICD-10-CM

## 2020-10-15 DIAGNOSIS — I48.0 PAROXYSMAL ATRIAL FIBRILLATION (HCC): Primary | ICD-10-CM

## 2020-10-15 DIAGNOSIS — I48.91 ATRIAL FIBRILLATION WITH RAPID VENTRICULAR RESPONSE (HCC): ICD-10-CM

## 2020-10-15 PROCEDURE — 99214 OFFICE O/P EST MOD 30 MIN: CPT | Performed by: INTERNAL MEDICINE

## 2020-10-15 PROCEDURE — 93000 ELECTROCARDIOGRAM COMPLETE: CPT | Performed by: INTERNAL MEDICINE

## 2020-12-03 ENCOUNTER — OFFICE VISIT (OUTPATIENT)
Dept: FAMILY MEDICINE CLINIC | Facility: CLINIC | Age: 85
End: 2020-12-03
Payer: MEDICARE

## 2020-12-03 ENCOUNTER — CLINICAL SUPPORT (OUTPATIENT)
Dept: FAMILY MEDICINE CLINIC | Facility: CLINIC | Age: 85
End: 2020-12-03
Payer: MEDICARE

## 2020-12-03 VITALS
HEART RATE: 67 BPM | TEMPERATURE: 98.3 F | HEIGHT: 62 IN | WEIGHT: 125.8 LBS | DIASTOLIC BLOOD PRESSURE: 60 MMHG | OXYGEN SATURATION: 91 % | SYSTOLIC BLOOD PRESSURE: 118 MMHG | RESPIRATION RATE: 16 BRPM | BODY MASS INDEX: 23.15 KG/M2

## 2020-12-03 DIAGNOSIS — K62.3 RECTAL PROLAPSE: Primary | ICD-10-CM

## 2020-12-03 DIAGNOSIS — R31.9 HEMATURIA, UNSPECIFIED TYPE: ICD-10-CM

## 2020-12-03 DIAGNOSIS — Z23 NEED FOR INFLUENZA VACCINATION: Primary | ICD-10-CM

## 2020-12-03 LAB
SL AMB  POCT GLUCOSE, UA: ABNORMAL
SL AMB LEUKOCYTE ESTERASE,UA: 500
SL AMB POCT BILIRUBIN,UA: ABNORMAL
SL AMB POCT BLOOD,UA: 50
SL AMB POCT CLARITY,UA: ABNORMAL
SL AMB POCT COLOR,UA: YELLOW
SL AMB POCT KETONES,UA: ABNORMAL
SL AMB POCT NITRITE,UA: ABNORMAL
SL AMB POCT PH,UA: 7
SL AMB POCT SPECIFIC GRAVITY,UA: 1.01
SL AMB POCT URINE PROTEIN: ABNORMAL
SL AMB POCT UROBILINOGEN: ABNORMAL

## 2020-12-03 PROCEDURE — 90662 IIV NO PRSV INCREASED AG IM: CPT

## 2020-12-03 PROCEDURE — 99213 OFFICE O/P EST LOW 20 MIN: CPT | Performed by: FAMILY MEDICINE

## 2020-12-03 PROCEDURE — G0008 ADMIN INFLUENZA VIRUS VAC: HCPCS

## 2020-12-03 PROCEDURE — 81002 URINALYSIS NONAUTO W/O SCOPE: CPT | Performed by: FAMILY MEDICINE

## 2020-12-08 LAB
BACTERIA UR CULT: ABNORMAL
Lab: ABNORMAL
SL AMB ANTIMICROBIAL SUSCEPTIBILITY: ABNORMAL

## 2020-12-10 DIAGNOSIS — N39.0 URINARY TRACT INFECTION WITHOUT HEMATURIA, SITE UNSPECIFIED: Primary | ICD-10-CM

## 2020-12-10 RX ORDER — SULFAMETHOXAZOLE AND TRIMETHOPRIM 800; 160 MG/1; MG/1
1 TABLET ORAL 2 TIMES DAILY
Qty: 6 TABLET | Refills: 0 | Status: SHIPPED | OUTPATIENT
Start: 2020-12-10 | End: 2020-12-13

## 2020-12-26 ENCOUNTER — TELEPHONE (OUTPATIENT)
Dept: FAMILY MEDICINE CLINIC | Facility: CLINIC | Age: 85
End: 2020-12-26

## 2020-12-29 DIAGNOSIS — I10 BENIGN ESSENTIAL HYPERTENSION: ICD-10-CM

## 2020-12-29 RX ORDER — HYDROCHLOROTHIAZIDE 25 MG/1
TABLET ORAL
Qty: 90 TABLET | Refills: 0 | Status: SHIPPED | OUTPATIENT
Start: 2020-12-29 | End: 2021-04-06

## 2021-01-11 ENCOUNTER — TELEMEDICINE (OUTPATIENT)
Dept: FAMILY MEDICINE CLINIC | Facility: CLINIC | Age: 86
End: 2021-01-11
Payer: MEDICARE

## 2021-01-11 DIAGNOSIS — Z00.00 MEDICARE ANNUAL WELLNESS VISIT, SUBSEQUENT: Primary | ICD-10-CM

## 2021-01-11 DIAGNOSIS — E78.49 OTHER HYPERLIPIDEMIA: ICD-10-CM

## 2021-01-11 PROCEDURE — 1123F ACP DISCUSS/DSCN MKR DOCD: CPT | Performed by: FAMILY MEDICINE

## 2021-01-11 PROCEDURE — G0439 PPPS, SUBSEQ VISIT: HCPCS | Performed by: FAMILY MEDICINE

## 2021-01-11 NOTE — PROGRESS NOTES
Assessment and Plan:     Problem List Items Addressed This Visit     None           Preventive health issues were discussed with patient, and age appropriate screening tests were ordered as noted in patient's After Visit Summary  Personalized health advice and appropriate referrals for health education or preventive services given if needed, as noted in patient's After Visit Summary  History of Present Illness:     Patient presents for Medicare Annual Wellness visit    Patient Care Team:  Ronaldo Gtz MD as PCP - General  MD Wiley Levin MD as Endoscopist     Problem List:     Patient Active Problem List   Diagnosis    Benign essential hypertension    Other hyperlipidemia    Macular degeneration    Post-menopausal osteoporosis    Pleural effusion on right    Mediastinal adenopathy    Renal cyst    Thrombocytosis ([address]    Paroxysmal atrial fibrillation ([address]    Centrilobular emphysema ([address]    Lung nodule    Chronic diastolic congestive heart failure ([address] )    Dyspnea on exertion      Past Medical and Surgical History:     Past Medical History:   Diagnosis Date    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[address] )     Community acquired pneumonia     last assessed: 10/11/16    Hyperlipidemia     Hypertension     Irregular heart beat     Kidney stone     Lung abnormality      Past Surgical History:   Procedure Laterality Date    APPENDECTOMY      BOWEL RESECTION      CATARACT EXTRACTION EXTRACAPSULAR W/ INTRAOCULAR LENS IMPLANTATION Bilateral     COLECTOMY      Partial    COLON SURGERY      colon cancer    COLONOSCOPY N/A 8/5/2016    Procedure: COLONOSCOPY;  Surgeon: Wiley Matthews MD;  Location: [facility];   Service:    [facility] EYE SURGERY      HYSTERECTOMY      THROAT SURGERY      TRACHEAL SURGERY      tumor removed benign    TUMOR REMOVAL N/A 2000    trachea      Family History:     Family History   Problem Relation Age of Onset    Diabetes Sister     Hypertension Sister     Stroke Mother     Hypertension Mother       Social History:        Social History     Socioeconomic History    Marital status:       Spouse name: Not on file    Number of children: Not on file    Years of education: Not on file    Highest education level: Not on file   Occupational History    Not on file   Social Needs    Financial resource strain: Not on file    Food insecurity     Worry: Not on file     Inability: Not on file    Transportation needs     Medical: Not on file     Non-medical: Not on file   Tobacco Use    Smoking status: Never Smoker    Smokeless tobacco: Never Used   Substance and Sexual Activity    Alcohol use: No    Drug use: No    Sexual activity: Not on file     Comment: NOT ASKED   Lifestyle    Physical activity     Days per week: Not on file     Minutes per session: Not on file    Stress: Not on file   Relationships    Social connections     Talks on phone: Not on file     Gets together: Not on file     Attends Latter day service: Not on file     Active member of club or organization: Not on file     Attends meetings of clubs or organizations: Not on file     Relationship status: Not on file    Intimate partner violence     Fear of current or ex partner: Not on file     Emotionally abused: Not on file     Physically abused: Not on file     Forced sexual activity: Not on file   Other Topics Concern    Not on file   Social History Narrative    Daily tea consumption ( 1 cups/day)      Medications and Allergies:     Current Outpatient Medications   Medication Sig Dispense Refill    alendronate (FOSAMAX) 70 mg tablet TAKE ONE TABLET BY MOUTH ONCE EVERY WEEK 12 tablet 3    ezetimibe-simvastatin (VYTORIN) 10-20 mg per tablet TAKE ONE TABLET BY MOUTH EVERY DAY AT BEDTIME 90 tablet 3    hydrochlorothiazide (HYDRODIURIL) 25 mg tablet TAKE ONE TABLET BY MOUTH EVERY DAY (GENERIC HYDODIURIL) 90 tablet 0    metoprolol tartrate (LOPRESSOR) 25 mg tablet Take 1 tablet (25 mg total) by mouth 2 (two) times a day 180 tablet 3    Xarelto 20 MG tablet TAKE ONE TABLET BY MOUTH EVERY DAY WITH DINNER 30 tablet 5     No current facility-administered medications for this visit  Allergies   Allergen Reactions    Azithromycin     Niacin And Related     Keflex [Cephalexin] Rash      Immunizations:     Immunization History   Administered Date(s) Administered    Influenza Split High Dose Preservative Free IM 10/09/2012, 11/03/2014, 10/11/2016, 10/13/2017    Influenza, high dose seasonal 0 7 mL 10/15/2018, 09/16/2019, 12/03/2020    Influenza, seasonal, injectable 10/18/2006, 10/10/2007, 10/31/2008, 10/12/2010, 10/10/2011, 12/27/2013    Pneumococcal Conjugate 13-Valent 06/29/2015    Pneumococcal Polysaccharide PPV23 06/24/2013    Tdap 04/05/2007      Health Maintenance:         Topic Date Due    Colonoscopy Surveillance  08/05/2021         Topic Date Due    DTaP,Tdap,and Td Vaccines (2 - Td) 04/05/2017      Medicare Health Risk Assessment:     There were no vitals taken for this visit  Chrystal Howard is here for her Subsequent Wellness visit  Health Risk Assessment:   Patient rates overall health as good  Patient feels that their physical health rating is same  Eyesight was rated as same  Hearing was rated as same  Patient feels that their emotional and mental health rating is same  Pain experienced in the last 7 days has been none  Patient states that she has experienced no weight loss or gain in last 6 months  Depression Screening:   PHQ-2 Score: 0      Fall Risk Screening: In the past year, patient has experienced: history of falling in past year    Number of falls: 1  Injured during fall?: No    Feels unsteady when standing or walking?: No    Worried about falling?: No      Home Safety:  Patient does not have trouble with stairs inside or outside of their home  Patient has working smoke alarms and has working carbon monoxide detector  Home safety hazards include: none       Nutrition: Current diet is Regular and No Added Salt  Medications:   Patient is currently taking over-the-counter supplements  OTC medications include: see medication list  Patient is able to manage medications  Activities of Daily Living (ADLs)/Instrumental Activities of Daily Living (IADLs):   Walk and transfer into and out of bed and chair?: Yes  Dress and groom yourself?: Yes    Bathe or shower yourself?: Yes    Feed yourself? Yes  Do your laundry/housekeeping?: Yes  Manage your money, pay your bills and track your expenses?: No  Make your own meals?: Yes    Do your own shopping?: No    Previous Hospitalizations:   Any hospitalizations or ED visits within the last 12 months?: No      Advance Care Planning:   Living will: Yes    Durable POA for healthcare:  Yes    Advanced directive: Yes      Cognitive Screening:   Provider or family/friend/caregiver concerned regarding cognition?: No    PREVENTIVE SCREENINGS      Cardiovascular Screening:    General: Screening Not Indicated and History Lipid Disorder    Due for: Lipid Panel      Diabetes Screening:       Due for: Blood Glucose      Colorectal Cancer Screening:     General: Screening Not Indicated      Breast Cancer Screening:     General: Screening Not Indicated      Cervical Cancer Screening:    General: Screening Not Indicated      Osteoporosis Screening:    General: Screening Not Indicated and History Osteoporosis      Abdominal Aortic Aneurysm (AAA) Screening:        General: Screening Not Indicated      Lung Cancer Screening:     General: Screening Not Indicated      Hepatitis C Screening:    General: Screening Not Indicated      Caitlin Guillen MD

## 2021-01-11 NOTE — PATIENT INSTRUCTIONS
Medicare Preventive Visit Patient Instructions  Thank you for completing your Welcome to Medicare Visit or Medicare Annual Wellness Visit today  Your next wellness visit will be due in one year (1/11/2022)  The screening/preventive services that you may require over the next 5-10 years are detailed below  Some tests may not apply to you based off risk factors and/or age  Screening tests ordered at today's visit but not completed yet may show as past due  Also, please note that scanned in results may not display below  Preventive Screenings:  Service Recommendations Previous Testing/Comments   Colorectal Cancer Screening  * Colonoscopy    * Fecal Occult Blood Test (FOBT)/Fecal Immunochemical Test (FIT)  * Fecal DNA/Cologuard Test  * Flexible Sigmoidoscopy Age: 54-65 years old   Colonoscopy: every 10 years (may be performed more frequently if at higher risk)  OR  FOBT/FIT: every 1 year  OR  Cologuard: every 3 years  OR  Sigmoidoscopy: every 5 years  Screening may be recommended earlier than age 48 if at higher risk for colorectal cancer  Also, an individualized decision between you and your healthcare provider will decide whether screening between the ages of 74-80 would be appropriate  Colonoscopy: 11/19/2013  FOBT/FIT: Not on file  Cologuard: Not on file  Sigmoidoscopy: Not on file         Breast Cancer Screening Age: 36 years old  Frequency: every 1-2 years  Not required if history of left and right mastectomy Mammogram: Not on file       Cervical Cancer Screening Between the ages of 21-29, pap smear recommended once every 3 years  Between the ages of 33-67, can perform pap smear with HPV co-testing every 5 years     Recommendations may differ for women with a history of total hysterectomy, cervical cancer, or abnormal pap smears in past  Pap Smear: Not on file       Hepatitis C Screening Once for adults born between [facility]  More frequently in patients at high risk for Hepatitis C Hep C Antibody: Not on file       Diabetes Screening 1-2 times per year if you're at risk for diabetes or have pre-diabetes Fasting glucose: 107 mg/dL   A1C: No results in last 5 years       Cholesterol Screening Once every 5 years if you don't have a lipid disorder  May order more often based on risk factors  Lipid panel: 11/05/2019         Other Preventive Screenings Covered by Medicare:  1  Abdominal Aortic Aneurysm (AAA) Screening: covered once if your at risk  You're considered to be at risk if you have a family history of AAA  2  Lung Cancer Screening: covers low dose CT scan once per year if you meet all of the following conditions: (1) Age 50-69; (2) No signs or symptoms of lung cancer; (3) Current smoker or have quit smoking within the last 15 years; (4) You have a tobacco smoking history of at least 30 pack years (packs per day multiplied by number of years you smoked); (5) You get a written order from a healthcare provider  3  Glaucoma Screening: covered annually if you're considered high risk: (1) You have diabetes OR (2) Family history of glaucoma OR (3)  aged 48 and older OR (3)  American aged 72 and older  3  Osteoporosis Screening: covered every 2 years if you meet one of the following conditions: (1) You're estrogen deficient and at risk for osteoporosis based off medical history and other findings; (2) Have a vertebral abnormality; (3) On glucocorticoid therapy for more than 3 months; (4) Have primary hyperparathyroidism; (5) On osteoporosis medications and need to assess response to drug therapy  · Last bone density test (DXA Scan): 05/01/2018  5  HIV Screening: covered annually if you're between the age of 12-76  Also covered annually if you are younger than 13 and older than 72 with risk factors for HIV infection  For pregnant patients, it is covered up to 3 times per pregnancy      Immunizations:  Immunization Recommendations   Influenza Vaccine Annual influenza vaccination during flu season is recommended for all persons aged >= 6 months who do not have contraindications   Pneumococcal Vaccine (Prevnar and Pneumovax)  * Prevnar = PCV13  * Pneumovax = PPSV23   Adults 25-60 years old: 1-3 doses may be recommended based on certain risk factors  Adults 72 years old: Prevnar (PCV13) vaccine recommended followed by Pneumovax (PPSV23) vaccine  If already received PPSV23 since turning 65, then PCV13 recommended at least one year after PPSV23 dose  Hepatitis B Vaccine 3 dose series if at intermediate or high risk (ex: diabetes, end stage renal disease, liver disease)   Tetanus (Td) Vaccine - COST NOT COVERED BY MEDICARE PART B Following completion of primary series, a booster dose should be given every 10 years to maintain immunity against tetanus  Td may also be given as tetanus wound prophylaxis  Tdap Vaccine - COST NOT COVERED BY MEDICARE PART B Recommended at least once for all adults  For pregnant patients, recommended with each pregnancy  Shingles Vaccine (Shingrix) - COST NOT COVERED BY MEDICARE PART B  2 shot series recommended in those aged 48 and above     Health Maintenance Due:      Topic Date Due    Colonoscopy Surveillance  08/05/2021     Immunizations Due:      Topic Date Due    DTaP,Tdap,and Td Vaccines (2 - Td) 04/05/2017     Advance Directives   What are advance directives? Advance directives are legal documents that state your wishes and plans for medical care  These plans are made ahead of time in case you lose your ability to make decisions for yourself  Advance directives can apply to any medical decision, such as the treatments you want, and if you want to donate organs  What are the types of advance directives? There are many types of advance directives, and each state has rules about how to use them  You may choose a combination of any of the following:  · Living will: This is a written record of the treatment you want   You can also choose which treatments you do not want, which to limit, and which to stop at a certain time  This includes surgery, medicine, IV fluid, and tube feedings  · Durable power of  for healthcare [facility]): This is a written record that states who you want to make healthcare choices for you when you are unable to make them for yourself  This person, called a proxy, is usually a family member or a friend  You may choose more than 1 proxy  · Do not resuscitate (DNR) order:  A DNR order is used in case your heart stops beating or you stop breathing  It is a request not to have certain forms of treatment, such as CPR  A DNR order may be included in other types of advance directives  · Medical directive: This covers the care that you want if you are in a coma, near death, or unable to make decisions for yourself  You can list the treatments you want for each condition  Treatment may include pain medicine, surgery, blood transfusions, dialysis, IV or tube feedings, and a ventilator (breathing machine)  · Values history: This document has questions about your views, beliefs, and how you feel and think about life  This information can help others choose the care that you would choose  Why are advance directives important? An advance directive helps you control your care  Although spoken wishes may be used, it is better to have your wishes written down  Spoken wishes can be misunderstood, or not followed  Treatments may be given even if you do not want them  An advance directive may make it easier for your family to make difficult choices about your care  Urinary Incontinence   Urinary incontinence (UI)  is when you lose control of your bladder  UI develops because your bladder cannot store or empty urine properly  The 3 most common types of UI are stress incontinence, urge incontinence, or both  Medicines:   · May be given to help strengthen your bladder control  Report any side effects of medication to your healthcare provider    Do pelvic muscle exercises often:  Your pelvic muscles help you stop urinating  Squeeze these muscles tight for 5 seconds, then relax for 5 seconds  Gradually work up to squeezing for 10 seconds  Do 3 sets of 15 repetitions a day, or as directed  This will help strengthen your pelvic muscles and improve bladder control  Train your bladder:  Go to the bathroom at set times, such as every 2 hours, even if you do not feel the urge to go  You can also try to hold your urine when you feel the urge to go  For example, hold your urine for 5 minutes when you feel the urge to go  As that becomes easier, hold your urine for 10 minutes  Self-care:   · Keep a UI record  Write down how often you leak urine and how much you leak  Make a note of what you were doing when you leaked urine  · Drink liquids as directed  You may need to limit the amount of liquid you drink to help control your urine leakage  Do not drink any liquid right before you go to bed  Limit or do not have drinks that contain caffeine or alcohol  · Prevent constipation  Eat a variety of high-fiber foods  Good examples are high-fiber cereals, beans, vegetables, and whole-grain breads  Walking is the best way to trigger your intestines to have a bowel movement  · Exercise regularly and maintain a healthy weight  Weight loss and exercise will decrease pressure on your bladder and help you control your leakage  · Use a catheter as directed  to help empty your bladder  A catheter is a tiny, plastic tube that is put into your bladder to drain your urine  · Go to behavior therapy as directed  Behavior therapy may be used to help you learn to control your urge to urinate  © Copyright [employer] 2018 Information is for End User's use only and may not be sold, redistributed or otherwise used for commercial purposes   All illustrations and images included in CareNotes® are the copyrighted property of [employer]  or [employer]

## 2021-02-09 DIAGNOSIS — I48.91 ATRIAL FIBRILLATION WITH RAPID VENTRICULAR RESPONSE (HCC): ICD-10-CM

## 2021-02-10 RX ORDER — RIVAROXABAN 20 MG/1
TABLET, FILM COATED ORAL
Qty: 30 TABLET | Refills: 5 | Status: SHIPPED | OUTPATIENT
Start: 2021-02-10 | End: 2021-04-21

## 2021-02-12 ENCOUNTER — IMMUNIZATIONS (OUTPATIENT)
Dept: FAMILY MEDICINE CLINIC | Facility: HOSPITAL | Age: 86
End: 2021-02-12

## 2021-02-12 DIAGNOSIS — Z23 ENCOUNTER FOR IMMUNIZATION: Primary | ICD-10-CM

## 2021-02-12 PROCEDURE — 0011A SARS-COV-2 / COVID-19 MRNA VACCINE (MODERNA) 100 MCG: CPT

## 2021-02-12 PROCEDURE — 91301 SARS-COV-2 / COVID-19 MRNA VACCINE (MODERNA) 100 MCG: CPT

## 2021-03-12 ENCOUNTER — IMMUNIZATIONS (OUTPATIENT)
Dept: FAMILY MEDICINE CLINIC | Facility: HOSPITAL | Age: 86
End: 2021-03-12

## 2021-03-12 DIAGNOSIS — Z23 ENCOUNTER FOR IMMUNIZATION: Primary | ICD-10-CM

## 2021-03-12 PROCEDURE — 0012A SARS-COV-2 / COVID-19 MRNA VACCINE (MODERNA) 100 MCG: CPT

## 2021-03-12 PROCEDURE — 91301 SARS-COV-2 / COVID-19 MRNA VACCINE (MODERNA) 100 MCG: CPT

## 2021-04-06 ENCOUNTER — OFFICE VISIT (OUTPATIENT)
Dept: CARDIOLOGY CLINIC | Facility: CLINIC | Age: 86
End: 2021-04-06
Payer: MEDICARE

## 2021-04-06 VITALS
DIASTOLIC BLOOD PRESSURE: 68 MMHG | WEIGHT: 125.7 LBS | SYSTOLIC BLOOD PRESSURE: 110 MMHG | HEIGHT: 62 IN | OXYGEN SATURATION: 94 % | TEMPERATURE: 97.6 F | BODY MASS INDEX: 23.13 KG/M2

## 2021-04-06 DIAGNOSIS — I50.32 CHRONIC DIASTOLIC CONGESTIVE HEART FAILURE (HCC): ICD-10-CM

## 2021-04-06 DIAGNOSIS — I10 BENIGN ESSENTIAL HYPERTENSION: ICD-10-CM

## 2021-04-06 DIAGNOSIS — I48.0 PAROXYSMAL ATRIAL FIBRILLATION (HCC): Primary | ICD-10-CM

## 2021-04-06 PROCEDURE — 99214 OFFICE O/P EST MOD 30 MIN: CPT | Performed by: INTERNAL MEDICINE

## 2021-04-06 NOTE — PROGRESS NOTES
Cardiology Follow Up    Demetrice Ewing  1933  [id number]      Interval History: Demetrice Ewing is here for follow up of atrial fibrillation  Since her last visit, she has been feeling well   she denies any palpitations, chest pain, shortness of breath, LE edema, orthopnea or PND  Diet is overall unchanged  There has not been a significant change in weight  She has occasional hemmorhoid bleeding but overall is feeling well  No recent atrial fibrillation episodes  Her last episode occurred in January 2019, she developed shortness of breath secondary to pneumonia  Complicating pneumonia was atrial fibrillation and pleural effusion  She converted to sinus rhythm spontaneously and was started on Xarelto afterwards  Echocardiogram showed normal ejection fraction with diastolic dysfunction  She was treated with IV Lasix  The following portions of the patient's history were reviewed and updated as appropriate: She  has a past medical history of 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[facility] [address] ), Community acquired pneumonia, Hyperlipidemia, Hypertension, Irregular heart beat, Kidney stone, and Lung abnormality  She  has a past surgical history that includes Hysterectomy; Tumor removal (N/A, 2000); Colon surgery; Bowel resection; Cataract extraction, extracapsular w/ intraocular lens implant (Bilateral); Tracheal surgery; Appendectomy; Colonoscopy (N/A, 8/5/2016); Colectomy; Throat surgery; and Eye surgery  Her family history includes Diabetes in her sister; Hypertension in her mother and sister; Stroke in her mother  She  reports that she has never smoked  She has never used smokeless tobacco  She reports that she does not drink alcohol or use drugs    Current Outpatient Medications   Medication Sig Dispense Refill    alendronate (FOSAMAX) 70 mg tablet TAKE ONE TABLET BY MOUTH ONCE EVERY WEEK 12 tablet 3    ezetimibe-simvastatin (VYTORIN) 10-20 mg per tablet TAKE ONE TABLET BY MOUTH EVERY DAY AT BEDTIME 90 tablet 3    hydrochlorothiazide (HYDRODIURIL) 25 mg tablet TAKE ONE TABLET BY MOUTH EVERY DAY (GENERIC HYDODIURIL) 90 tablet 0    metoprolol tartrate (LOPRESSOR) 25 mg tablet Take 1 tablet (25 mg total) by mouth 2 (two) times a day 180 tablet 3    Xarelto 20 MG tablet TAKE ONE TABLET BY MOUTH ONCE DAILY - TAKE WITH DINNER 30 tablet 5     No current facility-administered medications for this visit  She is allergic to azithromycin; niacin and related; and keflex [cephalexin]         Review of Systems:  Review of Systems   Constitutional: Positive for fatigue  Negative for chills and fever  HENT: Negative for ear pain and sore throat  Eyes: Negative for pain and visual disturbance  Respiratory: Negative for cough and shortness of breath  Cardiovascular: Negative for chest pain, palpitations and leg swelling  Gastrointestinal: Positive for anal bleeding and constipation  Negative for abdominal pain and vomiting  Genitourinary: Negative for dysuria and hematuria  Musculoskeletal: Positive for arthralgias  Negative for back pain  Skin: Negative for color change and rash  Neurological: Negative for seizures and syncope  All other systems reviewed and are negative  Physical Exam:  /68 (BP Location: Left arm, Patient Position: Sitting, Cuff Size: Standard)   Temp 97 6 °F (36 4 °C) (Temporal)   Ht 5' 2" (1 575 m)   Wt 57 kg (125 lb 11 2 oz)   SpO2 94%   BMI 22 99 kg/m²     Physical Exam   Constitutional: She is oriented to person, place, and time  She appears well-developed  No distress  HENT:   Head: Normocephalic and atraumatic  Eyes: Pupils are equal, round, and reactive to light  Conjunctivae and EOM are normal    Neck: Neck supple  No JVD present  No thyromegaly present  Cardiovascular: Normal rate and regular rhythm  Exam reveals no gallop and no friction rub  Murmur heard    Pulmonary/Chest: Effort normal and breath sounds normal  Musculoskeletal:         General: No edema  Neurological: She is alert and oriented to person, place, and time  No cranial nerve deficit  Skin: Skin is warm and dry  No rash noted  She is not diaphoretic  No erythema  Psychiatric: She has a normal mood and affect  Her behavior is normal  Judgment and thought content normal        Cardiographics  ECG:  Normal sinus rhythm  rate of 60 beats per min    Labs:  Lab Results   Component Value Date     12/27/2013    K 3 9 11/05/2019    K 3 9 12/27/2013     11/05/2019     12/27/2013    CO2 32 11/05/2019    CO2 33 12/27/2013    BUN 15 11/05/2019    BUN 15 12/27/2013    CREATININE 0 64 11/05/2019    CREATININE 0 7 12/27/2013    CALCIUM 9 0 11/05/2019    CALCIUM 8 9 12/27/2013     Lab Results   Component Value Date    WBC 8 11 05/01/2019    HGB 15 7 (H) 05/01/2019    HCT 48 8 (H) 05/01/2019    MCV 97 05/01/2019     05/01/2019     Lab Results   Component Value Date    CHOL 128 12/27/2013    TRIG 162 (H) 11/05/2019    TRIG 155 12/27/2013    HDL 37 (L) 11/05/2019    HDL 39 12/27/2013     Imaging: Xr Chest Portable    Result Date: 1/21/2019  Narrative: CHEST INDICATION:   pneumonia  COMPARISON:  1/18/2018; CT chest 1/19/2018 EXAM PERFORMED/VIEWS:  XR CHEST PORTABLE Images: 1 FINDINGS: Cardiomediastinal silhouette appears unremarkable  Patchy right upper lobe infiltrates stable as is a small pleural effusion and compressive atelectasis at the lung bases  Left lung clear  No pneumothorax  Osseous structures appear within normal limits for patient age  Impression: 1  Stable patchy right upper lobe infiltrate  Follow-up in 6-8 weeks recommended  2   Stable small right pleural effusion and basilar atelectasis  Workstation performed: [id number]     Xr Chest 2 Views    Result Date: 1/18/2019  Narrative: CHEST INDICATION:   afib  COMPARISON:  10/11/2016   EXAM PERFORMED/VIEWS:  XR CHEST PA & LATERAL FINDINGS:  Monitoring leads and clips project over the chest  Cardiomediastinal silhouette appears unremarkable  There is an ill-defined nodular opacity at the right apex measuring 1 8 cm  Small right pleural effusion  Osseous structures appear within normal limits for patient age  Impression: Ill-defined 1 8 cm right apical nodule  Recommend follow-up CT scan  Small right pleural effusion  Workstation performed: [id number]     Ct Chest Wo Contrast    Result Date: 1/19/2019  Narrative: CT CHEST WITHOUT IV CONTRAST INDICATION:   Cough, persistent; Pneumonia complicated / unresolved Shortness of breath  COMPARISON:  CT chest of 9/1/2016, chest x-ray 1/18/2019 TECHNIQUE: CT examination of the chest was performed without intravenous contrast   Axial, sagittal, and coronal 2D reformatted images were created from the source data and submitted for interpretation  Radiation dose length product (DLP) for this visit:  303 56 mGy-cm   This examination, like all CT scans performed in the [facility], was performed utilizing techniques to minimize radiation dose exposure, including the use of iterative  reconstruction and automated exposure control  FINDINGS: LUNGS:  Patchy consolidation noted in the right upper lobe  This likely accounts for the abnormality seen on chest x-ray  Additional scattered patchy infiltrates noted bilaterally involving all lobes more extensive on the right than on the left  Associated variable bronchial wall thickening  PLEURA:  Small right pleural effusion  HEART/GREAT VESSELS:  Moderate coronary artery calcifications  MEDIASTINUM AND VLADIMIR:  A large right pericardiac lymph node measures 2 0 x 1 6 cm image 32 series 2  Numerous calcified mediastinal lymph nodes noted  CHEST WALL AND LOWER NECK:   Unremarkable  VISUALIZED STRUCTURES IN THE UPPER ABDOMEN:  Partially visualized cyst in the left upper quadrant likely renal   This measures up to 9 3 cm in size  OSSEOUS STRUCTURES:  No acute fracture or destructive osseous lesion  Impression: 1  Scattered patchy consolidation infiltrates bilaterally compatible with multifocal pneumonia  Recommend follow-up to resolution  Follow-up chest CT recommended in 6-8 weeks  2  Enlarged right pericardiac lymph node, question reactive  This can be reassessed on follow-up  3   New small right pleural effusion  4   Partially visualized large cyst in the left upper quadrant likely of renal origin  This could be followed up with renal ultrasound  The study was marked in EPIC for significant notification  Workstation performed: [id number]       Discussion/Summary:  1  Paroxysmal atrial fibrillation (HCC)    2  Benign essential hypertension    3  Chronic diastolic congestive heart failure ([address] )      - she remains in sinus rhythm  She is on Xarelto to prevent CVA  Discussed anticoagulation with her in detail including risk and benefit  She will continue on Xarelto  She should stop if hemorrhoid bleeding worsens  - cough stopped with discontinuation of lisinopril   - discuss increase in fiber intake to prevent constipation and hemorrhoids  - blood work ordered by Dr Nathan Melendez  - Will discuss repeat CT chest with Dr Roxy Louise  She had prior abnormal CT  She has no symptoms at this time

## 2021-04-07 ENCOUNTER — APPOINTMENT (OUTPATIENT)
Dept: LAB | Facility: CLINIC | Age: 86
End: 2021-04-07
Payer: MEDICARE

## 2021-04-07 DIAGNOSIS — E78.49 OTHER HYPERLIPIDEMIA: ICD-10-CM

## 2021-04-07 LAB
ALBUMIN SERPL BCP-MCNC: 3.4 G/DL (ref 3.5–5)
ALP SERPL-CCNC: 61 U/L (ref 46–116)
ALT SERPL W P-5'-P-CCNC: 16 U/L (ref 12–78)
ANION GAP SERPL CALCULATED.3IONS-SCNC: 4 MMOL/L (ref 4–13)
AST SERPL W P-5'-P-CCNC: 20 U/L (ref 5–45)
BILIRUB SERPL-MCNC: 1.68 MG/DL (ref 0.2–1)
BUN SERPL-MCNC: 17 MG/DL (ref 5–25)
CALCIUM ALBUM COR SERPL-MCNC: 9.8 MG/DL (ref 8.3–10.1)
CALCIUM SERPL-MCNC: 9.3 MG/DL (ref 8.3–10.1)
CHLORIDE SERPL-SCNC: 101 MMOL/L (ref 100–108)
CHOLEST SERPL-MCNC: 157 MG/DL (ref 50–200)
CO2 SERPL-SCNC: 33 MMOL/L (ref 21–32)
CREAT SERPL-MCNC: 0.62 MG/DL (ref 0.6–1.3)
ERYTHROCYTE [DISTWIDTH] IN BLOOD BY AUTOMATED COUNT: 13.7 % (ref 11.6–15.1)
GFR SERPL CREATININE-BSD FRML MDRD: 81 ML/MIN/1.73SQ M
GLUCOSE P FAST SERPL-MCNC: 103 MG/DL (ref 65–99)
HCT VFR BLD AUTO: 43.3 % (ref 34.8–46.1)
HDLC SERPL-MCNC: 46 MG/DL
HGB BLD-MCNC: 13.8 G/DL (ref 11.5–15.4)
LDLC SERPL CALC-MCNC: 89 MG/DL (ref 0–100)
MCH RBC QN AUTO: 29.2 PG (ref 26.8–34.3)
MCHC RBC AUTO-ENTMCNC: 31.9 G/DL (ref 31.4–37.4)
MCV RBC AUTO: 92 FL (ref 82–98)
NONHDLC SERPL-MCNC: 111 MG/DL
PLATELET # BLD AUTO: 376 THOUSANDS/UL (ref 149–390)
PMV BLD AUTO: 9.8 FL (ref 8.9–12.7)
POTASSIUM SERPL-SCNC: 3.7 MMOL/L (ref 3.5–5.3)
PROT SERPL-MCNC: 8 G/DL (ref 6.4–8.2)
RBC # BLD AUTO: 4.73 MILLION/UL (ref 3.81–5.12)
SODIUM SERPL-SCNC: 138 MMOL/L (ref 136–145)
TRIGL SERPL-MCNC: 112 MG/DL
WBC # BLD AUTO: 6.81 THOUSAND/UL (ref 4.31–10.16)

## 2021-04-07 PROCEDURE — 85027 COMPLETE CBC AUTOMATED: CPT | Performed by: INTERNAL MEDICINE

## 2021-04-07 PROCEDURE — 93000 ELECTROCARDIOGRAM COMPLETE: CPT | Performed by: INTERNAL MEDICINE

## 2021-04-07 PROCEDURE — 36415 COLL VENOUS BLD VENIPUNCTURE: CPT | Performed by: INTERNAL MEDICINE

## 2021-04-07 PROCEDURE — 80053 COMPREHEN METABOLIC PANEL: CPT | Performed by: INTERNAL MEDICINE

## 2021-04-07 PROCEDURE — 80061 LIPID PANEL: CPT | Performed by: INTERNAL MEDICINE

## 2021-04-08 ENCOUNTER — TELEPHONE (OUTPATIENT)
Dept: CARDIOLOGY CLINIC | Facility: CLINIC | Age: 86
End: 2021-04-08

## 2021-04-08 NOTE — TELEPHONE ENCOUNTER
----- Message from Lizett Rich DO sent at 4/8/2021  4:19 PM EDT -----  Can you please let the patient know blood work was normal

## 2021-04-13 ENCOUNTER — APPOINTMENT (EMERGENCY)
Dept: RADIOLOGY | Facility: HOSPITAL | Age: 86
End: 2021-04-13
Payer: MEDICARE

## 2021-04-13 ENCOUNTER — HOSPITAL ENCOUNTER (EMERGENCY)
Facility: HOSPITAL | Age: 86
Discharge: HOME | End: 2021-04-13
Attending: EMERGENCY MEDICINE
Payer: MEDICARE

## 2021-04-13 VITALS
SYSTOLIC BLOOD PRESSURE: 155 MMHG | OXYGEN SATURATION: 98 % | RESPIRATION RATE: 18 BRPM | TEMPERATURE: 97.9 F | HEART RATE: 63 BPM | DIASTOLIC BLOOD PRESSURE: 75 MMHG

## 2021-04-13 DIAGNOSIS — K64.9 BLEEDING HEMORRHOID: Primary | ICD-10-CM

## 2021-04-13 LAB
ALBUMIN SERPL BCP-MCNC: 3.1 G/DL (ref 3.5–5)
ALP SERPL-CCNC: 62 U/L (ref 46–116)
ALT SERPL W P-5'-P-CCNC: 19 U/L (ref 12–78)
ANION GAP SERPL CALCULATED.3IONS-SCNC: 5 MMOL/L (ref 4–13)
AST SERPL W P-5'-P-CCNC: 25 U/L (ref 5–45)
BACTERIA UR QL AUTO: ABNORMAL /HPF
BASOPHILS # BLD AUTO: 0.03 THOUSANDS/ΜL (ref 0–0.1)
BASOPHILS NFR BLD AUTO: 0 % (ref 0–1)
BILIRUB SERPL-MCNC: 1.52 MG/DL (ref 0.2–1)
BILIRUB UR QL STRIP: NEGATIVE
BUN SERPL-MCNC: 13 MG/DL (ref 5–25)
CALCIUM ALBUM COR SERPL-MCNC: 9.2 MG/DL (ref 8.3–10.1)
CALCIUM SERPL-MCNC: 8.5 MG/DL (ref 8.3–10.1)
CHLORIDE SERPL-SCNC: 100 MMOL/L (ref 100–108)
CLARITY UR: CLEAR
CO2 SERPL-SCNC: 31 MMOL/L (ref 21–32)
COLOR UR: YELLOW
CREAT SERPL-MCNC: 0.65 MG/DL (ref 0.6–1.3)
EOSINOPHIL # BLD AUTO: 0.14 THOUSAND/ΜL (ref 0–0.61)
EOSINOPHIL NFR BLD AUTO: 2 % (ref 0–6)
ERYTHROCYTE [DISTWIDTH] IN BLOOD BY AUTOMATED COUNT: 13.5 % (ref 11.6–15.1)
GFR SERPL CREATININE-BSD FRML MDRD: 80 ML/MIN/1.73SQ M
GLUCOSE SERPL-MCNC: 107 MG/DL (ref 65–140)
GLUCOSE UR STRIP-MCNC: NEGATIVE MG/DL
HCT VFR BLD AUTO: 40.1 % (ref 34.8–46.1)
HGB BLD-MCNC: 13 G/DL (ref 11.5–15.4)
HGB UR QL STRIP.AUTO: ABNORMAL
HOLD SPECIMEN: NORMAL
IMM GRANULOCYTES # BLD AUTO: 0.02 THOUSAND/UL (ref 0–0.2)
IMM GRANULOCYTES NFR BLD AUTO: 0 % (ref 0–2)
KETONES UR STRIP-MCNC: NEGATIVE MG/DL
LEUKOCYTE ESTERASE UR QL STRIP: NEGATIVE
LYMPHOCYTES # BLD AUTO: 0.98 THOUSANDS/ΜL (ref 0.6–4.47)
LYMPHOCYTES NFR BLD AUTO: 15 % (ref 14–44)
MCH RBC QN AUTO: 29.9 PG (ref 26.8–34.3)
MCHC RBC AUTO-ENTMCNC: 32.4 G/DL (ref 31.4–37.4)
MCV RBC AUTO: 92 FL (ref 82–98)
MONOCYTES # BLD AUTO: 0.74 THOUSAND/ΜL (ref 0.17–1.22)
MONOCYTES NFR BLD AUTO: 11 % (ref 4–12)
NEUTROPHILS # BLD AUTO: 4.78 THOUSANDS/ΜL (ref 1.85–7.62)
NEUTS SEG NFR BLD AUTO: 72 % (ref 43–75)
NITRITE UR QL STRIP: NEGATIVE
NON-SQ EPI CELLS URNS QL MICRO: ABNORMAL /HPF
NRBC BLD AUTO-RTO: 0 /100 WBCS
PH UR STRIP.AUTO: 7.5 [PH]
PLATELET # BLD AUTO: 315 THOUSANDS/UL (ref 149–390)
PMV BLD AUTO: 9.5 FL (ref 8.9–12.7)
POTASSIUM SERPL-SCNC: 3.8 MMOL/L (ref 3.5–5.3)
PROT SERPL-MCNC: 7.1 G/DL (ref 6.4–8.2)
PROT UR STRIP-MCNC: NEGATIVE MG/DL
RBC # BLD AUTO: 4.35 MILLION/UL (ref 3.81–5.12)
RBC #/AREA URNS AUTO: ABNORMAL /HPF
SODIUM SERPL-SCNC: 136 MMOL/L (ref 136–145)
SP GR UR STRIP.AUTO: 1.01 (ref 1–1.03)
UROBILINOGEN UR QL STRIP.AUTO: 0.2 E.U./DL
WBC # BLD AUTO: 6.69 THOUSAND/UL (ref 4.31–10.16)
WBC #/AREA URNS AUTO: ABNORMAL /HPF

## 2021-04-13 PROCEDURE — 74177 CT ABD & PELVIS W/CONTRAST: CPT

## 2021-04-13 PROCEDURE — G1004 CDSM NDSC: HCPCS

## 2021-04-13 PROCEDURE — 85025 COMPLETE CBC W/AUTO DIFF WBC: CPT

## 2021-04-13 PROCEDURE — 99284 EMERGENCY DEPT VISIT MOD MDM: CPT | Performed by: EMERGENCY MEDICINE

## 2021-04-13 PROCEDURE — 81001 URINALYSIS AUTO W/SCOPE: CPT

## 2021-04-13 PROCEDURE — 80053 COMPREHEN METABOLIC PANEL: CPT

## 2021-04-13 PROCEDURE — 36415 COLL VENOUS BLD VENIPUNCTURE: CPT

## 2021-04-13 PROCEDURE — 99284 EMERGENCY DEPT VISIT MOD MDM: CPT

## 2021-04-13 RX ORDER — HYDROCORTISONE ACETATE PRAMOXINE HCL 2.5; 1 G/100G; G/100G
CREAM TOPICAL 3 TIMES DAILY
Qty: 30 G | Refills: 0 | Status: SHIPPED | OUTPATIENT
Start: 2021-04-13

## 2021-04-13 RX ADMIN — IOHEXOL 100 ML: 350 INJECTION, SOLUTION INTRAVENOUS at 13:28

## 2021-04-13 NOTE — ED PROVIDER NOTES
History  Chief Complaint   Patient presents with    Bleeding/Bruising     pt complains of bleeding for a few days, pt unsure if from vagina or rectum (has hemorroid hx); pt on xerolto, denies dizziness, denies pain;  complains of fatigue     Patient thinks she passed some blood while on the toilet yesterday  She denies any abdominal pain  Today when she was out gardening she felt sudden urge to use the bathroom, and noticed a large amount of blood of blood clots in the toilet  Patient at 1st was not sure where the blood came from  Patient thinks it was vaginal   She arrives awake alert, states bleeding has stopped  She has no abdominal or pelvic pain  Patient is on blood thinners          Prior to Admission Medications   Prescriptions Last Dose Informant Patient Reported? Taking? Xarelto 20 MG tablet   No Yes   Sig: TAKE ONE TABLET BY MOUTH ONCE DAILY - TAKE WITH DINNER   alendronate (FOSAMAX) 70 mg tablet  Self No Yes   Sig: TAKE ONE TABLET BY MOUTH ONCE EVERY WEEK   ezetimibe-simvastatin (VYTORIN) 10-20 mg per tablet  Self No Yes   Sig: TAKE ONE TABLET BY MOUTH EVERY DAY AT BEDTIME   metoprolol tartrate (LOPRESSOR) 25 mg tablet   No Yes   Sig: Take 1 tablet (25 mg total) by mouth 2 (two) times a day      Facility-Administered Medications: None       Past Medical History:   Diagnosis Date    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[facility] [address] )     Community acquired pneumonia     last assessed: 10/11/16    Hyperlipidemia     Hypertension     Irregular heart beat     Kidney stone     Lung abnormality        Past Surgical History:   Procedure Laterality Date    APPENDECTOMY      BOWEL RESECTION      CATARACT EXTRACTION EXTRACAPSULAR W/ INTRAOCULAR LENS IMPLANTATION Bilateral     COLECTOMY      Partial    COLON SURGERY      colon cancer    COLONOSCOPY N/A 8/5/2016    Procedure: COLONOSCOPY;  Surgeon: Abelino Garsia MD;  Location: [facility] GI LAB;   Service:    [facility] EYE SURGERY      HYSTERECTOMY      THROAT SURGERY      TRACHEAL SURGERY      tumor removed benign    TUMOR REMOVAL N/A 2000    trachea       Family History   Problem Relation Age of Onset    Diabetes Sister     Hypertension Sister     Stroke Mother     Hypertension Mother      I have reviewed and agree with the history as documented  E-Cigarette/Vaping    E-Cigarette Use Never User      E-Cigarette/Vaping Substances     Social History     Tobacco Use    Smoking status: Never Smoker    Smokeless tobacco: Never Used   Substance Use Topics    Alcohol use: No    Drug use: No       Review of Systems   Constitutional: Negative for chills and fever  HENT: Negative for congestion and sore throat  Respiratory: Negative for cough and shortness of breath  Cardiovascular: Negative for chest pain  Gastrointestinal: Positive for anal bleeding and blood in stool  Negative for abdominal pain  Genitourinary: Positive for vaginal bleeding  Negative for dysuria and vaginal pain  Musculoskeletal: Negative for arthralgias and back pain  Skin: Negative for rash  Neurological: Negative for weakness  Hematological: Bruises/bleeds easily  Psychiatric/Behavioral: Positive for confusion  All other systems reviewed and are negative  Physical Exam  Physical Exam  Vitals signs and nursing note reviewed  Constitutional:       Appearance: Normal appearance  HENT:      Head: Normocephalic  Right Ear: External ear normal       Left Ear: External ear normal       Nose: Nose normal       Mouth/Throat:      Pharynx: Oropharynx is clear  Eyes:      Conjunctiva/sclera: Conjunctivae normal    Neck:      Musculoskeletal: Normal range of motion and neck supple  Cardiovascular:      Rate and Rhythm: Normal rate and regular rhythm  Pulmonary:      Effort: Pulmonary effort is normal    Abdominal:      General: Bowel sounds are normal       Palpations: Abdomen is soft  Tenderness: There is no abdominal tenderness     Genitourinary:     Vagina: No vaginal discharge  Comments: Pelvic examination done with nurse shows no bleeding in the vagina  Patient has large external hemorrhoids 1 of which recently ruptured  There is no thrombosis  Musculoskeletal: Normal range of motion  Skin:     General: Skin is warm and dry  Capillary Refill: Capillary refill takes less than 2 seconds  Neurological:      General: No focal deficit present  Mental Status: She is alert  Psychiatric:         Mood and Affect: Mood normal          Behavior: Behavior normal          Vital Signs  ED Triage Vitals [04/13/21 0950]   Temperature Pulse Respirations Blood Pressure SpO2   97 9 °F (36 6 °C) 68 17 149/70 95 %      Temp Source Heart Rate Source Patient Position - Orthostatic VS BP Location FiO2 (%)   Tympanic Monitor Lying Left arm --      Pain Score       --           Vitals:    04/13/21 0950 04/13/21 1100 04/13/21 1200 04/13/21 1423   BP: 149/70 147/65 149/69 160/76   Pulse: 68 60 60 62   Patient Position - Orthostatic VS: Lying Sitting Sitting Sitting         Visual Acuity      ED Medications  Medications   iohexol (OMNIPAQUE) 350 MG/ML injection (SINGLE-DOSE) 100 mL (100 mL Intravenous Given 4/13/21 1328)       Diagnostic Studies  Results Reviewed     Procedure Component Value Units Date/Time    Ollie draw [[id number]] Collected: 04/13/21 1019    Lab Status: In process Specimen: Blood Updated: 04/13/21 1201    Narrative: The following orders were created for panel order Ollie draw    Procedure                               Abnormality         Status                     ---------                               -----------         ------                     Rayo Dunbar Top on KZQR[[id number]]                           Final result               Gold top on AYLD[[id number]]                                                            Green / Yellow tube on BGHA[[id number]]                      Final result               Beckie Maxim / Black tube on NUUP[[id number]] Final result               Lavender Top 3 ml on STUN[[id number]]                        Final result                 Please view results for these tests on the individual orders  Comprehensive metabolic panel [[id number]]  (Abnormal) Collected: 04/13/21 1019    Lab Status: Final result Specimen: Blood from Arm, Right Updated: 04/13/21 1047     Sodium 136 mmol/L      Potassium 3 8 mmol/L      Chloride 100 mmol/L      CO2 31 mmol/L      ANION GAP 5 mmol/L      BUN 13 mg/dL      Creatinine 0 65 mg/dL      Glucose 107 mg/dL      Calcium 8 5 mg/dL      Corrected Calcium 9 2 mg/dL      AST 25 U/L      ALT 19 U/L      Alkaline Phosphatase 62 U/L      Total Protein 7 1 g/dL      Albumin 3 1 g/dL      Total Bilirubin 1 52 mg/dL      eGFR 80 ml/min/1 73sq m     Narrative:      [place] guidelines for Chronic Kidney Disease (CKD):     Stage 1 with normal or high GFR (GFR > 90 mL/min/1 73 square meters)    Stage 2 Mild CKD (GFR = 60-89 mL/min/1 73 square meters)    Stage 3A Moderate CKD (GFR = 45-59 mL/min/1 73 square meters)    Stage 3B Moderate CKD (GFR = 30-44 mL/min/1 73 square meters)    Stage 4 Severe CKD (GFR = 15-29 mL/min/1 73 square meters)    Stage 5 End Stage CKD (GFR <15 mL/min/1 73 square meters)  Note: GFR calculation is accurate only with a steady state creatinine    Urine Microscopic [[id number]]  (Abnormal) Collected: 04/13/21 1024    Lab Status: Final result Specimen: Urine, Clean Catch Updated: 04/13/21 1047     RBC, UA 0-1 /hpf      WBC, UA 0-1 /hpf      Epithelial Cells Moderate /hpf      Bacteria, UA Occasional /hpf     Narrative:       Moderate amorphous    CBC and differential [[id number]] Collected: 04/13/21 1019    Lab Status: Final result Specimen: Blood from Arm, Right Updated: 04/13/21 1037     WBC 6 69 Thousand/uL      RBC 4 35 Million/uL      Hemoglobin 13 0 g/dL      Hematocrit 40 1 %      MCV 92 fL      MCH 29 9 pg      MCHC 32 4 g/dL      RDW 13 5 % MPV 9 5 fL      Platelets 655 Thousands/uL      nRBC 0 /100 WBCs      Neutrophils Relative 72 %      Immat GRANS % 0 %      Lymphocytes Relative 15 %      Monocytes Relative 11 %      Eosinophils Relative 2 %      Basophils Relative 0 %      Neutrophils Absolute 4 78 Thousands/µL      Immature Grans Absolute 0 02 Thousand/uL      Lymphocytes Absolute 0 98 Thousands/µL      Monocytes Absolute 0 74 Thousand/µL      Eosinophils Absolute 0 14 Thousand/µL      Basophils Absolute 0 03 Thousands/µL     UA w Reflex to Microscopic w Reflex to Culture [[id number]]  (Abnormal) Collected: 04/13/21 1024    Lab Status: Final result Specimen: Urine, Clean Catch Updated: 04/13/21 1030     Color, UA Yellow     Clarity, UA Clear     Specific Gravity, UA 1 015     pH, UA 7 5     Leukocytes, UA Negative     Nitrite, UA Negative     Protein, UA Negative mg/dl      Glucose, UA Negative mg/dl      Ketones, UA Negative mg/dl      Urobilinogen, UA 0 2 E U /dl      Bilirubin, UA Negative     Blood, UA Trace-Intact                 CT abdomen pelvis with contrast   Final Result by Hardik Rudolph MD (04/13 1420)         No acute abnormality  Specifically no abnormality to account for rectal or vaginal bleeding  Workstation performed: [id number]                    Procedures  Procedures         ED Course                             SBIRT 22yo+      Most Recent Value   SBIRT (24 yo +)   In order to provide better care to our patients, we are screening all of our patients for alcohol and drug use  Would it be okay to ask you these screening questions? No Filed at: 04/13/2021 1002                    MDM  Number of Diagnoses or Management Options  Bleeding hemorrhoid:   Diagnosis management comments: Patient has no evidence of vaginal bleeding and is status post hysterectomy    Bleeding appears to be anal and in particular external hemorrhoidal      Disposition  Final diagnoses:   Bleeding hemorrhoid     Time reflects when diagnosis was documented in both MDM as applicable and the Disposition within this note     Time User Action Codes Description Comment    4/13/2021  3:25 PM Elvia Baugh Add [K64 9] Bleeding hemorrhoid       ED Disposition     ED Disposition Condition Date/Time Comment    Discharge Stable Tue Apr 13, 2021  3:24 PM Elliot Becker discharge to home/self care  Follow-up Information     Follow up With Specialties Details Why Contact Info    Sun Sun MD Family Medicine Schedule an appointment as soon as possible for a visit   [address] [facility] [address]  [phone]            Patient's Medications   Discharge Prescriptions    HYDROCORTISONE-PRAMOXINE [facility]) 2 5-1 % RECTAL CREAM    Apply topically 3 (three) times a day       Start Date: 4/13/2021 End Date: --       Order Dose: --       Quantity: 30 g    Refills: 0     No discharge procedures on file      PDMP Review     None          ED Provider  Electronically Signed by           Marilin Lane MD  04/13/21 5248

## 2021-04-15 ENCOUNTER — VBI (OUTPATIENT)
Dept: FAMILY MEDICINE CLINIC | Facility: CLINIC | Age: 86
End: 2021-04-15

## 2021-04-15 NOTE — TELEPHONE ENCOUNTER
Sajan Allen    ED Visit Information     Ed visit date: 4/15/21  Diagnosis Description: RECTAL BLEEDING  In Network? Yes [facility]  Discharge status: Home  Discharged with meds ? Yes  Number of ED visits to date: 1  ED Severity:N/A     Outreach Information    Outreach successful: Yes 1  Date letter mailed:N/A  Date [facility] Coordination    Follow up appointment with pcp: no DECLINED F/U  Transportation issues ?  No    Value Consolidated Khadar

## 2021-04-21 ENCOUNTER — OFFICE VISIT (OUTPATIENT)
Dept: CARDIOLOGY CLINIC | Facility: CLINIC | Age: 86
End: 2021-04-21
Payer: MEDICARE

## 2021-04-21 VITALS
TEMPERATURE: 98.7 F | HEIGHT: 62 IN | DIASTOLIC BLOOD PRESSURE: 72 MMHG | SYSTOLIC BLOOD PRESSURE: 124 MMHG | WEIGHT: 128 LBS | BODY MASS INDEX: 23.55 KG/M2 | HEART RATE: 68 BPM | OXYGEN SATURATION: 97 %

## 2021-04-21 DIAGNOSIS — I48.0 PAROXYSMAL ATRIAL FIBRILLATION (HCC): Primary | ICD-10-CM

## 2021-04-21 DIAGNOSIS — I10 BENIGN ESSENTIAL HYPERTENSION: ICD-10-CM

## 2021-04-21 DIAGNOSIS — E78.49 OTHER HYPERLIPIDEMIA: ICD-10-CM

## 2021-04-21 DIAGNOSIS — I50.32 CHRONIC DIASTOLIC CONGESTIVE HEART FAILURE (HCC): ICD-10-CM

## 2021-04-21 PROCEDURE — 99214 OFFICE O/P EST MOD 30 MIN: CPT | Performed by: INTERNAL MEDICINE

## 2021-04-22 NOTE — PROGRESS NOTES
Cardiology Follow Up    Suzan Cutler  1933  [id number]      Interval History: Suzan Cutler is here for follow up of atrial fibrillation  Since her last visit, she had another episode of hemorrhoidal bleeding  She was seen in ER  Bleeding stopped and she remains on Xarelto  She denies any palpitations, syncope or near syncope  She denies any chest pain or shortness of breath  No recent atrial fibrillation episodes  Her last episode occurred in January 2019, she developed shortness of breath secondary to pneumonia  Complicating pneumonia was atrial fibrillation and pleural effusion  She converted to sinus rhythm spontaneously and was started on Xarelto afterwards  Echocardiogram showed normal ejection fraction with diastolic dysfunction  She was treated with IV Lasix  The following portions of the patient's history were reviewed and updated as appropriate: She  has a past medical history of 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[facility] [address] ), Community acquired pneumonia, Hyperlipidemia, Hypertension, Irregular heart beat, Kidney stone, and Lung abnormality  She  has a past surgical history that includes Hysterectomy; Tumor removal (N/A, 2000); Colon surgery; Bowel resection; Cataract extraction, extracapsular w/ intraocular lens implant (Bilateral); Tracheal surgery; Appendectomy; Colonoscopy (N/A, 8/5/2016); Colectomy; Throat surgery; and Eye surgery  Her family history includes Diabetes in her sister; Hypertension in her mother and sister; Stroke in her mother  She  reports that she has never smoked  She has never used smokeless tobacco  She reports that she does not drink alcohol or use drugs    Current Outpatient Medications   Medication Sig Dispense Refill    alendronate (FOSAMAX) 70 mg tablet TAKE ONE TABLET BY MOUTH ONCE EVERY WEEK 12 tablet 3    ezetimibe-simvastatin (VYTORIN) 10-20 mg per tablet TAKE ONE TABLET BY MOUTH EVERY DAY AT BEDTIME 90 tablet 3    hydrocortisone-pramoxine (ANALPRAM-HC) 2 5-1 % rectal cream Apply topically 3 (three) times a day 30 g 0    metoprolol tartrate (LOPRESSOR) 25 mg tablet Take 1 tablet (25 mg total) by mouth 2 (two) times a day 180 tablet 3    rivaroxaban (XARELTO) 15 mg tablet Take 1 tablet (15 mg total) by mouth daily with breakfast 30 tablet 5     No current facility-administered medications for this visit  She is allergic to azithromycin; niacin and related; and keflex [cephalexin]         Review of Systems:  Review of Systems   Constitutional: Negative for chills and fever  HENT: Negative for ear pain and sore throat  Eyes: Negative for pain and visual disturbance  Respiratory: Negative for cough and shortness of breath  Cardiovascular: Negative for chest pain, palpitations and leg swelling  Gastrointestinal: Positive for blood in stool  Negative for abdominal pain and vomiting  Genitourinary: Negative for dysuria and hematuria  Musculoskeletal: Negative for arthralgias and back pain  Skin: Negative for color change and rash  Neurological: Negative for seizures and syncope  All other systems reviewed and are negative  Physical Exam:  /72 (BP Location: Left arm, Patient Position: Sitting, Cuff Size: Standard)   Pulse 68   Temp 98 7 °F (37 1 °C) (Temporal)   Ht 5' 2" (1 575 m)   Wt 58 1 kg (128 lb)   SpO2 97%   BMI 23 41 kg/m²     Physical Exam   Constitutional: She is oriented to person, place, and time  She appears well-developed  No distress  HENT:   Head: Normocephalic and atraumatic  Eyes: Pupils are equal, round, and reactive to light  Conjunctivae and EOM are normal    Neck: Neck supple  No JVD present  No thyromegaly present  Cardiovascular: Normal rate and regular rhythm  Exam reveals no gallop and no friction rub  Murmur heard  Pulmonary/Chest: Effort normal and breath sounds normal    Musculoskeletal:         General: No edema     Neurological: She is alert and oriented to person, place, and time  No cranial nerve deficit  Skin: Skin is warm and dry  No rash noted  She is not diaphoretic  No erythema  Psychiatric: She has a normal mood and affect  Her behavior is normal  Judgment and thought content normal        Cardiographics  ECG:  Normal sinus rhythm  rate of 60 beats per min    Labs:  Lab Results   Component Value Date     12/27/2013    K 3 8 04/13/2021    K 3 9 12/27/2013     04/13/2021     12/27/2013    CO2 31 04/13/2021    CO2 33 12/27/2013    BUN 13 04/13/2021    BUN 15 12/27/2013    CREATININE 0 65 04/13/2021    CREATININE 0 7 12/27/2013    CALCIUM 8 5 04/13/2021    CALCIUM 8 9 12/27/2013     Lab Results   Component Value Date    WBC 6 69 04/13/2021    HGB 13 0 04/13/2021    HCT 40 1 04/13/2021    MCV 92 04/13/2021     04/13/2021     Lab Results   Component Value Date    CHOL 128 12/27/2013    TRIG 112 04/07/2021    TRIG 155 12/27/2013    HDL 46 04/07/2021    HDL 39 12/27/2013     Imaging: Xr Chest Portable    Result Date: 1/21/2019  Narrative: CHEST INDICATION:   pneumonia  COMPARISON:  1/18/2018; CT chest 1/19/2018 EXAM PERFORMED/VIEWS:  XR CHEST PORTABLE Images: 1 FINDINGS: Cardiomediastinal silhouette appears unremarkable  Patchy right upper lobe infiltrates stable as is a small pleural effusion and compressive atelectasis at the lung bases  Left lung clear  No pneumothorax  Osseous structures appear within normal limits for patient age  Impression: 1  Stable patchy right upper lobe infiltrate  Follow-up in 6-8 weeks recommended  2   Stable small right pleural effusion and basilar atelectasis  Workstation performed: [id number]     Xr Chest 2 Views    Result Date: 1/18/2019  Narrative: CHEST INDICATION:   afib  COMPARISON:  10/11/2016  EXAM PERFORMED/VIEWS:  XR CHEST PA & LATERAL FINDINGS:  Monitoring leads and clips project over the chest  Cardiomediastinal silhouette appears unremarkable   There is an ill-defined nodular opacity at the right apex measuring 1 8 cm  Small right pleural effusion  Osseous structures appear within normal limits for patient age  Impression: Ill-defined 1 8 cm right apical nodule  Recommend follow-up CT scan  Small right pleural effusion  Workstation performed: [id number]     Ct Chest Wo Contrast    Result Date: 1/19/2019  Narrative: CT CHEST WITHOUT IV CONTRAST INDICATION:   Cough, persistent; Pneumonia complicated / unresolved Shortness of breath  COMPARISON:  CT chest of 9/1/2016, chest x-ray 1/18/2019 TECHNIQUE: CT examination of the chest was performed without intravenous contrast   Axial, sagittal, and coronal 2D reformatted images were created from the source data and submitted for interpretation  Radiation dose length product (DLP) for this visit:  303 56 mGy-cm   This examination, like all CT scans performed in the [facility], was performed utilizing techniques to minimize radiation dose exposure, including the use of iterative  reconstruction and automated exposure control  FINDINGS: LUNGS:  Patchy consolidation noted in the right upper lobe  This likely accounts for the abnormality seen on chest x-ray  Additional scattered patchy infiltrates noted bilaterally involving all lobes more extensive on the right than on the left  Associated variable bronchial wall thickening  PLEURA:  Small right pleural effusion  HEART/GREAT VESSELS:  Moderate coronary artery calcifications  MEDIASTINUM AND VLADIMIR:  A large right pericardiac lymph node measures 2 0 x 1 6 cm image 32 series 2  Numerous calcified mediastinal lymph nodes noted  CHEST WALL AND LOWER NECK:   Unremarkable  VISUALIZED STRUCTURES IN THE UPPER ABDOMEN:  Partially visualized cyst in the left upper quadrant likely renal   This measures up to 9 3 cm in size  OSSEOUS STRUCTURES:  No acute fracture or destructive osseous lesion  Impression: 1    Scattered patchy consolidation infiltrates bilaterally compatible with multifocal pneumonia  Recommend follow-up to resolution  Follow-up chest CT recommended in 6-8 weeks  2  Enlarged right pericardiac lymph node, question reactive  This can be reassessed on follow-up  3   New small right pleural effusion  4   Partially visualized large cyst in the left upper quadrant likely of renal origin  This could be followed up with renal ultrasound  The study was marked in EPIC for significant notification  Workstation performed: [id number]       Discussion/Summary:  1  Paroxysmal atrial fibrillation (HCC)    2  Benign essential hypertension    3  Chronic diastolic congestive heart failure ([address] )    4  Other hyperlipidemia      - she remains in sinus rhythm  She is on Xarelto to prevent CVA  Discussed anticoagulation with her in detail including risk and benefit  She will continue on Xarelto but will reduce dose to 15 mg daily  Encouraged to speak with GI (Dr Nadine Dave) to see if she can have a procedure for treatment of Hemorrhoids  - cough stopped with discontinuation of lisinopril   - discuss increase in fiber intake to prevent constipation and hemorrhoids  - Will discuss repeat CT chest with Dr Asiya Hernández  She had prior abnormal CT  She has no symptoms at this time

## 2021-05-20 ENCOUNTER — PREP FOR PROCEDURE (OUTPATIENT)
Dept: GASTROENTEROLOGY | Facility: CLINIC | Age: 86
End: 2021-05-20

## 2021-05-20 DIAGNOSIS — Z85.038 HISTORY OF COLON CANCER: Primary | ICD-10-CM

## 2021-05-20 DIAGNOSIS — K57.90 DIVERTICULOSIS: ICD-10-CM

## 2021-05-20 DIAGNOSIS — Z01.818 PREOP TESTING: ICD-10-CM

## 2021-05-20 DIAGNOSIS — K64.9 HEMORRHOIDS, UNSPECIFIED HEMORRHOID TYPE: ICD-10-CM

## 2021-06-14 ENCOUNTER — TELEPHONE (OUTPATIENT)
Dept: GASTROENTEROLOGY | Facility: CLINIC | Age: 86
End: 2021-06-14

## 2021-06-14 ENCOUNTER — TELEPHONE (OUTPATIENT)
Dept: CARDIOLOGY CLINIC | Facility: CLINIC | Age: 86
End: 2021-06-14

## 2021-06-15 ENCOUNTER — OFFICE VISIT (OUTPATIENT)
Dept: FAMILY MEDICINE CLINIC | Facility: CLINIC | Age: 86
End: 2021-06-15
Payer: MEDICARE

## 2021-06-15 ENCOUNTER — LAB (OUTPATIENT)
Dept: LAB | Facility: CLINIC | Age: 86
End: 2021-06-15
Payer: MEDICARE

## 2021-06-15 VITALS
SYSTOLIC BLOOD PRESSURE: 114 MMHG | WEIGHT: 126.4 LBS | DIASTOLIC BLOOD PRESSURE: 66 MMHG | HEIGHT: 62 IN | TEMPERATURE: 97.7 F | RESPIRATION RATE: 16 BRPM | OXYGEN SATURATION: 97 % | HEART RATE: 66 BPM | BODY MASS INDEX: 23.26 KG/M2

## 2021-06-15 DIAGNOSIS — R35.0 FREQUENCY OF URINATION: Primary | ICD-10-CM

## 2021-06-15 DIAGNOSIS — R35.0 FREQUENCY OF URINATION: ICD-10-CM

## 2021-06-15 PROCEDURE — 99213 OFFICE O/P EST LOW 20 MIN: CPT | Performed by: FAMILY MEDICINE

## 2021-06-15 PROCEDURE — 87077 CULTURE AEROBIC IDENTIFY: CPT

## 2021-06-15 PROCEDURE — 87186 SC STD MICRODIL/AGAR DIL: CPT

## 2021-06-15 PROCEDURE — 87086 URINE CULTURE/COLONY COUNT: CPT

## 2021-06-15 RX ORDER — CIPROFLOXACIN 250 MG/1
250 TABLET, FILM COATED ORAL EVERY 12 HOURS SCHEDULED
Qty: 14 TABLET | Refills: 0 | Status: SHIPPED | OUTPATIENT
Start: 2021-06-15 | End: 2021-06-23

## 2021-06-15 NOTE — PROGRESS NOTES
Chief Complaint   Patient presents with    Urinary Tract Infection     frequency        Patient ID: Sohan Cramer is a 80 y o  female  HPI  Pt is seeing for frequent urination x 1 days -  Had similar symptoms 1 wk ago  -  Resolved after 2 days then came back -  No other symptoms, no therapy tried     The following portions of the patient's history were reviewed and updated as appropriate: allergies, current medications, past family history, past medical history, past social history, past surgical history and problem list     Review of Systems   Constitutional: Negative  Negative for fatigue and fever  Respiratory: Negative  Cardiovascular: Negative  Gastrointestinal: Negative  Negative for abdominal pain, constipation and diarrhea  Genitourinary: Positive for frequency  Negative for flank pain  Current Outpatient Medications   Medication Sig Dispense Refill    alendronate (FOSAMAX) 70 mg tablet TAKE ONE TABLET BY MOUTH ONCE EVERY WEEK 12 tablet 3    ezetimibe-simvastatin (VYTORIN) 10-20 mg per tablet TAKE ONE TABLET BY MOUTH EVERY DAY AT BEDTIME 90 tablet 3    hydrocortisone-pramoxine (ANALPRAM-HC) 2 5-1 % rectal cream Apply topically 3 (three) times a day 30 g 0    metoprolol tartrate (LOPRESSOR) 25 mg tablet Take 1 tablet (25 mg total) by mouth 2 (two) times a day 180 tablet 3    rivaroxaban (XARELTO) 15 mg tablet Take 1 tablet (15 mg total) by mouth daily with breakfast (Patient taking differently: Take 15 mg by mouth daily with breakfast Takes with dinner) 30 tablet 5     No current facility-administered medications for this visit  Objective:    /66 (BP Location: Left arm, Patient Position: Sitting, Cuff Size: Standard)   Pulse 66   Temp 97 7 °F (36 5 °C) (Temporal)   Resp 16   Ht 5' 2" (1 575 m)   Wt 57 3 kg (126 lb 6 4 oz)   SpO2 97%   BMI 23 12 kg/m²        Physical Exam  Constitutional:       Appearance: She is not ill-appearing     Cardiovascular:      Rate and Rhythm: Normal rate  Pulmonary:      Effort: Pulmonary effort is normal  No respiratory distress  Abdominal:      General: There is no distension  Palpations: Abdomen is soft  Tenderness: There is no abdominal tenderness  There is no right CVA tenderness or left CVA tenderness  Neurological:      Mental Status: She is alert  Assessment/Plan:         Diagnoses and all orders for this visit:    Frequency of urination  -     Cancel: POCT urine dip auto non-scope  -     Urine culture; Future  -     ciprofloxacin (CIPRO) 250 mg tablet;  Take 1 tablet (250 mg total) by mouth every 12 (twelve) hours for 7 days      cannot provide urine sample       rto prn                     Armani Cisneros MD

## 2021-06-17 LAB — BACTERIA UR CULT: ABNORMAL

## 2021-06-18 NOTE — TELEPHONE ENCOUNTER
Spoke to pt and informed her cardiology cleared her and medication recommendations  She understands and agrees

## 2021-06-22 ENCOUNTER — TELEPHONE (OUTPATIENT)
Dept: PREADMISSION TESTING | Facility: HOSPITAL | Age: 86
End: 2021-06-22

## 2021-06-23 ENCOUNTER — TELEPHONE (OUTPATIENT)
Dept: CARDIOLOGY CLINIC | Facility: CLINIC | Age: 86
End: 2021-06-23

## 2021-06-23 RX ORDER — HYDROCHLOROTHIAZIDE 25 MG/1
25 TABLET ORAL DAILY
COMMUNITY
End: 2021-07-01

## 2021-06-23 NOTE — PRE-PROCEDURE INSTRUCTIONS
Pre-Surgery Instructions:   Medication Instructions    alendronate (FOSAMAX) 70 mg tablet Patient was instructed by Physician and understands   ezetimibe-simvastatin (VYTORIN) 10-20 mg per tablet Patient was instructed by Physician and understands   hydrochlorothiazide (HYDRODIURIL) 25 mg tablet Patient was instructed by Physician and understands   hydrocortisone-pramoxine (ANALPRAM-HC) 2 5-1 % rectal cream Patient was instructed by Physician and understands   metoprolol tartrate (LOPRESSOR) 25 mg tablet Instructed patient per Anesthesia Guidelines   rivaroxaban (XARELTO) 15 mg tablet Patient was instructed by Physician and understands  LD xarelto 06/27/21  Pt to take metoprolol a m of procedure

## 2021-06-30 ENCOUNTER — ANESTHESIA EVENT (OUTPATIENT)
Dept: ANESTHESIOLOGY | Facility: HOSPITAL | Age: 86
End: 2021-06-30

## 2021-06-30 ENCOUNTER — ANESTHESIA (OUTPATIENT)
Dept: ANESTHESIOLOGY | Facility: HOSPITAL | Age: 86
End: 2021-06-30

## 2021-06-30 ENCOUNTER — TELEPHONE (OUTPATIENT)
Dept: GASTROENTEROLOGY | Facility: AMBULARY SURGERY CENTER | Age: 86
End: 2021-06-30

## 2021-06-30 PROBLEM — Z79.01 ON CONTINUOUS ORAL ANTICOAGULATION: Status: ACTIVE | Noted: 2021-06-30

## 2021-06-30 NOTE — ANESTHESIA PREPROCEDURE EVALUATION
Procedure:  PRE-OP ONLY    Relevant Problems   CARDIO   (+) Benign essential hypertension   (+) Chronic diastolic congestive heart failure (HCC)   (+) Dyspnea on exertion   (+) Other hyperlipidemia   (+) Paroxysmal atrial fibrillation (HCC)      /RENAL   (+) Renal cyst      PULMONARY   (+) Centrilobular emphysema (HCC)   (+) Dyspnea on exertion   (+) Pleural effusion on right      Other   (+) Macular degeneration   (+) Mediastinal adenopathy   (+) On continuous oral anticoagulation             Anesthesia Plan  ASA Score- 3     Anesthesia Type- IV sedation with anesthesia with ASA Monitors  Additional Monitors:   Airway Plan:           Plan Factors-    Chart reviewed  Patient is not a current smoker  Induction- intravenous  Postoperative Plan-     Informed Consent- Anesthetic plan and risks discussed with patient  I personally reviewed this patient with the CRNA  Discussed and agreed on the Anesthesia Plan with the CRNA  Sarah Briceno

## 2021-07-01 ENCOUNTER — HOSPITAL ENCOUNTER (OUTPATIENT)
Dept: GASTROENTEROLOGY | Facility: AMBULARY SURGERY CENTER | Age: 86
Setting detail: OUTPATIENT SURGERY
Discharge: HOME | End: 2021-07-01
Attending: INTERNAL MEDICINE | Admitting: INTERNAL MEDICINE
Payer: MEDICARE

## 2021-07-01 ENCOUNTER — ANESTHESIA (OUTPATIENT)
Dept: GASTROENTEROLOGY | Facility: AMBULARY SURGERY CENTER | Age: 86
End: 2021-07-01

## 2021-07-01 ENCOUNTER — ANESTHESIA EVENT (OUTPATIENT)
Dept: GASTROENTEROLOGY | Facility: AMBULARY SURGERY CENTER | Age: 86
End: 2021-07-01

## 2021-07-01 VITALS
RESPIRATION RATE: 18 BRPM | WEIGHT: 125 LBS | DIASTOLIC BLOOD PRESSURE: 59 MMHG | HEART RATE: 65 BPM | SYSTOLIC BLOOD PRESSURE: 126 MMHG | HEIGHT: 62 IN | TEMPERATURE: 97.2 F | BODY MASS INDEX: 23 KG/M2 | OXYGEN SATURATION: 95 %

## 2021-07-01 DIAGNOSIS — I10 BENIGN ESSENTIAL HYPERTENSION: Primary | ICD-10-CM

## 2021-07-01 DIAGNOSIS — K64.9 HEMORRHOIDS, UNSPECIFIED HEMORRHOID TYPE: ICD-10-CM

## 2021-07-01 DIAGNOSIS — Z85.038 HISTORY OF COLON CANCER: ICD-10-CM

## 2021-07-01 DIAGNOSIS — E78.5 HYPERLIPIDEMIA, UNSPECIFIED HYPERLIPIDEMIA TYPE: ICD-10-CM

## 2021-07-01 DIAGNOSIS — K57.90 DIVERTICULOSIS: ICD-10-CM

## 2021-07-01 PROCEDURE — 45330 DIAGNOSTIC SIGMOIDOSCOPY: CPT | Performed by: INTERNAL MEDICINE

## 2021-07-01 RX ORDER — PROPOFOL 10 MG/ML
INJECTION, EMULSION INTRAVENOUS CONTINUOUS PRN
Status: DISCONTINUED | OUTPATIENT
Start: 2021-07-01 | End: 2021-07-01

## 2021-07-01 RX ORDER — HYDROCHLOROTHIAZIDE 25 MG/1
TABLET ORAL
Qty: 90 TABLET | Refills: 3 | Status: SHIPPED | OUTPATIENT
Start: 2021-07-01 | End: 2022-06-20

## 2021-07-01 RX ORDER — SODIUM CHLORIDE, SODIUM LACTATE, POTASSIUM CHLORIDE, CALCIUM CHLORIDE 600; 310; 30; 20 MG/100ML; MG/100ML; MG/100ML; MG/100ML
75 INJECTION, SOLUTION INTRAVENOUS CONTINUOUS
Status: DISCONTINUED | OUTPATIENT
Start: 2021-07-01 | End: 2021-07-05 | Stop reason: HOSPADM

## 2021-07-01 RX ORDER — PROPOFOL 10 MG/ML
INJECTION, EMULSION INTRAVENOUS AS NEEDED
Status: DISCONTINUED | OUTPATIENT
Start: 2021-07-01 | End: 2021-07-01

## 2021-07-01 RX ADMIN — PROPOFOL 60 MCG/KG/MIN: 10 INJECTION, EMULSION INTRAVENOUS at 11:03

## 2021-07-01 RX ADMIN — SODIUM CHLORIDE, SODIUM LACTATE, POTASSIUM CHLORIDE, AND CALCIUM CHLORIDE: .6; .31; .03; .02 INJECTION, SOLUTION INTRAVENOUS at 10:37

## 2021-07-01 RX ADMIN — PROPOFOL 60 MG: 10 INJECTION, EMULSION INTRAVENOUS at 11:02

## 2021-07-01 NOTE — H&P
History and Physical - [facility]  Arjun Silva 80 y o  female MRN: [id number]                  HPI: Arjun Silva is a 80y o  year old female who presents for colonoscopy history of colon cancer 2001 last colonoscopy 3 or 5 years ago      REVIEW OF SYSTEMS: Per the HPI, and otherwise unremarkable  Historical Information   Past Medical History:   Diagnosis Date    A-fib [facility])     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[address] )     Community acquired pneumonia     last assessed: 10/11/16    Hyperlipidemia     Hypertension     Irregular heart beat     Kidney stone     Lung abnormality     Macular degeneration      Past Surgical History:   Procedure Laterality Date    APPENDECTOMY      BOWEL RESECTION      CATARACT EXTRACTION EXTRACAPSULAR W/ INTRAOCULAR LENS IMPLANTATION Bilateral     COLECTOMY      Partial    COLON SURGERY      colon cancer    COLONOSCOPY N/A 8/5/2016    Procedure: COLONOSCOPY;  Surgeon: Roni Abdul MD;  Location: [facility];   Service:     EYE SURGERY      HYSTERECTOMY      THROAT SURGERY      TRACHEAL SURGERY      tumor removed benign    TUMOR REMOVAL N/A 2000    trachea     Social History   Social History     Substance and Sexual Activity   Alcohol Use No     Social History     Substance and Sexual Activity   Drug Use No     Social History     Tobacco Use   Smoking Status Never Smoker   Smokeless Tobacco Never Used     Family History   Problem Relation Age of Onset    Diabetes Sister     Hypertension Sister     Stroke Mother     Hypertension Mother        Meds/Allergies       Current Outpatient Medications:     alendronate (FOSAMAX) 70 mg tablet    ezetimibe-simvastatin (VYTORIN) 10-20 mg per tablet    hydrochlorothiazide (HYDRODIURIL) 25 mg tablet    metoprolol tartrate (LOPRESSOR) 25 mg tablet    hydrocortisone-pramoxine (ANALPRAM-HC) 2 5-1 % rectal cream    rivaroxaban (XARELTO) 15 mg tablet    Current Facility-Administered Medications:     lactated ringers infusion, 75 mL/hr, Intravenous, Continuous    Allergies   Allergen Reactions    Azithromycin     Niacin And Related     Keflex [Cephalexin] Rash       Objective     /66   Pulse 69   Temp (!) 97 2 °F (36 2 °C) (Temporal)   Resp 16   Ht 5' 2" (1 575 m)   Wt 56 7 kg (125 lb)   SpO2 98%   BMI 22 86 kg/m²       PHYSICAL EXAM    Gen: NAD  Head: NCAT  CV: RRR  CHEST: Clear  ABD: soft, NT/ND  EXT: no edema      ASSESSMENT/PLAN:  This is a 80y o  year old female here for colonoscopy, and she is stable and optimized for her procedure

## 2021-07-01 NOTE — ANESTHESIA POSTPROCEDURE EVALUATION
Post-Op Assessment Note    CV Status:  Stable    Pain management: adequate     Mental Status:  Alert and awake   Hydration Status:  Euvolemic   PONV Controlled:  Controlled   Airway Patency:  Patent      Post Op Vitals Reviewed: Yes      Staff: CRNA         No complications documented      BP   112/78   Temp     Pulse  80   Resp   18   SpO2   99

## 2021-07-01 NOTE — ANESTHESIA PREPROCEDURE EVALUATION
Procedure:  COLONOSCOPY    Relevant Problems   CARDIO   (+) Benign essential hypertension   (+) Chronic diastolic congestive heart failure (HCC)   (+) Dyspnea on exertion   (+) Other hyperlipidemia   (+) Paroxysmal atrial fibrillation (HCC)      /RENAL   (+) Renal cyst      PULMONARY   (+) Centrilobular emphysema (HCC)   (+) Dyspnea on exertion   (+) Pleural effusion on right      Other   (+) Mediastinal adenopathy   (+) On continuous oral anticoagulation        Physical Exam    Airway    Mallampati score: III  TM Distance: >3 FB  Neck ROM: full     Dental       Cardiovascular  Rhythm: regular, Rate: normal,     Pulmonary  Breath sounds clear to auscultation,     Other Findings        Anesthesia Plan  ASA Score- 3     Anesthesia Type- IV sedation with anesthesia with ASA Monitors  Additional Monitors:   Airway Plan:           Plan Factors-    Chart reviewed  Patient is not a current smoker  Induction- intravenous  Postoperative Plan-     Informed Consent- Anesthetic plan and risks discussed with patient  I personally reviewed this patient with the CRNA  Discussed and agreed on the Anesthesia Plan with the CRNA  Danna Knapp

## 2021-07-02 RX ORDER — EZETIMIBE AND SIMVASTATIN 10; 20 MG/1; MG/1
TABLET ORAL
Qty: 90 TABLET | Refills: 3 | Status: SHIPPED | OUTPATIENT
Start: 2021-07-02 | End: 2022-06-30

## 2021-07-10 DIAGNOSIS — M81.0 OSTEOPOROSIS, UNSPECIFIED OSTEOPOROSIS TYPE, UNSPECIFIED PATHOLOGICAL FRACTURE PRESENCE: ICD-10-CM

## 2021-07-12 RX ORDER — ALENDRONATE SODIUM 70 MG/1
TABLET ORAL
Qty: 12 TABLET | Refills: 3 | Status: SHIPPED | OUTPATIENT
Start: 2021-07-12 | End: 2022-06-13

## 2021-07-13 ENCOUNTER — TELEPHONE (OUTPATIENT)
Dept: OTHER | Facility: OTHER | Age: 86
End: 2021-07-13

## 2021-07-15 ENCOUNTER — OFFICE VISIT (OUTPATIENT)
Dept: CARDIOLOGY CLINIC | Facility: CLINIC | Age: 86
End: 2021-07-15
Payer: MEDICARE

## 2021-07-15 VITALS
HEIGHT: 62 IN | DIASTOLIC BLOOD PRESSURE: 70 MMHG | BODY MASS INDEX: 23.11 KG/M2 | SYSTOLIC BLOOD PRESSURE: 128 MMHG | OXYGEN SATURATION: 95 % | TEMPERATURE: 98.7 F | HEART RATE: 69 BPM | WEIGHT: 125.6 LBS

## 2021-07-15 DIAGNOSIS — I50.32 CHRONIC DIASTOLIC CONGESTIVE HEART FAILURE (HCC): ICD-10-CM

## 2021-07-15 DIAGNOSIS — I10 BENIGN ESSENTIAL HYPERTENSION: ICD-10-CM

## 2021-07-15 DIAGNOSIS — I48.0 PAROXYSMAL ATRIAL FIBRILLATION (HCC): Primary | ICD-10-CM

## 2021-07-15 PROCEDURE — 93000 ELECTROCARDIOGRAM COMPLETE: CPT | Performed by: INTERNAL MEDICINE

## 2021-07-15 PROCEDURE — 99214 OFFICE O/P EST MOD 30 MIN: CPT | Performed by: INTERNAL MEDICINE

## 2021-07-15 NOTE — PROGRESS NOTES
Cardiology Follow Up    Lucie Franco  1933  [id number]      Interval History: Lucie Franco is here for follow up of atrial fibrillation  Since her last visit, she continues to have rectal bleeding  Her Xarelto was reduced to 15 mg daily  She had a colonoscopy done which showed internal and external hemorrhoids along with severe diverticula but study could not be completed due to poor bowel prep  She is not scheduled for repeat study  She denies any shortness of breath, chest pain, palpitations, syncope or near syncope  She has no recent atrial fibrillation episodes  Her last episode occurred in in January 2019 during admission for pneumonia  Complicating pneumonia was atrial fibrillation and pleural effusion  She converted to sinus rhythm spontaneously and was started on Xarelto afterwards  Echocardiogram showed normal ejection fraction with diastolic dysfunction  The following portions of the patient's history were reviewed and updated as appropriate: She  has a past medical history of A-fib ([facility] [address] ), 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 Community acquired pneumonia, Hyperlipidemia, Hypertension, Irregular heart beat, Kidney stone, Lung abnormality, and Macular degeneration  She  has a past surgical history that includes Hysterectomy; Tumor removal (N/A, 2000); Colon surgery; Bowel resection; Cataract extraction, extracapsular w/ intraocular lens implant (Bilateral); Tracheal surgery; Appendectomy; Colonoscopy (N/A, 8/5/2016); Colectomy; Throat surgery; and Eye surgery  Her family history includes Diabetes in her sister; Hypertension in her mother and sister; Stroke in her mother  She  reports that she has never smoked  She has never used smokeless tobacco  She reports that she does not drink alcohol and does not use drugs    Current Outpatient Medications   Medication Sig Dispense Refill    alendronate (FOSAMAX) 70 mg tablet TAKE ONE TABLET BY MOUTH ONCE EVERY WEEK 12 tablet 3    ezetimibe-simvastatin (VYTORIN) 10-20 mg per tablet TAKE ONE TABLET BY MOUTH AT BEDTIME 90 tablet 3    hydrochlorothiazide (HYDRODIURIL) 25 mg tablet TAKE ONE TABLET BY MOUTH EVERY DAY (GENERIC FOR HYDRODIURIL) 90 tablet 3    hydrocortisone-pramoxine (ANALPRAM-HC) 2 5-1 % rectal cream Apply topically 3 (three) times a day 30 g 0    metoprolol tartrate (LOPRESSOR) 25 mg tablet Take 1 tablet (25 mg total) by mouth 2 (two) times a day 180 tablet 3    rivaroxaban (XARELTO) 15 mg tablet Take 1 tablet (15 mg total) by mouth daily with breakfast (Patient taking differently: Take 15 mg by mouth daily with breakfast Takes with dinner) 30 tablet 5     No current facility-administered medications for this visit  She is allergic to azithromycin, niacin and related, and keflex [cephalexin]         Review of Systems:  Review of Systems   Constitutional: Negative for fatigue  Respiratory: Negative for shortness of breath  Cardiovascular: Negative for chest pain, palpitations and leg swelling  Gastrointestinal: Positive for blood in stool  All other systems reviewed and are negative  Physical Exam:  /70 (BP Location: Left arm, Patient Position: Sitting, Cuff Size: Standard)   Pulse 69   Temp 98 7 °F (37 1 °C) (Temporal)   Ht 5' 2" (1 575 m)   Wt 57 kg (125 lb 9 6 oz)   SpO2 95%   BMI 22 97 kg/m²     Physical Exam  Constitutional:       General: She is not in acute distress  Appearance: She is well-developed  She is not diaphoretic  HENT:      Head: Normocephalic and atraumatic  Eyes:      Conjunctiva/sclera: Conjunctivae normal       Pupils: Pupils are equal, round, and reactive to light  Neck:      Thyroid: No thyromegaly  Vascular: No JVD  Cardiovascular:      Rate and Rhythm: Normal rate and regular rhythm  Heart sounds: Normal heart sounds  No murmur heard  No friction rub  No gallop      Pulmonary:      Effort: Pulmonary effort is normal  Breath sounds: Normal breath sounds  Musculoskeletal:      Cervical back: Neck supple  Skin:     General: Skin is warm and dry  Findings: No erythema or rash  Neurological:      Mental Status: She is alert and oriented to person, place, and time  Cranial Nerves: No cranial nerve deficit  Psychiatric:         Behavior: Behavior normal          Thought Content: Thought content normal          Judgment: Judgment normal          Cardiographics  ECG:  Normal sinus rhythm  rate of 70 beats per min    Labs:  Lab Results   Component Value Date     12/27/2013    K 3 8 04/13/2021    K 3 9 12/27/2013     04/13/2021     12/27/2013    CO2 31 04/13/2021    CO2 33 12/27/2013    BUN 13 04/13/2021    BUN 15 12/27/2013    CREATININE 0 65 04/13/2021    CREATININE 0 7 12/27/2013    CALCIUM 8 5 04/13/2021    CALCIUM 8 9 12/27/2013     Lab Results   Component Value Date    WBC 6 69 04/13/2021    HGB 13 0 04/13/2021    HCT 40 1 04/13/2021    MCV 92 04/13/2021     04/13/2021     Lab Results   Component Value Date    CHOL 128 12/27/2013    TRIG 112 04/07/2021    TRIG 155 12/27/2013    HDL 46 04/07/2021    HDL 39 12/27/2013      Discussion/Summary:  1  Paroxysmal atrial fibrillation (HCC)    2  Chronic diastolic congestive heart failure ([address] )    3  Benign essential hypertension      - she remains in sinus rhythm  She is on Xarelto to prevent CVA  Discussed anticoagulation with her in detail including risk and benefit  She continues to have rectal bleeding in spite of reduction of Xarelto dose  She had colonoscopy done recently which was limited due to poor prep  - cough stopped with discontinuation of lisinopril   - discuss increase in fiber intake to prevent constipation and hemorrhoids

## 2021-09-07 ENCOUNTER — OFFICE VISIT (OUTPATIENT)
Dept: PULMONOLOGY | Facility: MEDICAL CENTER | Age: 86
End: 2021-09-07
Payer: MEDICARE

## 2021-09-07 VITALS
RESPIRATION RATE: 12 BRPM | OXYGEN SATURATION: 96 % | WEIGHT: 127 LBS | HEIGHT: 62 IN | SYSTOLIC BLOOD PRESSURE: 128 MMHG | DIASTOLIC BLOOD PRESSURE: 60 MMHG | BODY MASS INDEX: 23.37 KG/M2 | TEMPERATURE: 98.7 F | HEART RATE: 56 BPM

## 2021-09-07 DIAGNOSIS — R06.00 DYSPNEA ON EXERTION: ICD-10-CM

## 2021-09-07 DIAGNOSIS — R91.1 LUNG NODULE: ICD-10-CM

## 2021-09-07 DIAGNOSIS — J43.2 CENTRILOBULAR EMPHYSEMA (HCC): Primary | ICD-10-CM

## 2021-09-07 PROCEDURE — 99214 OFFICE O/P EST MOD 30 MIN: CPT | Performed by: INTERNAL MEDICINE

## 2021-09-07 PROCEDURE — 94010 BREATHING CAPACITY TEST: CPT | Performed by: INTERNAL MEDICINE

## 2021-09-25 NOTE — ASSESSMENT & PLAN NOTE
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 although she never smoked  Spirometry today shows FEV1 is 0 74 liters or 51% of predicted with obstructive index of 55%    Compared to complete PFT done March 2019 FEV1 slightly more decreased  Previously was 1 06 liters or 59% of predicted  She did have moderate to severe air trapping then and total lung capacity was 113% of predicted    She never smoked  She does not really complain of shortness of breath with doing her usual activities  She has nebulizer home with DuoNeb but has not needed to use it  She did not want any regular inhaler therapy or any type of rescue inhalers as she feels she would not use it  His she changes her mind I can always give her samples of Spiriva    Again she did not want anything at this time

## 2021-09-25 NOTE — ASSESSMENT & PLAN NOTE
She has some mild exertional dyspnea but overall is doing well did not feel like she needed any inhaler therapy    She does have some cardiac diastolic dysfunction as have paroxysmal atrial fibrillation

## 2021-09-25 NOTE — ASSESSMENT & PLAN NOTE
Ivone Rodriguez does have some nodular densities see mostly in right lower lobe on CT scan done in December 2018 which were inflammatory  I did review CT scan of her abdomen that was done this April and this included about a 3rd of lower lung fields  These inflammatory changes have resolved    She does have a large left kidney cyst

## 2021-09-25 NOTE — PROGRESS NOTES
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 although she never smoked  Spirometry today shows FEV1 is 0 74 liters or 51% of predicted with obstructive index of 55%    Compared to complete PFT done March 2019 FEV1 slightly more decreased  Previously was 1 06 liters or 59% of predicted  She did have moderate to severe air trapping then and total lung capacity was 113% of predicted    She never smoked  She does not really complain of shortness of breath with doing her usual activities  She has nebulizer home with DuoNeb but has not needed to use it  She did not want any regular inhaler therapy or any type of rescue inhalers as she feels she would not use it  His she changes her mind I can always give her samples of Spiriva  Again she did not want anything at this time            Other    Lung nodule     Leroy Barnett does have some nodular densities see mostly in right lower lobe on CT scan done in December 2018 which were inflammatory  I did review CT scan of her abdomen that was done this April and this included about a 3rd of lower lung fields  These inflammatory changes have resolved  She does have a large left kidney cyst         Dyspnea on exertion     She has some mild exertional dyspnea but overall is doing well did not feel like she needed any inhaler therapy  She does have some cardiac diastolic dysfunction as have paroxysmal atrial fibrillation         Relevant Orders    POCT spirometry (Completed)            Follow-up  Doing well  Only mild shortness of breath with activity      HPI     Monse has moderate COPD with FEV1 1 06 liters or 59 percent predicted  She has nebulizer with DuoNeb but does not use it on regular basis  She never smoked    She has history of a 13 millimeter peripheral right upper lobe nodular density which had not changed much on subsequent CT scan done December 2019  She does get short of breath was activity  Does not have any chronic cough  She has history colon cancer and did have hemicolectomy for this in the past   Also has history of hypertension  She never smoked    She does have history of paroxysmal atrial fibrillation and had been on Xarelto 15 milligram per day  States she did have a rectal bleed earlier in the year and was taken off Xarelto  She also had has some diastolic cardiac dysfunction  Last echocardiogram done in January 2019 showed normal left ventricular systolic function, mild mitral and aortic valve regurgitation and estimated PA pressure was mildly increased at 44 millimeters Hg  She is not have any nocturnal dyspnea  She does have history of hypertension    She did have some recent rectal bleeding in April  She has some clots seen in the toilet  She did undergo colonoscopy on May 20th which did not show any visible bleeding  There were some internal and external hemorrhoids and evidence of diverticulosis  There is severe to diverticuli the sigmoid colon  Last CBC done on April 13 showed hemoglobin 13 0    Past Medical History:   Diagnosis Date    A-fib [facility])     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[address] )     Community acquired pneumonia     last assessed: 10/11/16    Hyperlipidemia     Hypertension     Irregular heart beat     Kidney stone     Lung abnormality     Macular degeneration        Past Surgical History:   Procedure Laterality Date    APPENDECTOMY      BOWEL RESECTION      CATARACT EXTRACTION EXTRACAPSULAR W/ INTRAOCULAR LENS IMPLANTATION Bilateral     COLECTOMY      Partial    COLON SURGERY      colon cancer    COLONOSCOPY N/A 8/5/2016    Procedure: COLONOSCOPY;  Surgeon: Benedicto Beltran MD;  Location: [facility] GI LAB;   Service:    Mary Martínez EYE SURGERY      HYSTERECTOMY      THROAT SURGERY      TRACHEAL SURGERY      tumor removed benign    TUMOR REMOVAL N/A 2000    trachea         Current Outpatient Medications:    alendronate (FOSAMAX) 70 mg tablet, TAKE ONE TABLET BY MOUTH ONCE EVERY WEEK, Disp: 12 tablet, Rfl: 3    ezetimibe-simvastatin (VYTORIN) 10-20 mg per tablet, TAKE ONE TABLET BY MOUTH AT BEDTIME, Disp: 90 tablet, Rfl: 3    hydrochlorothiazide (HYDRODIURIL) 25 mg tablet, TAKE ONE TABLET BY MOUTH EVERY DAY (GENERIC FOR HYDRODIURIL), Disp: 90 tablet, Rfl: 3    hydrocortisone-pramoxine (ANALPRAM-HC) 2 5-1 % rectal cream, Apply topically 3 (three) times a day, Disp: 30 g, Rfl: 0    metoprolol tartrate (LOPRESSOR) 25 mg tablet, Take 1 tablet (25 mg total) by mouth 2 (two) times a day, Disp: 180 tablet, Rfl: 3    Allergies   Allergen Reactions    Azithromycin     Niacin And Related     Keflex [Cephalexin] Rash       Social History     Tobacco Use    Smoking status: Never Smoker    Smokeless tobacco: Never Used   Substance Use Topics    Alcohol use: No         Family History   Problem Relation Age of Onset    Diabetes Sister     Hypertension Sister     Stroke Mother     Hypertension Mother        Review of Systems   Constitutional: Negative for chills, fever and unexpected weight change  HENT: Negative for congestion, rhinorrhea and sore throat  Eyes: Negative for discharge and redness  Respiratory:        Has only mild shortness of breath with exertional activity   Cardiovascular: Negative for chest pain, palpitations and leg swelling  Gastrointestinal: Negative for abdominal distention, abdominal pain, blood in stool and nausea  Endocrine: Negative for polydipsia and polyphagia  Genitourinary: Negative for dysuria  Musculoskeletal: Negative for joint swelling and myalgias  Skin: Negative for rash  Neurological: Negative for light-headedness  Psychiatric/Behavioral: Negative for confusion             Vitals:    09/07/21 1124   BP: 128/60   Pulse: 56   Resp: 12   Temp: 98 7 °F (37 1 °C)   SpO2: 96%       Height is 5 feet 2 inches tall, weight 127 pounds, BMI 23 23    Physical Exam  Vitals reviewed  Constitutional:       General: She is not in acute distress  Appearance: Normal appearance  She is well-developed  HENT:      Head: Normocephalic  Right Ear: External ear normal       Left Ear: External ear normal       Nose: Nose normal       Mouth/Throat:      Mouth: Mucous membranes are moist       Pharynx: Oropharynx is clear  No oropharyngeal exudate  Eyes:      Conjunctiva/sclera: Conjunctivae normal       Pupils: Pupils are equal, round, and reactive to light  Cardiovascular:      Rate and Rhythm: Normal rate and regular rhythm  Heart sounds: Normal heart sounds  Pulmonary:      Effort: Pulmonary effort is normal       Comments: Lung sounds are clear  No wheezes crackles or rhonchi  Abdominal:      General: There is no distension  Palpations: Abdomen is soft  Tenderness: There is no abdominal tenderness  Musculoskeletal:      Cervical back: Neck supple  Comments: No edema, cyanosis or clubbing   Lymphadenopathy:      Cervical: No cervical adenopathy  Skin:     General: Skin is warm and dry  Neurological:      Mental Status: She is alert and oriented to person, place, and time  Psychiatric:         Mood and Affect: Mood normal          Behavior: Behavior normal          Thought Content:  Thought content normal            Office Spirometry Results:  Done today    FVC - 1 35 L    67%  FEV1 - 0 74 L   51%  FEV1/FVC% - 55%    Moderate to severe airflow obstruction

## 2021-10-30 DIAGNOSIS — I48.91 ATRIAL FIBRILLATION WITH RAPID VENTRICULAR RESPONSE (HCC): ICD-10-CM

## 2021-11-12 ENCOUNTER — IMMUNIZATIONS (OUTPATIENT)
Dept: FAMILY MEDICINE CLINIC | Facility: HOSPITAL | Age: 86
End: 2021-11-12

## 2021-11-12 DIAGNOSIS — Z23 ENCOUNTER FOR IMMUNIZATION: Primary | ICD-10-CM

## 2021-11-12 PROCEDURE — 91306 COVID-19 MODERNA VACC 0.25 ML BOOSTER: CPT

## 2021-11-12 PROCEDURE — 0013A COVID-19 MODERNA VACC 0.25 ML BOOSTER: CPT

## 2022-01-07 ENCOUNTER — TELEPHONE (OUTPATIENT)
Dept: OTHER | Facility: OTHER | Age: 87
End: 2022-01-07

## 2022-01-07 NOTE — TELEPHONE ENCOUNTER
Patient called to cancel her appointment today 1/7/22 @ 10am due to her ride canceling because of the weather  The Patient would like a call back to reschedule

## 2022-02-16 ENCOUNTER — TELEPHONE (OUTPATIENT)
Dept: FAMILY MEDICINE CLINIC | Facility: CLINIC | Age: 87
End: 2022-02-16

## 2022-03-17 ENCOUNTER — OFFICE VISIT (OUTPATIENT)
Dept: FAMILY MEDICINE CLINIC | Facility: CLINIC | Age: 87
End: 2022-03-17
Payer: MEDICARE

## 2022-03-17 ENCOUNTER — LAB (OUTPATIENT)
Dept: LAB | Facility: CLINIC | Age: 87
End: 2022-03-17
Payer: MEDICARE

## 2022-03-17 VITALS
SYSTOLIC BLOOD PRESSURE: 120 MMHG | WEIGHT: 124 LBS | HEART RATE: 68 BPM | TEMPERATURE: 97.2 F | HEIGHT: 62 IN | DIASTOLIC BLOOD PRESSURE: 70 MMHG | RESPIRATION RATE: 16 BRPM | BODY MASS INDEX: 22.82 KG/M2

## 2022-03-17 DIAGNOSIS — E78.49 OTHER HYPERLIPIDEMIA: ICD-10-CM

## 2022-03-17 DIAGNOSIS — Z00.00 MEDICARE ANNUAL WELLNESS VISIT, SUBSEQUENT: ICD-10-CM

## 2022-03-17 DIAGNOSIS — M81.0 OSTEOPOROSIS, UNSPECIFIED OSTEOPOROSIS TYPE, UNSPECIFIED PATHOLOGICAL FRACTURE PRESENCE: ICD-10-CM

## 2022-03-17 DIAGNOSIS — I10 BENIGN ESSENTIAL HYPERTENSION: ICD-10-CM

## 2022-03-17 DIAGNOSIS — I10 BENIGN ESSENTIAL HYPERTENSION: Primary | ICD-10-CM

## 2022-03-17 LAB
ALBUMIN SERPL BCP-MCNC: 3.5 G/DL (ref 3.5–5)
ALP SERPL-CCNC: 70 U/L (ref 46–116)
ALT SERPL W P-5'-P-CCNC: 19 U/L (ref 12–78)
ANION GAP SERPL CALCULATED.3IONS-SCNC: 4 MMOL/L (ref 4–13)
AST SERPL W P-5'-P-CCNC: 24 U/L (ref 5–45)
BILIRUB SERPL-MCNC: 1.45 MG/DL (ref 0.2–1)
BUN SERPL-MCNC: 14 MG/DL (ref 5–25)
CALCIUM SERPL-MCNC: 9.5 MG/DL (ref 8.3–10.1)
CHLORIDE SERPL-SCNC: 102 MMOL/L (ref 100–108)
CHOLEST SERPL-MCNC: 169 MG/DL
CO2 SERPL-SCNC: 32 MMOL/L (ref 21–32)
CREAT SERPL-MCNC: 0.64 MG/DL (ref 0.6–1.3)
GFR SERPL CREATININE-BSD FRML MDRD: 79 ML/MIN/1.73SQ M
GLUCOSE P FAST SERPL-MCNC: 109 MG/DL (ref 65–99)
HDLC SERPL-MCNC: 39 MG/DL
LDLC SERPL CALC-MCNC: 98 MG/DL (ref 0–100)
NONHDLC SERPL-MCNC: 130 MG/DL
POTASSIUM SERPL-SCNC: 4.3 MMOL/L (ref 3.5–5.3)
PROT SERPL-MCNC: 8.2 G/DL (ref 6.4–8.2)
SODIUM SERPL-SCNC: 138 MMOL/L (ref 136–145)
TRIGL SERPL-MCNC: 160 MG/DL

## 2022-03-17 PROCEDURE — 36415 COLL VENOUS BLD VENIPUNCTURE: CPT

## 2022-03-17 PROCEDURE — 80061 LIPID PANEL: CPT

## 2022-03-17 PROCEDURE — G0439 PPPS, SUBSEQ VISIT: HCPCS | Performed by: FAMILY MEDICINE

## 2022-03-17 PROCEDURE — 99214 OFFICE O/P EST MOD 30 MIN: CPT | Performed by: FAMILY MEDICINE

## 2022-03-17 PROCEDURE — 80053 COMPREHEN METABOLIC PANEL: CPT

## 2022-03-17 RX ORDER — DIMENHYDRINATE 50 MG
TABLET ORAL
COMMUNITY

## 2022-03-17 NOTE — PROGRESS NOTES
Chief Complaint   Patient presents with    Medicare Wellness Visit    Follow-up     chronic conditions        Patient ID: Homero Tran is a 80 y o  female  HPI  Pt is seeing for f/u HTN, HLD, Osteoporosis  -  All stable  -  Taking meds    The following portions of the patient's history were reviewed and updated as appropriate: allergies, current medications, past family history, past medical history, past social history, past surgical history and problem list     Review of Systems   Constitutional: Negative  Respiratory: Negative  Cardiovascular: Negative  Gastrointestinal: Negative  Genitourinary: Negative  Musculoskeletal: Negative  Skin: Negative  Neurological: Negative  Psychiatric/Behavioral: Negative  Current Outpatient Medications   Medication Sig Dispense Refill    alendronate (FOSAMAX) 70 mg tablet TAKE ONE TABLET BY MOUTH ONCE EVERY WEEK 12 tablet 3    ezetimibe-simvastatin (VYTORIN) 10-20 mg per tablet TAKE ONE TABLET BY MOUTH AT BEDTIME 90 tablet 3    Flaxseed, Linseed, (Flax Seed Oil) 1000 MG CAPS Take by mouth      hydrochlorothiazide (HYDRODIURIL) 25 mg tablet TAKE ONE TABLET BY MOUTH EVERY DAY (GENERIC FOR HYDRODIURIL) 90 tablet 3    hydrocortisone-pramoxine (ANALPRAM-HC) 2 5-1 % rectal cream Apply topically 3 (three) times a day 30 g 0    metoprolol tartrate (LOPRESSOR) 25 mg tablet TAKE ONE TABLET BY MOUTH TWICE A DAY (LOPRESSOR GENERIC) 180 tablet 3     No current facility-administered medications for this visit  Objective:    /70   Pulse 68   Temp (!) 97 2 °F (36 2 °C)   Resp 16   Ht 5' 2" (1 575 m)   Wt 56 2 kg (124 lb)   BMI 22 68 kg/m²        Physical Exam  Constitutional:       General: She is not in acute distress  Appearance: She is not ill-appearing  Cardiovascular:      Rate and Rhythm: Normal rate and regular rhythm  Heart sounds: No murmur heard        Pulmonary:      Effort: Pulmonary effort is normal  No respiratory distress  Breath sounds: No wheezing, rhonchi or rales  Musculoskeletal:      Right lower leg: No edema  Left lower leg: No edema  Neurological:      General: No focal deficit present  Mental Status: She is alert and oriented to person, place, and time  Labs in chart were reviewed  Assessment/Plan:         Diagnoses and all orders for this visit:    Benign essential hypertension  -     Comprehensive metabolic panel; Future    Medicare annual wellness visit, subsequent    Other hyperlipidemia  -     Lipid panel; Future    Osteoporosis, unspecified osteoporosis type, unspecified pathological fracture presence  -     DXA bone density spine hip and pelvis; Future    Other orders  -     Flaxseed, Linseed, (Flax Seed Oil) 1000 MG CAPS;  Take by mouth          rto in 6 wks                   Brain Gurrola MD

## 2022-03-17 NOTE — PATIENT INSTRUCTIONS
Medicare Preventive Visit Patient Instructions  Thank you for completing your Welcome to Medicare Visit or Medicare Annual Wellness Visit today  Your next wellness visit will be due in one year (3/18/2023)  The screening/preventive services that you may require over the next 5-10 years are detailed below  Some tests may not apply to you based off risk factors and/or age  Screening tests ordered at today's visit but not completed yet may show as past due  Also, please note that scanned in results may not display below  Preventive Screenings:  Service Recommendations Previous Testing/Comments   Colorectal Cancer Screening  * Colonoscopy    * Fecal Occult Blood Test (FOBT)/Fecal Immunochemical Test (FIT)  * Fecal DNA/Cologuard Test  * Flexible Sigmoidoscopy Age: 54-65 years old   Colonoscopy: every 10 years (may be performed more frequently if at higher risk)  OR  FOBT/FIT: every 1 year  OR  Cologuard: every 3 years  OR  Sigmoidoscopy: every 5 years  Screening may be recommended earlier than age 48 if at higher risk for colorectal cancer  Also, an individualized decision between you and your healthcare provider will decide whether screening between the ages of 74-80 would be appropriate  Colonoscopy: 07/01/2021  FOBT/FIT: Not on file  Cologuard: Not on file  Sigmoidoscopy: Not on file          Breast Cancer Screening Age: 36 years old  Frequency: every 1-2 years  Not required if history of left and right mastectomy Mammogram: Not on file        Cervical Cancer Screening Between the ages of 21-29, pap smear recommended once every 3 years  Between the ages of 33-67, can perform pap smear with HPV co-testing every 5 years     Recommendations may differ for women with a history of total hysterectomy, cervical cancer, or abnormal pap smears in past  Pap Smear: Not on file        Hepatitis C Screening Once for adults born between [facility]  More frequently in patients at high risk for Hepatitis C Hep C Antibody: Not on file        Diabetes Screening 1-2 times per year if you're at risk for diabetes or have pre-diabetes Fasting glucose: 103 mg/dL   A1C: No results in last 5 years        Cholesterol Screening Once every 5 years if you don't have a lipid disorder  May order more often based on risk factors  Lipid panel: 04/07/2021          Other Preventive Screenings Covered by Medicare:  1  Abdominal Aortic Aneurysm (AAA) Screening: covered once if your at risk  You're considered to be at risk if you have a family history of AAA  2  Lung Cancer Screening: covers low dose CT scan once per year if you meet all of the following conditions: (1) Age 50-69; (2) No signs or symptoms of lung cancer; (3) Current smoker or have quit smoking within the last 15 years; (4) You have a tobacco smoking history of at least 30 pack years (packs per day multiplied by number of years you smoked); (5) You get a written order from a healthcare provider  3  Glaucoma Screening: covered annually if you're considered high risk: (1) You have diabetes OR (2) Family history of glaucoma OR (3)  aged 48 and older OR (3)  American aged 72 and older  3  Osteoporosis Screening: covered every 2 years if you meet one of the following conditions: (1) You're estrogen deficient and at risk for osteoporosis based off medical history and other findings; (2) Have a vertebral abnormality; (3) On glucocorticoid therapy for more than 3 months; (4) Have primary hyperparathyroidism; (5) On osteoporosis medications and need to assess response to drug therapy  · Last bone density test (DXA Scan): 05/01/2018  5  HIV Screening: covered annually if you're between the age of 12-76  Also covered annually if you are younger than 13 and older than 72 with risk factors for HIV infection  For pregnant patients, it is covered up to 3 times per pregnancy      Immunizations:  Immunization Recommendations   Influenza Vaccine Annual influenza vaccination during flu season is recommended for all persons aged >= 6 months who do not have contraindications   Pneumococcal Vaccine (Prevnar and Pneumovax)  * Prevnar = PCV13  * Pneumovax = PPSV23   Adults 25-60 years old: 1-3 doses may be recommended based on certain risk factors  Adults 72 years old: Prevnar (PCV13) vaccine recommended followed by Pneumovax (PPSV23) vaccine  If already received PPSV23 since turning 65, then PCV13 recommended at least one year after PPSV23 dose  Hepatitis B Vaccine 3 dose series if at intermediate or high risk (ex: diabetes, end stage renal disease, liver disease)   Tetanus (Td) Vaccine - COST NOT COVERED BY MEDICARE PART B Following completion of primary series, a booster dose should be given every 10 years to maintain immunity against tetanus  Td may also be given as tetanus wound prophylaxis  Tdap Vaccine - COST NOT COVERED BY MEDICARE PART B Recommended at least once for all adults  For pregnant patients, recommended with each pregnancy  Shingles Vaccine (Shingrix) - COST NOT COVERED BY MEDICARE PART B  2 shot series recommended in those aged 48 and above     Health Maintenance Due:  There are no preventive care reminders to display for this patient  Immunizations Due:      Topic Date Due    DTaP,Tdap,and Td Vaccines (2 - Td or Tdap) 04/05/2017    Influenza Vaccine (1) 09/01/2021     Advance Directives   What are advance directives? Advance directives are legal documents that state your wishes and plans for medical care  These plans are made ahead of time in case you lose your ability to make decisions for yourself  Advance directives can apply to any medical decision, such as the treatments you want, and if you want to donate organs  What are the types of advance directives? There are many types of advance directives, and each state has rules about how to use them  You may choose a combination of any of the following:  · Living will:   This is a written record of the treatment you want  You can also choose which treatments you do not want, which to limit, and which to stop at a certain time  This includes surgery, medicine, IV fluid, and tube feedings  · Durable power of  for healthcare [facility]): This is a written record that states who you want to make healthcare choices for you when you are unable to make them for yourself  This person, called a proxy, is usually a family member or a friend  You may choose more than 1 proxy  · Do not resuscitate (DNR) order:  A DNR order is used in case your heart stops beating or you stop breathing  It is a request not to have certain forms of treatment, such as CPR  A DNR order may be included in other types of advance directives  · Medical directive: This covers the care that you want if you are in a coma, near death, or unable to make decisions for yourself  You can list the treatments you want for each condition  Treatment may include pain medicine, surgery, blood transfusions, dialysis, IV or tube feedings, and a ventilator (breathing machine)  · Values history: This document has questions about your views, beliefs, and how you feel and think about life  This information can help others choose the care that you would choose  Why are advance directives important? An advance directive helps you control your care  Although spoken wishes may be used, it is better to have your wishes written down  Spoken wishes can be misunderstood, or not followed  Treatments may be given even if you do not want them  An advance directive may make it easier for your family to make difficult choices about your care  Urinary Incontinence   Urinary incontinence (UI)  is when you lose control of your bladder  UI develops because your bladder cannot store or empty urine properly  The 3 most common types of UI are stress incontinence, urge incontinence, or both  Medicines:   · May be given to help strengthen your bladder control   Report any side effects of medication to your healthcare provider  Do pelvic muscle exercises often:  Your pelvic muscles help you stop urinating  Squeeze these muscles tight for 5 seconds, then relax for 5 seconds  Gradually work up to squeezing for 10 seconds  Do 3 sets of 15 repetitions a day, or as directed  This will help strengthen your pelvic muscles and improve bladder control  Train your bladder:  Go to the bathroom at set times, such as every 2 hours, even if you do not feel the urge to go  You can also try to hold your urine when you feel the urge to go  For example, hold your urine for 5 minutes when you feel the urge to go  As that becomes easier, hold your urine for 10 minutes  Self-care:   · Keep a UI record  Write down how often you leak urine and how much you leak  Make a note of what you were doing when you leaked urine  · Drink liquids as directed  You may need to limit the amount of liquid you drink to help control your urine leakage  Do not drink any liquid right before you go to bed  Limit or do not have drinks that contain caffeine or alcohol  · Prevent constipation  Eat a variety of high-fiber foods  Good examples are high-fiber cereals, beans, vegetables, and whole-grain breads  Walking is the best way to trigger your intestines to have a bowel movement  · Exercise regularly and maintain a healthy weight  Weight loss and exercise will decrease pressure on your bladder and help you control your leakage  · Use a catheter as directed  to help empty your bladder  A catheter is a tiny, plastic tube that is put into your bladder to drain your urine  · Go to behavior therapy as directed  Behavior therapy may be used to help you learn to control your urge to urinate  © Copyright [employer] 2018 Information is for End User's use only and may not be sold, redistributed or otherwise used for commercial purposes   All illustrations and images included in CareNotes® are the copyrighted property of A D A M , [employer]  or [address] [place] [address]

## 2022-03-17 NOTE — PROGRESS NOTES
Assessment and Plan:     Problem List Items Addressed This Visit        Cardiovascular and Mediastinum    Benign essential hypertension - Primary    Relevant Orders    Comprehensive metabolic panel       Musculoskeletal and Integument    Osteoporosis    Relevant Orders    DXA bone density spine hip and pelvis       Other    Other hyperlipidemia    Relevant Orders    Lipid panel      Other Visit Diagnoses     Medicare annual wellness visit, subsequent               Preventive health issues were discussed with patient, and age appropriate screening tests were ordered as noted in patient's After Visit Summary  Personalized health advice and appropriate referrals for health education or preventive services given if needed, as noted in patient's After Visit Summary       History of Present Illness:     Patient presents for Medicare Annual Wellness visit    Patient Care Team:  Denzel Lozano MD as PCP - General  MD Radha Pace MD as Endoscopist  Julio Barragan DO (Cardiology)     Problem List:     Patient Active Problem List   Diagnosis    Benign essential hypertension    Other hyperlipidemia    Macular degeneration    Post-menopausal osteoporosis    Pleural effusion on right    Mediastinal adenopathy    Renal cyst    Thrombocytosis    Paroxysmal atrial fibrillation (HCC)    Centrilobular emphysema ([address] )    Lung nodule    Chronic diastolic congestive heart failure (HCC)    Dyspnea on exertion    On continuous oral anticoagulation    History of colon cancer    Osteoporosis      Past Medical and Surgical History:     Past Medical History:   Diagnosis Date    A-fib [facility])     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[address] )     Community acquired pneumonia     last assessed: 10/11/16    Hyperlipidemia     Hypertension     Irregular heart beat     Kidney stone     Lung abnormality     Macular degeneration      Past Surgical History:   Procedure Laterality Date    APPENDECTOMY      BOWEL RESECTION      CATARACT EXTRACTION EXTRACAPSULAR W/ INTRAOCULAR LENS IMPLANTATION Bilateral     COLECTOMY      Partial    COLON SURGERY      colon cancer    COLONOSCOPY N/A 8/5/2016    Procedure: COLONOSCOPY;  Surgeon: Kaitlin Perez MD;  Location: [facility] GI LAB; Service:    [place] EYE SURGERY      HYSTERECTOMY      THROAT SURGERY      TRACHEAL SURGERY      tumor removed benign    TUMOR REMOVAL N/A 2000    trachea      Family History:     Family History   Problem Relation Age of Onset    Diabetes Sister     Hypertension Sister    [place] Stroke Mother     Hypertension Mother       Social History:     Social History     Socioeconomic History    Marital status:       Spouse name: None    Number of children: None    Years of education: None    Highest education level: None   Occupational History    None   Tobacco Use    Smoking status: Never Smoker    Smokeless tobacco: Never Used   Vaping Use    Vaping Use: Never used   Substance and Sexual Activity    Alcohol use: No    Drug use: No    Sexual activity: None     Comment: NOT ASKED   Other Topics Concern    None   Social History Narrative    Daily tea consumption ( 1 cups/day)     Social Determinants of Health     Financial Resource Strain: Not on file   Food Insecurity: Not on file   Transportation Needs: Not on file   Physical Activity: Not on file   Stress: Not on file   Social Connections: Not on file   Intimate Partner Violence: Not on file   Housing Stability: Not on file      Medications and Allergies:     Current Outpatient Medications   Medication Sig Dispense Refill    alendronate (FOSAMAX) 70 mg tablet TAKE ONE TABLET BY MOUTH ONCE EVERY WEEK 12 tablet 3    ezetimibe-simvastatin (VYTORIN) 10-20 mg per tablet TAKE ONE TABLET BY MOUTH AT BEDTIME 90 tablet 3    Flaxseed, Linseed, (Flax Seed Oil) 1000 MG CAPS Take by mouth      hydrochlorothiazide (HYDRODIURIL) 25 mg tablet TAKE ONE TABLET BY MOUTH EVERY DAY (GENERIC FOR HYDRODIURIL) 90 tablet 3    hydrocortisone-pramoxine (ANALPRAM-HC) 2 5-1 % rectal cream Apply topically 3 (three) times a day 30 g 0    metoprolol tartrate (LOPRESSOR) 25 mg tablet TAKE ONE TABLET BY MOUTH TWICE A DAY (LOPRESSOR GENERIC) 180 tablet 3     No current facility-administered medications for this visit  Allergies   Allergen Reactions    Azithromycin     Niacin And Related     Keflex [Cephalexin] Rash      Immunizations:     Immunization History   Administered Date(s) Administered    COVID-19 MODERNA VACC 0 25 ML IM BOOSTER 11/12/2021    COVID-19 MODERNA VACC 0 5 ML IM 02/12/2021, 03/12/2021    Influenza Split High Dose Preservative Free IM 10/09/2012, 11/03/2014, 10/11/2016, 10/13/2017    Influenza, high dose seasonal 0 7 mL 10/15/2018, 09/16/2019, 12/03/2020    Influenza, seasonal, injectable 10/18/2006, 10/10/2007, 10/31/2008, 10/12/2010, 10/10/2011, 12/27/2013    Pneumococcal Conjugate 13-Valent 06/29/2015    Pneumococcal Polysaccharide PPV23 06/24/2013    Tdap 04/05/2007      Health Maintenance: There are no preventive care reminders to display for this patient  Topic Date Due    DTaP,Tdap,and Td Vaccines (2 - Td or Tdap) 04/05/2017    Influenza Vaccine (1) 09/01/2021      Medicare Health Risk Assessment:     /70   Pulse 68   Temp (!) 97 2 °F (36 2 °C)   Resp 16   Ht 5' 2" (1 575 m)   Wt 56 2 kg (124 lb)   BMI 22 68 kg/m²      Gwen Lares is here for her Subsequent Wellness visit  Health Risk Assessment:   Patient rates overall health as fair  Patient feels that their physical health rating is same  Patient is satisfied with their life  Eyesight was rated as much worse  Hearing was rated as same  Patient feels that their emotional and mental health rating is same  Patients states they are never, rarely angry  Patient states they are sometimes unusually tired/fatigued  Pain experienced in the last 7 days has been some  Patient's pain rating has been 1/10   Patient states that she has experienced no weight loss or gain in last 6 months  Depression Screening:   PHQ-2 Score: 1      Fall Risk Screening: In the past year, patient has experienced: no history of falling in past year      Urinary Incontinence Screening:   Patient has leaked urine accidently in the last six months  Home Safety:  Patient does not have trouble with stairs inside or outside of their home  Patient has working smoke alarms and has working carbon monoxide detector  Home safety hazards include: none  Nutrition:   Current diet is Low Cholesterol  Medications:   Patient is currently taking over-the-counter supplements  OTC medications include: see medication list  Patient is able to manage medications  Activities of Daily Living (ADLs)/Instrumental Activities of Daily Living (IADLs):   Walk and transfer into and out of bed and chair?: Yes  Dress and groom yourself?: Yes    Bathe or shower yourself?: Yes    Feed yourself? Yes  Do your laundry/housekeeping?: Yes  Manage your money, pay your bills and track your expenses?: No  Make your own meals?: Yes    Do your own shopping?: No    ADL comments: Family helping     Previous Hospitalizations:   Any hospitalizations or ED visits within the last 12 months?: No      Advance Care Planning:   Living will: Yes    Durable POA for healthcare:  Yes    Advanced directive: Yes      Cognitive Screening:   Provider or family/friend/caregiver concerned regarding cognition?: No    PREVENTIVE SCREENINGS      Cardiovascular Screening:    General: Screening Not Indicated and History Lipid Disorder    Due for: Lipid Panel      Diabetes Screening:     General: Screening Current    Due for: Blood Glucose      Colorectal Cancer Screening:     General: Screening Not Indicated      Breast Cancer Screening:     General: Screening Not Indicated      Cervical Cancer Screening:    General: Screening Not Indicated      Osteoporosis Screening:    General: Screening Not Indicated and History Osteoporosis    Due for: DXA Axial      Abdominal Aortic Aneurysm (AAA) Screening:        General: Screening Not Indicated      Lung Cancer Screening:     General: Screening Not Indicated      Hepatitis C Screening:    General: Screening Not Indicated    Screening, Brief Intervention, and Referral to Treatment (SBIRT)    Screening  Typical number of drinks in a day: 0  Typical number of drinks in a week: 0  Interpretation: Low risk drinking behavior      Single Item Drug Screening:  How often have you used an illegal drug (including marijuana) or a prescription medication for non-medical reasons in the past year? never    Single Item Drug Screen Score: 0  Interpretation: Negative screen for possible drug use disorder      Esvin Roberts MD

## 2022-04-01 ENCOUNTER — OFFICE VISIT (OUTPATIENT)
Dept: CARDIOLOGY CLINIC | Facility: CLINIC | Age: 87
End: 2022-04-01
Payer: MEDICARE

## 2022-04-01 VITALS
OXYGEN SATURATION: 97 % | HEIGHT: 62 IN | BODY MASS INDEX: 23.19 KG/M2 | WEIGHT: 126 LBS | HEART RATE: 70 BPM | TEMPERATURE: 98.2 F | DIASTOLIC BLOOD PRESSURE: 62 MMHG | SYSTOLIC BLOOD PRESSURE: 122 MMHG

## 2022-04-01 DIAGNOSIS — E78.49 OTHER HYPERLIPIDEMIA: ICD-10-CM

## 2022-04-01 DIAGNOSIS — I50.32 CHRONIC DIASTOLIC CONGESTIVE HEART FAILURE (HCC): ICD-10-CM

## 2022-04-01 DIAGNOSIS — I10 BENIGN ESSENTIAL HYPERTENSION: ICD-10-CM

## 2022-04-01 DIAGNOSIS — I48.0 PAROXYSMAL ATRIAL FIBRILLATION (HCC): Primary | ICD-10-CM

## 2022-04-01 PROCEDURE — 99214 OFFICE O/P EST MOD 30 MIN: CPT | Performed by: INTERNAL MEDICINE

## 2022-04-01 PROCEDURE — 93000 ELECTROCARDIOGRAM COMPLETE: CPT | Performed by: INTERNAL MEDICINE

## 2022-04-01 NOTE — PROGRESS NOTES
Cardiology Follow Up    Mona Stratton  1933  [id number]      Interval History: Mona Stratton is here for follow up of atrial fibrillation  During her last visit, she was having a large amount of rectal bleeding  Bleeding persisted in spite of reduction in Xarelto and it was subsequently discontinued after this visit  Bleeding has significantly improved  It occurs only intermittently now  She denies any shortness of breath, chest pain, syncope or near syncope  She has rare episodes of palpitations that last for a few seconds  She denies any recent sustained palpitations  No known atrial fibrillation episodes  EKG today shows sinus rhythm  Her last documented episode of atrial fibrillation occurred in January 2019 during admission for pneumonia  Complicating pneumonia was atrial fibrillation and pleural effusion  She converted to sinus rhythm spontaneously and was started on Xarelto afterwards  Echocardiogram showed normal ejection fraction with diastolic dysfunction  The following portions of the patient's history were reviewed and updated as appropriate: She  has a past medical history of A-fib [address] ), 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 ), Community acquired pneumonia, Hyperlipidemia, Hypertension, Irregular heart beat, Kidney stone, Lung abnormality, and Macular degeneration  She  has a past surgical history that includes Hysterectomy; Tumor removal (N/A, 2000); Colon surgery; Bowel resection; Cataract extraction, extracapsular w/ intraocular lens implant (Bilateral); Tracheal surgery; Appendectomy; Colonoscopy (N/A, 8/5/2016); Colectomy; Throat surgery; and Eye surgery  Her family history includes Diabetes in her sister; Hypertension in her mother and sister; Stroke in her mother  She  reports that she has never smoked  She has never used smokeless tobacco  She reports that she does not drink alcohol and does not use drugs    Current Outpatient Medications   Medication Sig Dispense Refill    alendronate (FOSAMAX) 70 mg tablet TAKE ONE TABLET BY MOUTH ONCE EVERY WEEK 12 tablet 3    ezetimibe-simvastatin (VYTORIN) 10-20 mg per tablet TAKE ONE TABLET BY MOUTH AT BEDTIME 90 tablet 3    Flaxseed, Linseed, (Flax Seed Oil) 1000 MG CAPS Take by mouth      hydrochlorothiazide (HYDRODIURIL) 25 mg tablet TAKE ONE TABLET BY MOUTH EVERY DAY (GENERIC FOR HYDRODIURIL) 90 tablet 3    hydrocortisone-pramoxine (ANALPRAM-HC) 2 5-1 % rectal cream Apply topically 3 (three) times a day 30 g 0    metoprolol tartrate (LOPRESSOR) 25 mg tablet TAKE ONE TABLET BY MOUTH TWICE A DAY (LOPRESSOR GENERIC) 180 tablet 3     No current facility-administered medications for this visit  She is allergic to azithromycin, niacin and related, and keflex [cephalexin]         Review of Systems:  Review of Systems   Eyes: Positive for visual disturbance  Respiratory: Negative for shortness of breath  Cardiovascular: Negative for chest pain, palpitations and leg swelling  Musculoskeletal: Positive for arthralgias  All other systems reviewed and are negative  Physical Exam:  /62 (BP Location: Left arm, Patient Position: Sitting, Cuff Size: Standard)   Pulse 70   Temp 98 2 °F (36 8 °C)   Ht 5' 2" (1 575 m)   Wt 57 2 kg (126 lb)   SpO2 97%   BMI 23 05 kg/m²     Physical Exam  Constitutional:       General: She is not in acute distress  Appearance: She is well-developed  She is not diaphoretic  HENT:      Head: Normocephalic and atraumatic  Eyes:      Conjunctiva/sclera: Conjunctivae normal       Pupils: Pupils are equal, round, and reactive to light  Neck:      Thyroid: No thyromegaly  Vascular: No JVD  Cardiovascular:      Rate and Rhythm: Normal rate and regular rhythm  Heart sounds: Normal heart sounds  No murmur heard  No friction rub  No gallop  Pulmonary:      Effort: Pulmonary effort is normal       Breath sounds: Normal breath sounds  Musculoskeletal:      Cervical back: Neck supple  Skin:     General: Skin is warm and dry  Findings: No erythema or rash  Neurological:      Mental Status: She is alert and oriented to person, place, and time  Cranial Nerves: No cranial nerve deficit  Psychiatric:         Behavior: Behavior normal          Thought Content: Thought content normal          Judgment: Judgment normal          Cardiographics  ECG:  Normal sinus rhythm  rate of 70 beats per min    Labs:  Lab Results   Component Value Date     12/27/2013    K 4 3 03/17/2022    K 3 9 12/27/2013     03/17/2022     12/27/2013    CO2 32 03/17/2022    CO2 33 12/27/2013    BUN 14 03/17/2022    BUN 15 12/27/2013    CREATININE 0 64 03/17/2022    CREATININE 0 7 12/27/2013    CALCIUM 9 5 03/17/2022    CALCIUM 8 9 12/27/2013     Lab Results   Component Value Date    WBC 6 69 04/13/2021    HGB 13 0 04/13/2021    HCT 40 1 04/13/2021    MCV 92 04/13/2021     04/13/2021     Lab Results   Component Value Date    CHOL 128 12/27/2013    TRIG 160 (H) 03/17/2022    TRIG 155 12/27/2013    HDL 39 (L) 03/17/2022    HDL 39 12/27/2013      Discussion/Summary:  1  Paroxysmal atrial fibrillation (HCC)    2  Benign essential hypertension    3  Chronic diastolic congestive heart failure ([address] )    4  Other hyperlipidemia      - she remains in sinus rhythm  No recent episodes of atrial fibrillation  Discussed anticoagulation with her in detail including risk and benefit  She is currently off Xarelto and does not wish to restart  - cough stopped with discontinuation of lisinopril   -  continue metoprolol 25 mg b i d  And hydrochlorothiazide 25 mg daily  Blood pressure is at goal   - continue Vytorin    Last LDL cholesterol was at goal

## 2022-06-11 DIAGNOSIS — M81.0 OSTEOPOROSIS, UNSPECIFIED OSTEOPOROSIS TYPE, UNSPECIFIED PATHOLOGICAL FRACTURE PRESENCE: ICD-10-CM

## 2022-06-13 RX ORDER — ALENDRONATE SODIUM 70 MG/1
TABLET ORAL
Qty: 12 TABLET | Refills: 3 | Status: SHIPPED | OUTPATIENT
Start: 2022-06-13

## 2022-06-20 DIAGNOSIS — I10 BENIGN ESSENTIAL HYPERTENSION: ICD-10-CM

## 2022-06-20 RX ORDER — HYDROCHLOROTHIAZIDE 25 MG/1
TABLET ORAL
Qty: 90 TABLET | Refills: 3 | Status: SHIPPED | OUTPATIENT
Start: 2022-06-20

## 2022-06-22 ENCOUNTER — HOSPITAL ENCOUNTER (OUTPATIENT)
Dept: RADIOLOGY | Facility: HOSPITAL | Age: 87
Discharge: HOME | End: 2022-06-22
Attending: FAMILY MEDICINE
Payer: MEDICARE

## 2022-06-22 DIAGNOSIS — M81.0 OSTEOPOROSIS, UNSPECIFIED OSTEOPOROSIS TYPE, UNSPECIFIED PATHOLOGICAL FRACTURE PRESENCE: ICD-10-CM

## 2022-06-22 PROCEDURE — 77080 DXA BONE DENSITY AXIAL: CPT

## 2022-06-30 DIAGNOSIS — E78.5 HYPERLIPIDEMIA, UNSPECIFIED HYPERLIPIDEMIA TYPE: ICD-10-CM

## 2022-06-30 RX ORDER — EZETIMIBE AND SIMVASTATIN 10; 20 MG/1; MG/1
TABLET ORAL
Qty: 90 TABLET | Refills: 3 | Status: SHIPPED | OUTPATIENT
Start: 2022-06-30 | End: 2022-07-02

## 2022-07-01 DIAGNOSIS — E78.5 HYPERLIPIDEMIA, UNSPECIFIED HYPERLIPIDEMIA TYPE: ICD-10-CM

## 2022-07-02 RX ORDER — EZETIMIBE AND SIMVASTATIN 10; 20 MG/1; MG/1
TABLET ORAL
Qty: 90 TABLET | Refills: 3 | Status: SHIPPED | OUTPATIENT
Start: 2022-07-02

## 2022-07-19 ENCOUNTER — OFFICE VISIT (OUTPATIENT)
Dept: FAMILY MEDICINE CLINIC | Facility: CLINIC | Age: 87
End: 2022-07-19
Payer: MEDICARE

## 2022-07-19 VITALS
HEART RATE: 76 BPM | RESPIRATION RATE: 16 BRPM | SYSTOLIC BLOOD PRESSURE: 114 MMHG | WEIGHT: 125 LBS | TEMPERATURE: 96 F | OXYGEN SATURATION: 96 % | HEIGHT: 62 IN | DIASTOLIC BLOOD PRESSURE: 70 MMHG | BODY MASS INDEX: 23 KG/M2

## 2022-07-19 DIAGNOSIS — L03.211 CELLULITIS OF FACE: Primary | ICD-10-CM

## 2022-07-19 PROCEDURE — 99213 OFFICE O/P EST LOW 20 MIN: CPT | Performed by: FAMILY MEDICINE

## 2022-07-19 RX ORDER — SULFAMETHOXAZOLE AND TRIMETHOPRIM 800; 160 MG/1; MG/1
1 TABLET ORAL EVERY 12 HOURS SCHEDULED
Qty: 10 TABLET | Refills: 0 | Status: SHIPPED | OUTPATIENT
Start: 2022-07-19 | End: 2022-07-24

## 2022-07-19 NOTE — PROGRESS NOTES
Chief Complaint   Patient presents with    Rash     Right side of face        Patient ID: Sajan Allen is a 80 y o  female  HPI  Pt is seeing for painless redness on R side of the face with swelling and itchiness -  Started 2 days ago -  Tried Neosporin OTC -  Not better  -  Does not recall insect bite or an injury  - not better     The following portions of the patient's history were reviewed and updated as appropriate: allergies, current medications, past family history, past medical history, past social history, past surgical history and problem list     Review of Systems   Constitutional: Negative  Respiratory: Negative  Cardiovascular: Negative  Gastrointestinal: Negative  Genitourinary: Negative  Musculoskeletal: Negative  Neurological: Negative  Current Outpatient Medications   Medication Sig Dispense Refill    alendronate (FOSAMAX) 70 mg tablet TAKE ONE TABLET BY MOUTH ONCE EVERY WEEK 12 tablet 3    ezetimibe-simvastatin (VYTORIN) 10-20 mg per tablet TAKE ONE TABLET BY MOUTH AT BEDTIME 90 tablet 3    Flaxseed, Linseed, (Flax Seed Oil) 1000 MG CAPS Take by mouth      hydrochlorothiazide (HYDRODIURIL) 25 mg tablet TAKE ONE TABLET BY MOUTH EVERY DAY (GENERIC FOR HYDRODIURIL) 90 tablet 3    hydrocortisone-pramoxine (ANALPRAM-HC) 2 5-1 % rectal cream Apply topically 3 (three) times a day 30 g 0    metoprolol tartrate (LOPRESSOR) 25 mg tablet TAKE ONE TABLET BY MOUTH TWICE A DAY (LOPRESSOR GENERIC) 180 tablet 3     No current facility-administered medications for this visit  Objective:    /70 (BP Location: Left arm, Patient Position: Sitting, Cuff Size: Standard)   Pulse 76   Temp (!) 96 °F (35 6 °C)   Resp 16   Ht 5' 2" (1 575 m)   Wt 56 7 kg (125 lb)   SpO2 96%   BMI 22 86 kg/m²        Physical Exam  Constitutional:       Appearance: She is not ill-appearing  Skin:     Findings: Rash present  Rash is macular and pustular        Comments: 3 cm in d R upper cheek    Neurological:      Mental Status: She is alert  Assessment/Plan:         Diagnoses and all orders for this visit:    Cellulitis of face  -     sulfamethoxazole-trimethoprim (BACTRIM DS) 800-160 mg per tablet; Take 1 tablet by mouth every 12 (twelve) hours for 5 days  -     mupirocin (BACTROBAN) 2 % ointment;  Apply topically 3 (three) times a day          rto in 2 days                 Elaine Li MD

## 2022-07-21 ENCOUNTER — OFFICE VISIT (OUTPATIENT)
Dept: FAMILY MEDICINE CLINIC | Facility: CLINIC | Age: 87
End: 2022-07-21
Payer: MEDICARE

## 2022-07-21 VITALS
TEMPERATURE: 97.1 F | DIASTOLIC BLOOD PRESSURE: 60 MMHG | HEART RATE: 76 BPM | RESPIRATION RATE: 14 BRPM | BODY MASS INDEX: 22.82 KG/M2 | WEIGHT: 124 LBS | SYSTOLIC BLOOD PRESSURE: 100 MMHG | HEIGHT: 62 IN | OXYGEN SATURATION: 95 %

## 2022-07-21 DIAGNOSIS — L03.211 CELLULITIS OF FACE: Primary | ICD-10-CM

## 2022-07-21 PROCEDURE — 99213 OFFICE O/P EST LOW 20 MIN: CPT | Performed by: FAMILY MEDICINE

## 2022-07-21 NOTE — PROGRESS NOTES
Chief Complaint   Patient presents with    Follow-up     Rash on on left side of face         Patient ID: Lukasz Lee is a 80 y o  female  HPI  Pt is seeing for f/u swelling on R side of the face  -  No pain, started on Bactrim and Bactroban -  Redness is improving     The following portions of the patient's history were reviewed and updated as appropriate: allergies, current medications, past family history, past medical history, past social history, past surgical history and problem list     Review of Systems   Constitutional: Negative  Respiratory: Negative  Cardiovascular: Negative  Gastrointestinal: Negative  Genitourinary: Negative  Musculoskeletal: Negative  Neurological: Negative  Current Outpatient Medications   Medication Sig Dispense Refill    alendronate (FOSAMAX) 70 mg tablet TAKE ONE TABLET BY MOUTH ONCE EVERY WEEK 12 tablet 3    ezetimibe-simvastatin (VYTORIN) 10-20 mg per tablet TAKE ONE TABLET BY MOUTH AT BEDTIME 90 tablet 3    Flaxseed, Linseed, (Flax Seed Oil) 1000 MG CAPS Take by mouth      hydrochlorothiazide (HYDRODIURIL) 25 mg tablet TAKE ONE TABLET BY MOUTH EVERY DAY (GENERIC FOR HYDRODIURIL) 90 tablet 3    hydrocortisone-pramoxine (ANALPRAM-HC) 2 5-1 % rectal cream Apply topically 3 (three) times a day 30 g 0    metoprolol tartrate (LOPRESSOR) 25 mg tablet TAKE ONE TABLET BY MOUTH TWICE A DAY (LOPRESSOR GENERIC) 180 tablet 3    mupirocin (BACTROBAN) 2 % ointment Apply topically 3 (three) times a day 22 g 0    sulfamethoxazole-trimethoprim (BACTRIM DS) 800-160 mg per tablet Take 1 tablet by mouth every 12 (twelve) hours for 5 days 10 tablet 0     No current facility-administered medications for this visit         Objective:    /60 (BP Location: Left arm, Patient Position: Sitting, Cuff Size: Standard)   Pulse 76   Temp (!) 97 1 °F (36 2 °C)   Resp 14   Ht 5' 2" (1 575 m)   Wt 56 2 kg (124 lb)   SpO2 95%   BMI 22 68 kg/m²        Physical Exam  HENT:      Head:         no induration           Assessment/Plan:         Diagnoses and all orders for this visit:    Cellulitis of face      improving  Cont with Bactrim and Bactroban       Phone f/u in 4 days        rto prn             Miah Huntley MD

## 2022-07-25 ENCOUNTER — TELEPHONE (OUTPATIENT)
Dept: FAMILY MEDICINE CLINIC | Facility: CLINIC | Age: 87
End: 2022-07-25

## 2022-07-25 NOTE — TELEPHONE ENCOUNTER
[employer]:    CHAIM: Patient called to let you know that her rash is doing much better    She finished taking ABX and is still using the cream

## 2022-09-27 ENCOUNTER — OFFICE VISIT (OUTPATIENT)
Dept: FAMILY MEDICINE CLINIC | Facility: CLINIC | Age: 87
End: 2022-09-27
Payer: MEDICARE

## 2022-09-27 VITALS
WEIGHT: 130.2 LBS | HEART RATE: 90 BPM | SYSTOLIC BLOOD PRESSURE: 128 MMHG | RESPIRATION RATE: 18 BRPM | TEMPERATURE: 98.1 F | HEIGHT: 62 IN | BODY MASS INDEX: 23.96 KG/M2 | DIASTOLIC BLOOD PRESSURE: 74 MMHG

## 2022-09-27 DIAGNOSIS — Z23 NEED FOR INFLUENZA VACCINATION: ICD-10-CM

## 2022-09-27 DIAGNOSIS — I10 BENIGN ESSENTIAL HYPERTENSION: Primary | ICD-10-CM

## 2022-09-27 PROCEDURE — G0008 ADMIN INFLUENZA VIRUS VAC: HCPCS | Performed by: FAMILY MEDICINE

## 2022-09-27 PROCEDURE — 99213 OFFICE O/P EST LOW 20 MIN: CPT | Performed by: FAMILY MEDICINE

## 2022-09-27 PROCEDURE — 90662 IIV NO PRSV INCREASED AG IM: CPT | Performed by: FAMILY MEDICINE

## 2022-09-27 NOTE — PROGRESS NOTES
Chief Complaint   Patient presents with    Follow-up     F/u HTN         Patient ID: Ricky Soto is a 80 y o  female  HPI  Pt is seeing for f/u HTN -  Stable     The following portions of the patient's history were reviewed and updated as appropriate: allergies, current medications, past family history, past medical history, past social history, past surgical history and problem list     Review of Systems   Constitutional: Negative  Respiratory: Negative  Cardiovascular: Negative  Gastrointestinal: Negative  Genitourinary: Negative  Musculoskeletal: Negative  Skin: Negative  Neurological: Negative  Current Outpatient Medications   Medication Sig Dispense Refill    alendronate (FOSAMAX) 70 mg tablet TAKE ONE TABLET BY MOUTH ONCE EVERY WEEK 12 tablet 3    ezetimibe-simvastatin (VYTORIN) 10-20 mg per tablet TAKE ONE TABLET BY MOUTH AT BEDTIME 90 tablet 3    Flaxseed, Linseed, (Flax Seed Oil) 1000 MG CAPS Take by mouth      hydrochlorothiazide (HYDRODIURIL) 25 mg tablet TAKE ONE TABLET BY MOUTH EVERY DAY (GENERIC FOR HYDRODIURIL) 90 tablet 3    metoprolol tartrate (LOPRESSOR) 25 mg tablet TAKE ONE TABLET BY MOUTH TWICE A DAY (LOPRESSOR GENERIC) 180 tablet 3     No current facility-administered medications for this visit  Objective:    /74 (BP Location: Left arm, Patient Position: Sitting, Cuff Size: Standard)   Pulse 90   Temp 98 1 °F (36 7 °C)   Resp 18   Ht 5' 2" (1 575 m)   Wt 59 1 kg (130 lb 3 2 oz)   BMI 23 81 kg/m²        Physical Exam  Constitutional:       Appearance: She is not ill-appearing  Cardiovascular:      Rate and Rhythm: Normal rate and regular rhythm  Heart sounds: Murmur heard  Pulmonary:      Effort: Pulmonary effort is normal  No respiratory distress  Breath sounds: No wheezing, rhonchi or rales  Musculoskeletal:      Right lower leg: No edema  Left lower leg: No edema     Neurological:      General: No focal deficit present  Mental Status: She is alert and oriented to person, place, and time  Psychiatric:         Mood and Affect: Mood normal            Labs in chart were reviewed        Assessment/Plan:         Diagnoses and all orders for this visit:    Benign essential hypertension    Stable, cont meds  Need for influenza vaccination  -     influenza vaccine, high-dose, PF 0 7 mL (FLUZONE HIGH-DOSE)        rto in 6 m                     Migdalia Recio MD

## 2022-10-21 DIAGNOSIS — I48.91 ATRIAL FIBRILLATION WITH RAPID VENTRICULAR RESPONSE (HCC): ICD-10-CM

## 2023-03-27 ENCOUNTER — OFFICE VISIT (OUTPATIENT)
Dept: FAMILY MEDICINE CLINIC | Facility: CLINIC | Age: 88
End: 2023-03-27

## 2023-03-27 VITALS
DIASTOLIC BLOOD PRESSURE: 70 MMHG | TEMPERATURE: 97.8 F | BODY MASS INDEX: 23.41 KG/M2 | HEIGHT: 61 IN | HEART RATE: 88 BPM | SYSTOLIC BLOOD PRESSURE: 110 MMHG | OXYGEN SATURATION: 98 % | WEIGHT: 124 LBS | RESPIRATION RATE: 14 BRPM

## 2023-03-27 DIAGNOSIS — K62.5 BRBPR (BRIGHT RED BLOOD PER RECTUM): Primary | ICD-10-CM

## 2023-03-27 DIAGNOSIS — Z00.00 MEDICARE ANNUAL WELLNESS VISIT, SUBSEQUENT: ICD-10-CM

## 2023-03-27 DIAGNOSIS — I10 BENIGN ESSENTIAL HYPERTENSION: ICD-10-CM

## 2023-03-27 NOTE — PROGRESS NOTES
Assessment and Plan:     Problem List Items Addressed This Visit        Cardiovascular and Mediastinum    Benign essential hypertension    Relevant Orders    Comprehensive metabolic panel    Lipid panel   Other Visit Diagnoses     BRBPR (bright red blood per rectum)    -  Primary    Relevant Orders    CBC    Ambulatory Referral to Gastroenterology    Medicare annual wellness visit, subsequent               Preventive health issues were discussed with patient, and age appropriate screening tests were ordered as noted in patient's After Visit Summary  Personalized health advice and appropriate referrals for health education or preventive services given if needed, as noted in patient's After Visit Summary  History of Present Illness:     Patient presents for a Medicare Wellness Visit    Pt reported recurrent episodes of rectal bleeding  -  H/o hemorrhoids, diverticula, colon cancer  -  Last colonoscopy 2 y ago       Patient Care Team:  Umang Mckeon MD as PCP - General  MD Parul Ibanez MD as Endoscopist  Hien Villarreal DO (Cardiology)     Review of Systems:     Review of Systems   Constitutional: Negative  Negative for activity change, appetite change and fatigue  Respiratory: Positive for shortness of breath (chronic   with moderate exertion only )  Negative for cough, choking, chest tightness and wheezing  Cardiovascular: Negative  Gastrointestinal: Positive for blood in stool  Negative for abdominal distention and abdominal pain  Genitourinary: Negative  Musculoskeletal: Negative  Skin: Negative  Neurological: Negative           Problem List:     Patient Active Problem List   Diagnosis   • Benign essential hypertension   • Other hyperlipidemia   • Macular degeneration   • Post-menopausal osteoporosis   • Pleural effusion on right   • Mediastinal adenopathy   • Renal cyst   • Thrombocytosis   • Paroxysmal atrial fibrillation (HCC)   • Centrilobular emphysema (HCC)   • Lung nodule   • Chronic diastolic congestive heart failure (HCC)   • Dyspnea on exertion   • On continuous oral anticoagulation   • History of colon cancer   • Osteoporosis      Past Medical and Surgical History:     Past Medical History:   Diagnosis Date   • A-fib [facility])    • 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[facility] [address] )    • Community acquired pneumonia     last assessed: 10/11/16   • Hyperlipidemia    • Hypertension    • Irregular heart beat    • Kidney stone    • Lung abnormality    • Macular degeneration      Past Surgical History:   Procedure Laterality Date   • APPENDECTOMY     • BOWEL RESECTION     • CATARACT EXTRACTION EXTRACAPSULAR W/ INTRAOCULAR LENS IMPLANTATION Bilateral    • COLECTOMY      Partial   • COLON SURGERY      colon cancer   • COLONOSCOPY N/A 8/5/2016    Procedure: COLONOSCOPY;  Surgeon: Steven Manzanares MD;  Location: [facility] GI LAB; Service:    • EYE SURGERY     • HYSTERECTOMY     • THROAT SURGERY     • TRACHEAL SURGERY      tumor removed benign   • TUMOR REMOVAL N/A 2000    trachea      Family History:     Family History   Problem Relation Age of Onset   • Diabetes Sister    • Hypertension Sister    • Stroke Mother    • Hypertension Mother       Social History:     Social History     Socioeconomic History   • Marital status:       Spouse name: None   • Number of children: None   • Years of education: None   • Highest education level: None   Occupational History   • None   Tobacco Use   • Smoking status: Never   • Smokeless tobacco: Never   Vaping Use   • Vaping Use: Never used   Substance and Sexual Activity   • Alcohol use: No   • Drug use: No   • Sexual activity: None     Comment: NOT ASKED   Other Topics Concern   • None   Social History Narrative    Daily tea consumption ( 1 cups/day)     Social Determinants of Health     Financial Resource Strain: Low Risk    • Difficulty of Paying Living Expenses: Not hard at all   Food Insecurity: Not on file   Transportation Needs: No Transportation Needs   • Lack of Transportation (Medical): No   • Lack of Transportation (Non-Medical): No   Physical Activity: Not on file   Stress: Not on file   Social Connections: Not on file   Intimate Partner Violence: Not on file   Housing Stability: Not on file      Medications and Allergies:     Current Outpatient Medications   Medication Sig Dispense Refill   • alendronate (FOSAMAX) 70 mg tablet TAKE ONE TABLET BY MOUTH ONCE EVERY WEEK 12 tablet 3   • ezetimibe-simvastatin (VYTORIN) 10-20 mg per tablet TAKE ONE TABLET BY MOUTH AT BEDTIME 90 tablet 3   • hydrochlorothiazide (HYDRODIURIL) 25 mg tablet TAKE ONE TABLET BY MOUTH EVERY DAY (GENERIC FOR HYDRODIURIL) 90 tablet 3   • metoprolol tartrate (LOPRESSOR) 25 mg tablet TAKE ONE TABLET BY MOUTH TWICE A DAY (GENERIC FOR LOPRESSOR) 180 tablet 3   • Flaxseed, Linseed, (Flax Seed Oil) 1000 MG CAPS Take by mouth (Patient not taking: Reported on 3/27/2023)       No current facility-administered medications for this visit  Allergies   Allergen Reactions   • Azithromycin    • Niacin And Related    • Keflex [Cephalexin] Rash      Immunizations:     Immunization History   Administered Date(s) Administered   • COVID-19 MODERNA VACC 0 25 ML IM BOOSTER 11/12/2021   • COVID-19 MODERNA VACC 0 5 ML IM 02/12/2021, 03/12/2021   • Influenza Split High Dose Preservative Free IM 10/09/2012, 11/03/2014, 10/11/2016, 10/13/2017   • Influenza, high dose seasonal 0 7 mL 10/15/2018, 09/16/2019, 12/03/2020, 09/27/2022   • Influenza, seasonal, injectable 10/18/2006, 10/10/2007, 10/31/2008, 10/12/2010, 10/10/2011, 12/27/2013   • Pneumococcal Conjugate 13-Valent 06/29/2015   • Pneumococcal Polysaccharide PPV23 06/24/2013   • Tdap 04/05/2007      Health Maintenance: There are no preventive care reminders to display for this patient        Topic Date Due   • COVID-19 Vaccine (4 - Booster for Moderna series) 01/07/2022      Medicare Screening Tests and Risk Assessments:     Surendra Herrera is here for her Subsequent Wellness visit  Health Risk Assessment:   Patient rates overall health as good  Patient feels that their physical health rating is same  Patient is very satisfied with their life  Eyesight was rated as much worse  Hearing was rated as same  Patient feels that their emotional and mental health rating is same  Patients states they are never, rarely angry  Patient states they are never, rarely unusually tired/fatigued  Pain experienced in the last 7 days has been some  Patient's pain rating has been 1/10  Patient states that she has experienced no weight loss or gain in last 6 months  Depression Screening:   PHQ-2 Score: 0      Fall Risk Screening: In the past year, patient has experienced: no history of falling in past year      Urinary Incontinence Screening:   Patient has not leaked urine accidently in the last six months  Home Safety:  Patient does not have trouble with stairs inside or outside of their home  Patient has working smoke alarms and has working carbon monoxide detector  Home safety hazards include: none  Nutrition:   Current diet is Low Cholesterol, No Added Salt and Regular  Medications:   Patient is not currently taking any over-the-counter supplements  Patient is able to manage medications  Activities of Daily Living (ADLs)/Instrumental Activities of Daily Living (IADLs):   Walk and transfer into and out of bed and chair?: Yes  Dress and groom yourself?: Yes    Bathe or shower yourself?: Yes    Feed yourself? Yes  Do your laundry/housekeeping?: Yes  Manage your money, pay your bills and track your expenses?: Yes  Make your own meals?: Yes    Do your own shopping?: Yes    Previous Hospitalizations:   Any hospitalizations or ED visits within the last 12 months?: No      Advance Care Planning:   Living will: Yes    Durable POA for healthcare:  Yes    Advanced directive: Yes      Cognitive Screening:   Provider or family/friend/caregiver concerned "regarding cognition?: No    PREVENTIVE SCREENINGS      Cardiovascular Screening:    General: Screening Not Indicated and History Lipid Disorder    Due for: Lipid Panel      Diabetes Screening:       Due for: Blood Glucose      Breast Cancer Screening:     General: Screening Not Indicated      Cervical Cancer Screening:    General: Screening Not Indicated      Osteoporosis Screening:    General: Screening Not Indicated and History Osteoporosis      Abdominal Aortic Aneurysm (AAA) Screening:        General: Screening Not Indicated      Lung Cancer Screening:     General: Screening Not Indicated      Hepatitis C Screening:    General: Screening Not Indicated    Screening, Brief Intervention, and Referral to Treatment (SBIRT)    Screening  Typical number of drinks in a day: 0  Typical number of drinks in a week: 0  Interpretation: Low risk drinking behavior  Single Item Drug Screening:  How often have you used an illegal drug (including marijuana) or a prescription medication for non-medical reasons in the past year? never    Single Item Drug Screen Score: 0  Interpretation: Negative screen for possible drug use disorder    No results found  Physical Exam:     /70 (BP Location: Left arm, Patient Position: Sitting, Cuff Size: Standard)   Pulse 88   Temp 97 8 °F (36 6 °C) (Temporal)   Resp 14   Ht 5' 1\" (1 549 m)   Wt 56 2 kg (124 lb)   SpO2 98%   BMI 23 43 kg/m²     Physical Exam  Constitutional:       General: She is not in acute distress  Appearance: She is not ill-appearing  Cardiovascular:      Rate and Rhythm: Normal rate and regular rhythm  Pulmonary:      Effort: Pulmonary effort is normal  No respiratory distress  Breath sounds: No wheezing, rhonchi or rales  Abdominal:      Palpations: Abdomen is soft  There is no mass  Tenderness: There is no abdominal tenderness  Skin:     Coloration: Skin is not pale  Neurological:      General: No focal deficit present        Mental " Status: She is alert and oriented to person, place, and time     Psychiatric:         Mood and Affect: Mood normal           Chelsea Mejias MD

## 2023-03-27 NOTE — PATIENT INSTRUCTIONS
Medicare Preventive Visit Patient Instructions  Thank you for completing your Welcome to Medicare Visit or Medicare Annual Wellness Visit today  Your next wellness visit will be due in one year (3/27/2024)  The screening/preventive services that you may require over the next 5-10 years are detailed below  Some tests may not apply to you based off risk factors and/or age  Screening tests ordered at today's visit but not completed yet may show as past due  Also, please note that scanned in results may not display below  Preventive Screenings:  Service Recommendations Previous Testing/Comments   Colorectal Cancer Screening  * Colonoscopy    * Fecal Occult Blood Test (FOBT)/Fecal Immunochemical Test (FIT)  * Fecal DNA/Cologuard Test  * Flexible Sigmoidoscopy Age: 39-70 years old   Colonoscopy: every 10 years (may be performed more frequently if at higher risk)  OR  FOBT/FIT: every 1 year  OR  Cologuard: every 3 years  OR  Sigmoidoscopy: every 5 years  Screening may be recommended earlier than age 39 if at higher risk for colorectal cancer  Also, an individualized decision between you and your healthcare provider will decide whether screening between the ages of 74-80 would be appropriate  Colonoscopy: 07/01/2021  FOBT/FIT: Not on file  Cologuard: Not on file  Sigmoidoscopy: Not on file          Breast Cancer Screening Age: 36 years old  Frequency: every 1-2 years  Not required if history of left and right mastectomy Mammogram: Not on file        Cervical Cancer Screening Between the ages of 21-29, pap smear recommended once every 3 years  Between the ages of 33-67, can perform pap smear with HPV co-testing every 5 years     Recommendations may differ for women with a history of total hysterectomy, cervical cancer, or abnormal pap smears in past  Pap Smear: Not on file        Hepatitis C Screening Once for adults born [facility]  More frequently in patients at high risk for Hepatitis C Hep C Antibody: Not on file        Diabetes Screening 1-2 times per year if you're at risk for diabetes or have pre-diabetes Fasting glucose: 109 mg/dL (3/17/2022)  A1C: No results in last 5 years (No results in last 5 years)      Cholesterol Screening Once every 5 years if you don't have a lipid disorder  May order more often based on risk factors  Lipid panel: 03/17/2022          Other Preventive Screenings Covered by Medicare:  1  Abdominal Aortic Aneurysm (AAA) Screening: covered once if your at risk  You're considered to be at risk if you have a family history of AAA  2  Lung Cancer Screening: covers low dose CT scan once per year if you meet all of the following conditions: (1) Age 50-69; (2) No signs or symptoms of lung cancer; (3) Current smoker or have quit smoking within the last 15 years; (4) You have a tobacco smoking history of at least 20 pack years (packs per day multiplied by number of years you smoked); (5) You get a written order from a healthcare provider  3  Glaucoma Screening: covered annually if you're considered high risk: (1) You have diabetes OR (2) Family history of glaucoma OR (3)  aged 48 and older OR (3)  American aged 72 and older  3  Osteoporosis Screening: covered every 2 years if you meet one of the following conditions: (1) You're estrogen deficient and at risk for osteoporosis based off medical history and other findings; (2) Have a vertebral abnormality; (3) On glucocorticoid therapy for more than 3 months; (4) Have primary hyperparathyroidism; (5) On osteoporosis medications and need to assess response to drug therapy  · Last bone density test (DXA Scan): 06/22/2022   5  HIV Screening: covered annually if you're between the age of 15-65  Also covered annually if you are younger than 13 and older than 72 with risk factors for HIV infection  For pregnant patients, it is covered up to 3 times per pregnancy      Immunizations:  Immunization Recommendations   Influenza Vaccine Annual influenza vaccination during flu season is recommended for all persons aged >= 6 months who do not have contraindications   Pneumococcal Vaccine   * Pneumococcal conjugate vaccine = PCV13 (Prevnar 13), PCV15 (Vaxneuvance), PCV20 (Prevnar 20)  * Pneumococcal polysaccharide vaccine = PPSV23 (Pneumovax) Adults 25-60 years old: 1-3 doses may be recommended based on certain risk factors  Adults 72 years old: 1-2 doses may be recommended based off what pneumonia vaccine you previously received   Hepatitis B Vaccine 3 dose series if at intermediate or high risk (ex: diabetes, end stage renal disease, liver disease)   Tetanus (Td) Vaccine - COST NOT COVERED BY MEDICARE PART B Following completion of primary series, a booster dose should be given every 10 years to maintain immunity against tetanus  Td may also be given as tetanus wound prophylaxis  Tdap Vaccine - COST NOT COVERED BY MEDICARE PART B Recommended at least once for all adults  For pregnant patients, recommended with each pregnancy  Shingles Vaccine (Shingrix) - COST NOT COVERED BY MEDICARE PART B  2 shot series recommended in those aged 48 and above     Health Maintenance Due:  There are no preventive care reminders to display for this patient  Immunizations Due:      Topic Date Due   • COVID-19 Vaccine (4 - Booster for Moderna series) 01/07/2022     Advance Directives   What are advance directives? Advance directives are legal documents that state your wishes and plans for medical care  These plans are made ahead of time in case you lose your ability to make decisions for yourself  Advance directives can apply to any medical decision, such as the treatments you want, and if you want to donate organs  What are the types of advance directives? There are many types of advance directives, and each state has rules about how to use them  You may choose a combination of any of the following:  · Living will:   This is a written record of the treatment you want  You can also choose which treatments you do not want, which to limit, and which to stop at a certain time  This includes surgery, medicine, IV fluid, and tube feedings  · Durable power of  for healthcare [facility]): This is a written record that states who you want to make healthcare choices for you when you are unable to make them for yourself  This person, called a proxy, is usually a family member or a friend  You may choose more than 1 proxy  · Do not resuscitate (DNR) order:  A DNR order is used in case your heart stops beating or you stop breathing  It is a request not to have certain forms of treatment, such as CPR  A DNR order may be included in other types of advance directives  · Medical directive: This covers the care that you want if you are in a coma, near death, or unable to make decisions for yourself  You can list the treatments you want for each condition  Treatment may include pain medicine, surgery, blood transfusions, dialysis, IV or tube feedings, and a ventilator (breathing machine)  · Values history: This document has questions about your views, beliefs, and how you feel and think about life  This information can help others choose the care that you would choose  Why are advance directives important? An advance directive helps you control your care  Although spoken wishes may be used, it is better to have your wishes written down  Spoken wishes can be misunderstood, or not followed  Treatments may be given even if you do not want them  An advance directive may make it easier for your family to make difficult choices about your care  Urinary Incontinence   Urinary incontinence (UI)  is when you lose control of your bladder  UI develops because your bladder cannot store or empty urine properly  The 3 most common types of UI are stress incontinence, urge incontinence, or both  Medicines:   · May be given to help strengthen your bladder control   Report any side effects of medication to your healthcare provider  Do pelvic muscle exercises often:  Your pelvic muscles help you stop urinating  Squeeze these muscles tight for 5 seconds, then relax for 5 seconds  Gradually work up to squeezing for 10 seconds  Do 3 sets of 15 repetitions a day, or as directed  This will help strengthen your pelvic muscles and improve bladder control  Train your bladder:  Go to the bathroom at set times, such as every 2 hours, even if you do not feel the urge to go  You can also try to hold your urine when you feel the urge to go  For example, hold your urine for 5 minutes when you feel the urge to go  As that becomes easier, hold your urine for 10 minutes  Self-care:   · Keep a UI record  Write down how often you leak urine and how much you leak  Make a note of what you were doing when you leaked urine  · Drink liquids as directed  You may need to limit the amount of liquid you drink to help control your urine leakage  Do not drink any liquid right before you go to bed  Limit or do not have drinks that contain caffeine or alcohol  · Prevent constipation  Eat a variety of high-fiber foods  Good examples are high-fiber cereals, beans, vegetables, and whole-grain breads  Walking is the best way to trigger your intestines to have a bowel movement  · Exercise regularly and maintain a healthy weight  Weight loss and exercise will decrease pressure on your bladder and help you control your leakage  · Use a catheter as directed  to help empty your bladder  A catheter is a tiny, plastic tube that is put into your bladder to drain your urine  · Go to behavior therapy as directed  Behavior therapy may be used to help you learn to control your urge to urinate  © Copyright [employer] 2018 Information is for End User's use only and may not be sold, redistributed or otherwise used for commercial purposes   All illustrations and images included in CareNotes® are the copyrighted property of A D A M , Inc  or [address] [place] [address]

## 2023-04-19 DIAGNOSIS — E87.1 HYPONATREMIA: Primary | ICD-10-CM

## 2023-04-20 ENCOUNTER — APPOINTMENT (OUTPATIENT)
Dept: LAB | Facility: CLINIC | Age: 88
End: 2023-04-20

## 2023-04-20 DIAGNOSIS — E87.1 HYPONATREMIA: ICD-10-CM

## 2023-04-20 LAB
ANION GAP SERPL CALCULATED.3IONS-SCNC: 1 MMOL/L (ref 4–13)
BUN SERPL-MCNC: 12 MG/DL (ref 5–25)
CALCIUM SERPL-MCNC: 8.6 MG/DL (ref 8.3–10.1)
CHLORIDE SERPL-SCNC: 101 MMOL/L (ref 96–108)
CO2 SERPL-SCNC: 32 MMOL/L (ref 21–32)
CREAT SERPL-MCNC: 0.58 MG/DL (ref 0.6–1.3)
GFR SERPL CREATININE-BSD FRML MDRD: 81 ML/MIN/1.73SQ M
GLUCOSE SERPL-MCNC: 97 MG/DL (ref 65–140)
POTASSIUM SERPL-SCNC: 4.1 MMOL/L (ref 3.5–5.3)
SODIUM SERPL-SCNC: 134 MMOL/L (ref 135–147)

## 2023-05-08 ENCOUNTER — TELEPHONE (OUTPATIENT)
Dept: GASTROENTEROLOGY | Facility: CLINIC | Age: 88
End: 2023-05-08

## 2023-05-08 NOTE — TELEPHONE ENCOUNTER
Confirmed colonoscopy with patient  She has her prep/instructions, has a  and will be called Friday prior with her arrival time

## 2023-05-15 DIAGNOSIS — M81.0 OSTEOPOROSIS, UNSPECIFIED OSTEOPOROSIS TYPE, UNSPECIFIED PATHOLOGICAL FRACTURE PRESENCE: ICD-10-CM

## 2023-05-15 RX ORDER — ALENDRONATE SODIUM 70 MG/1
TABLET ORAL
Qty: 12 TABLET | Refills: 3 | Status: SHIPPED | OUTPATIENT
Start: 2023-05-15

## 2023-05-19 ENCOUNTER — TELEPHONE (OUTPATIENT)
Dept: GASTROENTEROLOGY | Facility: CLINIC | Age: 88
End: 2023-05-19

## 2023-05-19 NOTE — TELEPHONE ENCOUNTER
Patient called to clarify prep directiojns  Answered all patients questions, she expressed understanding  She will contact our office with any further questions

## 2023-05-22 ENCOUNTER — ANESTHESIA (OUTPATIENT)
Dept: GASTROENTEROLOGY | Facility: AMBULARY SURGERY CENTER | Age: 88
End: 2023-05-22

## 2023-05-22 ENCOUNTER — HOSPITAL ENCOUNTER (OUTPATIENT)
Dept: RADIOLOGY | Facility: HOSPITAL | Age: 88
Discharge: HOME | End: 2023-05-22
Attending: INTERNAL MEDICINE

## 2023-05-22 ENCOUNTER — HOSPITAL ENCOUNTER (OUTPATIENT)
Dept: GASTROENTEROLOGY | Facility: AMBULARY SURGERY CENTER | Age: 88
Setting detail: OUTPATIENT SURGERY
Discharge: HOME | End: 2023-05-22
Attending: INTERNAL MEDICINE

## 2023-05-22 ENCOUNTER — ANESTHESIA EVENT (OUTPATIENT)
Dept: GASTROENTEROLOGY | Facility: AMBULARY SURGERY CENTER | Age: 88
End: 2023-05-22

## 2023-05-22 VITALS
DIASTOLIC BLOOD PRESSURE: 54 MMHG | OXYGEN SATURATION: 94 % | RESPIRATION RATE: 18 BRPM | HEART RATE: 59 BPM | TEMPERATURE: 98.6 F | SYSTOLIC BLOOD PRESSURE: 111 MMHG

## 2023-05-22 DIAGNOSIS — Z85.038 HISTORY OF COLON CANCER: ICD-10-CM

## 2023-05-22 DIAGNOSIS — K62.5 BRBPR (BRIGHT RED BLOOD PER RECTUM): ICD-10-CM

## 2023-05-22 RX ORDER — SODIUM CHLORIDE, SODIUM LACTATE, POTASSIUM CHLORIDE, CALCIUM CHLORIDE 600; 310; 30; 20 MG/100ML; MG/100ML; MG/100ML; MG/100ML
125 INJECTION, SOLUTION INTRAVENOUS CONTINUOUS
Status: DISCONTINUED | OUTPATIENT
Start: 2023-05-22 | End: 2023-05-26 | Stop reason: HOSPADM

## 2023-05-22 RX ORDER — PROPOFOL 10 MG/ML
INJECTION, EMULSION INTRAVENOUS CONTINUOUS PRN
Status: DISCONTINUED | OUTPATIENT
Start: 2023-05-22 | End: 2023-05-22

## 2023-05-22 RX ORDER — PROPOFOL 10 MG/ML
INJECTION, EMULSION INTRAVENOUS AS NEEDED
Status: DISCONTINUED | OUTPATIENT
Start: 2023-05-22 | End: 2023-05-22

## 2023-05-22 RX ORDER — SODIUM CHLORIDE, SODIUM LACTATE, POTASSIUM CHLORIDE, CALCIUM CHLORIDE 600; 310; 30; 20 MG/100ML; MG/100ML; MG/100ML; MG/100ML
INJECTION, SOLUTION INTRAVENOUS CONTINUOUS PRN
Status: DISCONTINUED | OUTPATIENT
Start: 2023-05-22 | End: 2023-05-22

## 2023-05-22 RX ADMIN — SODIUM CHLORIDE, SODIUM LACTATE, POTASSIUM CHLORIDE, AND CALCIUM CHLORIDE: .6; .31; .03; .02 INJECTION, SOLUTION INTRAVENOUS at 12:44

## 2023-05-22 RX ADMIN — SODIUM CHLORIDE, SODIUM LACTATE, POTASSIUM CHLORIDE, AND CALCIUM CHLORIDE 125 ML/HR: .6; .31; .03; .02 INJECTION, SOLUTION INTRAVENOUS at 12:40

## 2023-05-22 RX ADMIN — PROPOFOL 50 MG: 10 INJECTION, EMULSION INTRAVENOUS at 12:49

## 2023-05-22 RX ADMIN — PROPOFOL 50 MCG/KG/MIN: 10 INJECTION, EMULSION INTRAVENOUS at 12:49

## 2023-05-22 NOTE — ANESTHESIA PREPROCEDURE EVALUATION
Procedure:  COLONOSCOPY    Relevant Problems   CARDIO   (+) Benign essential hypertension   (+) Chronic diastolic congestive heart failure (HCC)   (+) Dyspnea on exertion   (+) Other hyperlipidemia   (+) Paroxysmal atrial fibrillation (HCC)      /RENAL   (+) Renal cyst      NEURO/PSYCH   (+) History of colon cancer      PULMONARY   (+) Centrilobular emphysema (HCC)   (+) Dyspnea on exertion   (+) Pleural effusion on right      Other   (+) Mediastinal adenopathy             Anesthesia Plan  ASA Score- 3     Anesthesia Type- IV sedation with anesthesia with ASA Monitors  Additional Monitors:   Airway Plan:           Plan Factors-    Chart reviewed  Induction- intravenous  Postoperative Plan-     Informed Consent- Anesthetic plan and risks discussed with patient  I personally reviewed this patient with the CRNA  Discussed and agreed on the Anesthesia Plan with the CRNA  Luiz Mock

## 2023-05-22 NOTE — H&P
History and Physical -  [facility]  Chan James 80 y o  female MRN: [id number]                  HPI: Chan James is a 80y o  year old female who presents for colonoscopy due to history of colon cancer and bright red blood per rectum  Last colonoscopy suboptimal prep in 2021  Right hemicolectomy in 2001  REVIEW OF SYSTEMS: Per the HPI, and otherwise unremarkable  Historical Information   Past Medical History:   Diagnosis Date   • A-fib [facility])    • 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[facility] [address] )    • Community acquired pneumonia     last assessed: 10/11/16   • Hyperlipidemia    • Hypertension    • Irregular heart beat    • Kidney stone    • Lung abnormality    • Macular degeneration      Past Surgical History:   Procedure Laterality Date   • APPENDECTOMY     • BOWEL RESECTION     • CATARACT EXTRACTION EXTRACAPSULAR W/ INTRAOCULAR LENS IMPLANTATION Bilateral    • COLECTOMY      Partial   • COLON SURGERY      colon cancer   • COLONOSCOPY N/A 8/5/2016    Procedure: COLONOSCOPY;  Surgeon: Makayla Frazier MD;  Location: [facility] GI LAB;   Service:    • EYE SURGERY     • HYSTERECTOMY     • THROAT SURGERY     • TRACHEAL SURGERY      tumor removed benign   • TUMOR REMOVAL N/A 2000    trachea     Social History   Social History     Substance and Sexual Activity   Alcohol Use No     Social History     Substance and Sexual Activity   Drug Use No     Social History     Tobacco Use   Smoking Status Never   Smokeless Tobacco Never     Family History   Problem Relation Age of Onset   • Diabetes Sister    • Hypertension Sister    • Stroke Mother    • Hypertension Mother        Meds/Allergies       Current Outpatient Medications:   •  ezetimibe-simvastatin (VYTORIN) 10-20 mg per tablet  •  hydrochlorothiazide (HYDRODIURIL) 25 mg tablet  •  metoprolol tartrate (LOPRESSOR) 25 mg tablet  •  alendronate (FOSAMAX) 70 mg tablet  •  Flaxseed, Linseed, (Flax Seed Oil) 1000 MG CAPS  •  polyethylene glycol (GOLYTELY) 4000 mL solution    Allergies   Allergen Reactions   • Azithromycin    • Niacin And Related    • Keflex [Cephalexin] Rash       Objective     /58   Pulse 59   Temp 98 6 °F (37 °C) (Temporal)   Resp 18   SpO2 99%       PHYSICAL EXAM    Gen: NAD  Head: NCAT  CV: RRR  CHEST: Clear  ABD: soft, NT/ND  EXT: no edema      ASSESSMENT/PLAN:  This is a 80y o  year old female here for colonoscopy, and she is stable and optimized for her procedure

## 2023-05-22 NOTE — ANESTHESIA POSTPROCEDURE EVALUATION
Post-Op Assessment Note    CV Status:  Stable  Pain Score: 0    Pain management: adequate     Mental Status:  Alert and awake   Hydration Status:  Euvolemic   PONV Controlled:  Controlled   Airway Patency:  Patent      Post Op Vitals Reviewed: Yes      Staff: CRNA, Anesthesiologist         No notable events documented      BP   99/56   Temp   98   Pulse  64   Resp   15   SpO2   100

## 2023-06-12 DIAGNOSIS — I10 BENIGN ESSENTIAL HYPERTENSION: ICD-10-CM

## 2023-06-12 RX ORDER — HYDROCHLOROTHIAZIDE 25 MG/1
TABLET ORAL
Qty: 90 TABLET | Refills: 3 | Status: SHIPPED | OUTPATIENT
Start: 2023-06-12

## 2023-07-03 DIAGNOSIS — E78.5 HYPERLIPIDEMIA, UNSPECIFIED HYPERLIPIDEMIA TYPE: ICD-10-CM

## 2023-07-03 RX ORDER — EZETIMIBE AND SIMVASTATIN 10; 20 MG/1; MG/1
TABLET ORAL
Qty: 90 TABLET | Refills: 3 | Status: SHIPPED | OUTPATIENT
Start: 2023-07-03 | End: 2023-07-05

## 2023-07-04 DIAGNOSIS — E78.5 HYPERLIPIDEMIA, UNSPECIFIED HYPERLIPIDEMIA TYPE: ICD-10-CM

## 2023-07-05 RX ORDER — EZETIMIBE AND SIMVASTATIN 10; 20 MG/1; MG/1
TABLET ORAL
Qty: 90 TABLET | Refills: 3 | Status: SHIPPED | OUTPATIENT
Start: 2023-07-05

## 2023-08-02 ENCOUNTER — OFFICE VISIT (OUTPATIENT)
Dept: GASTROENTEROLOGY | Facility: CLINIC | Age: 88
End: 2023-08-02
Payer: MEDICARE

## 2023-08-02 VITALS
WEIGHT: 120 LBS | DIASTOLIC BLOOD PRESSURE: 76 MMHG | HEART RATE: 63 BPM | SYSTOLIC BLOOD PRESSURE: 136 MMHG | HEIGHT: 61 IN | BODY MASS INDEX: 22.66 KG/M2

## 2023-08-02 DIAGNOSIS — K64.4 EXTERNAL HEMORRHOIDS: ICD-10-CM

## 2023-08-02 DIAGNOSIS — K64.8 INTERNAL HEMORRHOIDS: ICD-10-CM

## 2023-08-02 DIAGNOSIS — Z85.038 HISTORY OF COLON CANCER IN ADULTHOOD: ICD-10-CM

## 2023-08-02 DIAGNOSIS — K56.699 SIGMOID STRICTURE (HCC): ICD-10-CM

## 2023-08-02 DIAGNOSIS — K57.90 DIVERTICULOSIS: ICD-10-CM

## 2023-08-02 DIAGNOSIS — K62.5 BRBPR (BRIGHT RED BLOOD PER RECTUM): Primary | ICD-10-CM

## 2023-08-02 PROCEDURE — 99214 OFFICE O/P EST MOD 30 MIN: CPT | Performed by: INTERNAL MEDICINE

## 2023-08-02 RX ORDER — DOCUSATE SODIUM 100 MG/1
100 CAPSULE, LIQUID FILLED ORAL 2 TIMES DAILY
Qty: 120 CAPSULE | Refills: 1 | Status: SHIPPED | OUTPATIENT
Start: 2023-08-02

## 2023-08-02 NOTE — PROGRESS NOTES
[facility] - Outpatient Follow-up Note  Laila Hall 80 y.o. female MRN: [id number]  Encounter: [id number]          ASSESSMENT AND PLAN:      1. BRBPR (bright red blood per rectum)  Small amount on the toilet paper when she has a hard stool    2. Internal hemorrhoids  Discussed possible band ligation pending course  - docusate sodium (COLACE) 100 mg capsule; Take 1 capsule (100 mg total) by mouth 2 (two) times a day  Dispense: 120 capsule; Refill: 1    3. External hemorrhoids  Asymptomatic    4. Diverticulosis  Asymptomatic  - docusate sodium (COLACE) 100 mg capsule; Take 1 capsule (100 mg total) by mouth 2 (two) times a day  Dispense: 120 capsule; Refill: 1    5. Sigmoid stricture (HCC)  Asymptomatic but precludes passage of colonoscope due to much luminal narrowing myochosis coli as well as multiple diverticuli  - docusate sodium (COLACE) 100 mg capsule; Take 1 capsule (100 mg total) by mouth 2 (two) times a day  Dispense: 120 capsule; Refill: 1    6. History of colon cancer in adulthood  2001 status post right hemicolectomy. Patient will otherwise follow-up in 6 months    ______________________________________________________________________    SUBJECTIVE: Very pleasant and active 27-year-old lady status post virtual colonoscopy due to the difficulty in negotiating her sigmoid with much luminal narrowing in myochosis: Multiple diverticuli. No significant lesions seen on the virtual colonoscopy is thickening of the walls of the sigmoid from myochosis coli. Patient was noted to have internal hemorrhoids discussed maintaining soft stool. Discussed band ligation pending course      REVIEW OF SYSTEMS IS OTHERWISE NEGATIVE.       Historical Information   Past Medical History:   Diagnosis Date   • A-fib [facility])    • 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[address])    • Community acquired pneumonia     last assessed: 10/11/16   • Hyperlipidemia    • Hypertension    • Irregular heart beat    • Kidney stone    • Lung abnormality    • Macular degeneration      Past Surgical History:   Procedure Laterality Date   • APPENDECTOMY     • BOWEL RESECTION     • CATARACT EXTRACTION EXTRACAPSULAR W/ INTRAOCULAR LENS IMPLANTATION Bilateral    • COLECTOMY      Partial   • COLON SURGERY      colon cancer   • COLONOSCOPY N/A 8/5/2016    Procedure: COLONOSCOPY;  Surgeon: Newton Rodriguez MD;  Location: [address] [place] [address] GI LAB; Service:    • EYE SURGERY     • HYSTERECTOMY     • THROAT SURGERY     • TRACHEAL SURGERY      tumor removed benign   • TUMOR REMOVAL N/A 2000    trachea     Social History   Social History     Substance and Sexual Activity   Alcohol Use No     Social History     Substance and Sexual Activity   Drug Use No     Social History     Tobacco Use   Smoking Status Never   Smokeless Tobacco Never     Family History   Problem Relation Age of Onset   • Diabetes Sister    • Hypertension Sister    • Stroke Mother    • Hypertension Mother        Meds/Allergies       Current Outpatient Medications:   •  alendronate (FOSAMAX) 70 mg tablet  •  docusate sodium (COLACE) 100 mg capsule  •  ezetimibe-simvastatin (VYTORIN) 10-20 mg per tablet  •  hydrochlorothiazide (HYDRODIURIL) 25 mg tablet  •  metoprolol tartrate (LOPRESSOR) 25 mg tablet  •  Flaxseed, Linseed, (Flax Seed Oil) 1000 MG CAPS  •  polyethylene glycol (GOLYTELY) 4000 mL solution    Allergies   Allergen Reactions   • Azithromycin    • Niacin And Related    • Keflex [Cephalexin] Rash           Objective     Blood pressure 136/76, pulse 63, height 5' 1" (1.549 m), weight 54.4 kg (120 lb). Body mass index is 22.67 kg/m². PHYSICAL EXAM:      General Appearance:   Alert, cooperative, no distress   HEENT:   Normocephalic, atraumatic, anicteric.     Neck:  Supple, symmetrical, trachea midline   Lungs:   Clear to auscultation bilaterally; no rales, rhonchi or wheezing; respirations unlabored    Heart[de-identified]   Regular rate and rhythm; no murmur, rub, or gallop.    Abdomen:   Soft, non-tender, non-distended; normal bowel sounds; no masses, no organomegaly    Genitalia:   Deferred    Rectal:   Deferred    Extremities:  No cyanosis, clubbing or edema    Pulses:  2+ and symmetric    Skin:  No jaundice, rashes, or lesions    Lymph nodes:  No palpable cervical lymphadenopathy        Lab Results:   No visits with results within 1 Day(s) from this visit. Latest known visit with results is:   Appointment on 04/20/2023   Component Date Value   • Sodium 04/20/2023 134 (L)    • Potassium 04/20/2023 4.1    • Chloride 04/20/2023 101    • CO2 04/20/2023 32    • ANION GAP 04/20/2023 1 (L)    • BUN 04/20/2023 12    • Creatinine 04/20/2023 0.58 (L)    • Glucose 04/20/2023 97    • Calcium 04/20/2023 8.6    • eGFR 04/20/2023 81          Radiology Results:   No results found.

## 2023-10-02 ENCOUNTER — TELEPHONE (OUTPATIENT)
Age: 88
End: 2023-10-02

## 2023-10-02 NOTE — TELEPHONE ENCOUNTER
Called and spoke with patient. Informed her per Dr Eng Needs at office visit patient should take the colace twice a day. Patient verbalized understanding.

## 2023-10-02 NOTE — TELEPHONE ENCOUNTER
Patients GI provider:  Dr. Aiden Guzman    Number to return call: [phone]    Reason for call: Pt calling because she states she received a call from the pharmacy about colace. She states she didn't know Dr Aiden Guzman wanted her to take this and has not been taking it.  She would like to know if she should start taking it now and how much she should take      Scheduled procedure/appointment date if applicable: N/A

## 2023-10-16 DIAGNOSIS — I48.91 ATRIAL FIBRILLATION WITH RAPID VENTRICULAR RESPONSE (HCC): ICD-10-CM

## 2023-10-18 ENCOUNTER — OFFICE VISIT (OUTPATIENT)
Dept: CARDIOLOGY CLINIC | Facility: CLINIC | Age: 88
End: 2023-10-18
Payer: MEDICARE

## 2023-10-18 VITALS
SYSTOLIC BLOOD PRESSURE: 112 MMHG | DIASTOLIC BLOOD PRESSURE: 68 MMHG | HEART RATE: 64 BPM | BODY MASS INDEX: 22.66 KG/M2 | HEIGHT: 61 IN | WEIGHT: 120 LBS | OXYGEN SATURATION: 96 %

## 2023-10-18 DIAGNOSIS — I50.32 CHRONIC DIASTOLIC CONGESTIVE HEART FAILURE (HCC): ICD-10-CM

## 2023-10-18 DIAGNOSIS — E78.49 OTHER HYPERLIPIDEMIA: ICD-10-CM

## 2023-10-18 DIAGNOSIS — I10 BENIGN ESSENTIAL HYPERTENSION: ICD-10-CM

## 2023-10-18 DIAGNOSIS — I48.0 PAROXYSMAL ATRIAL FIBRILLATION (HCC): Primary | ICD-10-CM

## 2023-10-18 PROCEDURE — 99214 OFFICE O/P EST MOD 30 MIN: CPT | Performed by: INTERNAL MEDICINE

## 2023-10-18 NOTE — PROGRESS NOTES
Cardiology Follow Up    Lucy Preciado  2/16/1933  [id number]      Interval History: Lucy Preciado is here for follow up of atrial fibrillation. She is feeling well without any shortness of breath, chest pain or shortness of breath. No repeat GI bleed. Xarelto was previous discontinued due to recurrent lower GI bleeds. No palpitations. No recent atrial fibrillation episodes. Her last documented episode of atrial fibrillation occurred in January 2019 during admission for pneumonia. Complicating pneumonia was atrial fibrillation and pleural effusion. She converted to sinus rhythm spontaneously and was started on Xarelto afterwards. Echocardiogram showed normal ejection fraction with diastolic dysfunction. The following portions of the patient's history were reviewed and updated as appropriate: She  has a past medical history of A-fib ([address]), 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[address]), Community acquired pneumonia, Hyperlipidemia, Hypertension, Irregular heart beat, Kidney stone, Lung abnormality, and Macular degeneration. She  has a past surgical history that includes Hysterectomy; Tumor removal (N/A, 2000); Colon surgery; Bowel resection; Cataract extraction, extracapsular w/ intraocular lens implant (Bilateral); Tracheal surgery; Appendectomy; Colonoscopy (N/A, 8/5/2016); Colectomy; Throat surgery; and Eye surgery. Her family history includes Diabetes in her sister; Hypertension in her mother and sister; Stroke in her mother. She  reports that she has never smoked. She has never used smokeless tobacco. She reports that she does not drink alcohol and does not use drugs.   Current Outpatient Medications   Medication Sig Dispense Refill    alendronate (FOSAMAX) 70 mg tablet TAKE ONE TABLET BY MOUTH ONCE EVERY WEEK 12 tablet 3    docusate sodium (COLACE) 100 mg capsule Take 1 capsule (100 mg total) by mouth 2 (two) times a day 120 capsule 1    ezetimibe-simvastatin (VYTORIN) 10-20 mg per tablet TAKE ONE TABLET BY MOUTH AT BEDTIME 90 tablet 3    hydrochlorothiazide (HYDRODIURIL) 25 mg tablet TAKE ONE TABLET BY MOUTH EVERY DAY (HYDRODUIRIL) 90 tablet 3    metoprolol tartrate (LOPRESSOR) 25 mg tablet Take 1 tablet (25 mg total) by mouth every 12 (twelve) hours 180 tablet 3    Flaxseed, Linseed, (Flax Seed Oil) 1000 MG CAPS Take by mouth (Patient not taking: Reported on 3/27/2023)      polyethylene glycol (GOLYTELY) 4000 mL solution Take 4,000 mL by mouth once for 1 dose 4000 mL 0     No current facility-administered medications for this visit. She is allergic to azithromycin, niacin and related, and keflex [cephalexin]. .      Review of Systems:  Review of Systems   Eyes:  Positive for visual disturbance. Respiratory:  Negative for chest tightness and shortness of breath. Cardiovascular:  Negative for chest pain, palpitations and leg swelling. Musculoskeletal:  Positive for arthralgias. All other systems reviewed and are negative. Physical Exam:  /68 (BP Location: Right arm, Patient Position: Sitting, Cuff Size: Standard)   Pulse 64   Ht 5' 1" (1.549 m)   Wt 54.4 kg (120 lb)   SpO2 96%   BMI 22.67 kg/m²     Physical Exam  Constitutional:       General: She is not in acute distress. Appearance: Normal appearance. She is well-developed. She is not diaphoretic. HENT:      Head: Normocephalic and atraumatic. Eyes:      General: No scleral icterus. Conjunctiva/sclera: Conjunctivae normal.      Pupils: Pupils are equal, round, and reactive to light. Neck:      Thyroid: No thyromegaly. Vascular: No JVD. Cardiovascular:      Rate and Rhythm: Normal rate and regular rhythm. Heart sounds: Normal heart sounds. No murmur heard. No friction rub. No gallop. Pulmonary:      Effort: Pulmonary effort is normal.      Breath sounds: Normal breath sounds. Musculoskeletal:      Cervical back: Neck supple. Right lower leg: No edema. Left lower leg: No edema. Skin:     General: Skin is warm and dry. Findings: No erythema or rash. Neurological:      General: No focal deficit present. Mental Status: She is alert and oriented to person, place, and time. Mental status is at baseline. Psychiatric:         Mood and Affect: Mood normal.         Behavior: Behavior normal.         Thought Content: Thought content normal.         Judgment: Judgment normal.         Cardiographics  ECG:  Normal sinus rhythm  rate of 65 beats per min    Labs:  Lab Results   Component Value Date     12/27/2013    K 4.1 04/20/2023    K 3.9 12/27/2013     04/20/2023     12/27/2013    CO2 32 04/20/2023    CO2 33 12/27/2013    BUN 12 04/20/2023    BUN 15 12/27/2013    CREATININE 0.58 (L) 04/20/2023    CREATININE 0.7 12/27/2013    CALCIUM 8.6 04/20/2023    CALCIUM 8.9 12/27/2013     Lab Results   Component Value Date    WBC 6.89 04/05/2023    HGB 15.4 04/05/2023    HCT 47.7 (H) 04/05/2023    MCV 97 04/05/2023     04/05/2023     Lab Results   Component Value Date    CHOL 128 12/27/2013    TRIG 140 04/05/2023    TRIG 155 12/27/2013    HDL 37 (L) 04/05/2023    HDL 39 12/27/2013      Discussion/Summary:  1. Paroxysmal atrial fibrillation (HCC)    2. Benign essential hypertension    3. Other hyperlipidemia    4. Chronic diastolic congestive heart failure ([address])      - she remains in sinus rhythm. No recent episodes of atrial fibrillation. She is currently off Xarelto because of GI bleeding.  - cough stopped with discontinuation of lisinopril   -  continue metoprolol 25 mg b.i.d. And hydrochlorothiazide 25 mg daily. Blood pressure is at goal.  - continue Vytorin. Last LDL cholesterol was at goal.  - Blood work ordered.

## 2023-10-19 PROCEDURE — 93000 ELECTROCARDIOGRAM COMPLETE: CPT | Performed by: INTERNAL MEDICINE

## 2023-10-25 ENCOUNTER — APPOINTMENT (OUTPATIENT)
Dept: LAB | Facility: CLINIC | Age: 88
End: 2023-10-25
Payer: MEDICARE

## 2023-10-25 LAB
ANION GAP SERPL CALCULATED.3IONS-SCNC: 7 MMOL/L
BUN SERPL-MCNC: 15 MG/DL (ref 5–25)
CALCIUM SERPL-MCNC: 8.8 MG/DL (ref 8.4–10.2)
CHLORIDE SERPL-SCNC: 97 MMOL/L (ref 96–108)
CHOLEST SERPL-MCNC: 142 MG/DL
CO2 SERPL-SCNC: 33 MMOL/L (ref 21–32)
CREAT SERPL-MCNC: 0.48 MG/DL (ref 0.6–1.3)
ERYTHROCYTE [DISTWIDTH] IN BLOOD BY AUTOMATED COUNT: 13.2 % (ref 11.6–15.1)
GFR SERPL CREATININE-BSD FRML MDRD: 86 ML/MIN/1.73SQ M
GLUCOSE P FAST SERPL-MCNC: 101 MG/DL (ref 65–99)
HCT VFR BLD AUTO: 46.6 % (ref 34.8–46.1)
HDLC SERPL-MCNC: 42 MG/DL
HGB BLD-MCNC: 15.5 G/DL (ref 11.5–15.4)
LDLC SERPL CALC-MCNC: 81 MG/DL (ref 0–100)
MCH RBC QN AUTO: 32.1 PG (ref 26.8–34.3)
MCHC RBC AUTO-ENTMCNC: 33.3 G/DL (ref 31.4–37.4)
MCV RBC AUTO: 97 FL (ref 82–98)
NONHDLC SERPL-MCNC: 100 MG/DL
PLATELET # BLD AUTO: 333 THOUSANDS/UL (ref 149–390)
PMV BLD AUTO: 9.4 FL (ref 8.9–12.7)
POTASSIUM SERPL-SCNC: 3.9 MMOL/L (ref 3.5–5.3)
RBC # BLD AUTO: 4.83 MILLION/UL (ref 3.81–5.12)
SODIUM SERPL-SCNC: 137 MMOL/L (ref 135–147)
TRIGL SERPL-MCNC: 95 MG/DL
WBC # BLD AUTO: 6.56 THOUSAND/UL (ref 4.31–10.16)

## 2023-10-26 ENCOUNTER — TELEPHONE (OUTPATIENT)
Dept: CARDIOLOGY CLINIC | Facility: CLINIC | Age: 88
End: 2023-10-26

## 2023-10-26 NOTE — TELEPHONE ENCOUNTER
----- Message from Aurelio Asif, [address] [place] [address] sent at 10/26/2023  8:57 AM EDT -----  Please let patient know that her labs look very good and appear to be at her baseline her cholesterol panel is also very good. Continue current medications.

## 2024-04-14 DIAGNOSIS — M81.0 OSTEOPOROSIS, UNSPECIFIED OSTEOPOROSIS TYPE, UNSPECIFIED PATHOLOGICAL FRACTURE PRESENCE: ICD-10-CM

## 2024-04-16 RX ORDER — ALENDRONATE SODIUM 70 MG/1
70 TABLET ORAL WEEKLY
Qty: 12 TABLET | Refills: 0 | Status: SHIPPED | OUTPATIENT
Start: 2024-04-16

## 2024-04-22 ENCOUNTER — OFFICE VISIT (OUTPATIENT)
Dept: FAMILY MEDICINE CLINIC | Facility: CLINIC | Age: 89
End: 2024-04-22
Payer: MEDICARE

## 2024-04-22 VITALS
BODY MASS INDEX: 21.41 KG/M2 | DIASTOLIC BLOOD PRESSURE: 60 MMHG | RESPIRATION RATE: 16 BRPM | SYSTOLIC BLOOD PRESSURE: 116 MMHG | HEART RATE: 88 BPM | OXYGEN SATURATION: 94 % | WEIGHT: 113.4 LBS | HEIGHT: 61 IN | TEMPERATURE: 98.2 F

## 2024-04-22 DIAGNOSIS — R05.1 ACUTE COUGH: ICD-10-CM

## 2024-04-22 DIAGNOSIS — J40 BRONCHITIS: Primary | ICD-10-CM

## 2024-04-22 PROCEDURE — 99214 OFFICE O/P EST MOD 30 MIN: CPT | Performed by: FAMILY MEDICINE

## 2024-04-22 PROCEDURE — G2211 COMPLEX E/M VISIT ADD ON: HCPCS | Performed by: FAMILY MEDICINE

## 2024-04-22 RX ORDER — DOXYCYCLINE HYCLATE 100 MG/1
100 CAPSULE ORAL EVERY 12 HOURS SCHEDULED
Qty: 14 CAPSULE | Refills: 0 | Status: SHIPPED | OUTPATIENT
Start: 2024-04-22 | End: 2024-04-29

## 2024-04-22 NOTE — PROGRESS NOTES
Chief Complaint   Patient presents with   • chest congestion     Started Friday or Saturday        Patient ID: Monse Murray is a 89 y.o. female.    Cough  This is a new problem. The current episode started in the past 7 days. The problem has been unchanged. The problem occurs every few minutes. The cough is Non-productive. Associated symptoms include nasal congestion and postnasal drip. Pertinent negatives include no chest pain, chills, ear congestion, ear pain, fever, headaches, heartburn, hemoptysis, myalgias, rash, rhinorrhea, sore throat, shortness of breath, sweats, weight loss or wheezing. The symptoms are aggravated by lying down. She has tried nothing for the symptoms. The treatment provided no relief. There is no history of asthma or pneumonia.         The following portions of the patient's history were reviewed and updated as appropriate: allergies, current medications, past family history, past medical history, past social history, past surgical history and problem list.    Review of Systems   Constitutional:  Negative for chills, fatigue, fever and weight loss.   HENT:  Positive for postnasal drip. Negative for ear pain, rhinorrhea and sore throat.    Respiratory:  Positive for cough. Negative for hemoptysis, shortness of breath and wheezing.    Cardiovascular:  Negative for chest pain.   Gastrointestinal: Negative.  Negative for heartburn.   Musculoskeletal:  Negative for myalgias.   Skin:  Negative for rash.   Neurological:  Negative for headaches.       Current Outpatient Medications   Medication Sig Dispense Refill   • alendronate (FOSAMAX) 70 mg tablet TAKE ONE TABLET BY MOUTH ONCE A WEEK 12 tablet 0   • ezetimibe-simvastatin (VYTORIN) 10-20 mg per tablet TAKE ONE TABLET BY MOUTH AT BEDTIME 90 tablet 3   • hydrochlorothiazide (HYDRODIURIL) 25 mg tablet TAKE ONE TABLET BY MOUTH EVERY DAY (HYDRODUIRIL) 90 tablet 3   • metoprolol tartrate (LOPRESSOR) 25 mg tablet Take 1 tablet (25 mg total) by mouth  "every 12 (twelve) hours 180 tablet 3   • docusate sodium (COLACE) 100 mg capsule Take 1 capsule (100 mg total) by mouth 2 (two) times a day (Patient not taking: Reported on 4/22/2024) 120 capsule 1   • Flaxseed, Linseed, (Flax Seed Oil) 1000 MG CAPS Take by mouth (Patient not taking: Reported on 3/27/2023)     • polyethylene glycol (GOLYTELY) 4000 mL solution Take 4,000 mL by mouth once for 1 dose 4000 mL 0     No current facility-administered medications for this visit.       Objective:    /60 (BP Location: Left arm, Patient Position: Sitting, Cuff Size: Standard)   Pulse 88   Temp 98.2 °F (36.8 °C) (Temporal)   Resp 16   Ht 5' 1\" (1.549 m)   Wt 51.4 kg (113 lb 6.4 oz)   SpO2 94%   BMI 21.43 kg/m²        Physical Exam  Constitutional:       General: She is not in acute distress.     Appearance: She is not ill-appearing.   HENT:      Nose: No congestion or rhinorrhea.      Mouth/Throat:      Pharynx: No oropharyngeal exudate or posterior oropharyngeal erythema.   Cardiovascular:      Rate and Rhythm: Normal rate and regular rhythm.   Pulmonary:      Effort: Pulmonary effort is normal. No respiratory distress.      Breath sounds: Rhonchi (few) present. No wheezing or rales.   Musculoskeletal:      Right lower leg: No edema.      Left lower leg: No edema.   Neurological:      Mental Status: She is alert.                 Assessment/Plan:         Diagnoses and all orders for this visit:    Bronchitis  -     XR chest pa & lateral; Future  -     doxycycline hyclate (VIBRAMYCIN) 100 mg capsule; Take 1 capsule (100 mg total) by mouth every 12 (twelve) hours for 7 days    Acute cough      Mucinex OTC    Rto in 1 wk for AWV and f/u                     Beckie García MD      "

## 2024-04-23 ENCOUNTER — APPOINTMENT (OUTPATIENT)
Dept: RADIOLOGY | Facility: CLINIC | Age: 89
End: 2024-04-23
Payer: MEDICARE

## 2024-04-23 DIAGNOSIS — J40 BRONCHITIS: ICD-10-CM

## 2024-04-23 PROCEDURE — 71046 X-RAY EXAM CHEST 2 VIEWS: CPT

## 2024-04-26 ENCOUNTER — TELEPHONE (OUTPATIENT)
Dept: FAMILY MEDICINE CLINIC | Facility: CLINIC | Age: 89
End: 2024-04-26

## 2024-04-26 ENCOUNTER — TELEPHONE (OUTPATIENT)
Age: 89
End: 2024-04-26

## 2024-04-30 ENCOUNTER — TELEPHONE (OUTPATIENT)
Dept: OTHER | Facility: OTHER | Age: 89
End: 2024-04-30

## 2024-04-30 ENCOUNTER — OFFICE VISIT (OUTPATIENT)
Dept: FAMILY MEDICINE CLINIC | Facility: CLINIC | Age: 89
End: 2024-04-30
Payer: MEDICARE

## 2024-04-30 ENCOUNTER — TELEPHONE (OUTPATIENT)
Age: 89
End: 2024-04-30

## 2024-04-30 VITALS
RESPIRATION RATE: 18 BRPM | HEART RATE: 67 BPM | SYSTOLIC BLOOD PRESSURE: 92 MMHG | WEIGHT: 114 LBS | OXYGEN SATURATION: 92 % | BODY MASS INDEX: 21.52 KG/M2 | DIASTOLIC BLOOD PRESSURE: 68 MMHG | TEMPERATURE: 93 F | HEIGHT: 61 IN

## 2024-04-30 DIAGNOSIS — R06.2 WHEEZING: ICD-10-CM

## 2024-04-30 DIAGNOSIS — R05.1 ACUTE COUGH: Primary | ICD-10-CM

## 2024-04-30 PROCEDURE — G2211 COMPLEX E/M VISIT ADD ON: HCPCS | Performed by: FAMILY MEDICINE

## 2024-04-30 PROCEDURE — 99214 OFFICE O/P EST MOD 30 MIN: CPT | Performed by: FAMILY MEDICINE

## 2024-04-30 RX ORDER — LEVOFLOXACIN 500 MG/1
500 TABLET, FILM COATED ORAL EVERY 24 HOURS
Qty: 7 TABLET | Refills: 0 | Status: SHIPPED | OUTPATIENT
Start: 2024-04-30 | End: 2024-05-07

## 2024-04-30 RX ORDER — DEXTROMETHORPHAN HYDROBROMIDE AND PROMETHAZINE HYDROCHLORIDE 15; 6.25 MG/5ML; MG/5ML
5 SYRUP ORAL 4 TIMES DAILY PRN
Qty: 118 ML | Refills: 0 | Status: SHIPPED | OUTPATIENT
Start: 2024-04-30 | End: 2024-05-07

## 2024-04-30 NOTE — TELEPHONE ENCOUNTER
I spoke with pharmacy ,insurance had a hold on the levaquin until  gave an ok for the dose . I spoke with Dr Butts and she gave the approval for the levaquin to be prescribed .pharmacy and patient notified

## 2024-04-30 NOTE — PROGRESS NOTES
Chief Complaint   Patient presents with   • Follow-up     Patient was diagnosed with pneumonia and she is not feeling good. She finished her medications and still has a bad cough , coughing up yellowish mucus .        Patient ID: Monse Murray is a 89 y.o. female.    HPI  Pt is seeing for f/u cough and congestion -  CXR showed possible pneumonia-   was Tx with Doxy for 1 wk -  minimally better -  has cough and wheezing -  eating OK  -  no SOB, no fever     The following portions of the patient's history were reviewed and updated as appropriate: allergies, current medications, past family history, past medical history, past social history, past surgical history and problem list.    Review of Systems   Constitutional:  Positive for appetite change and fatigue. Negative for activity change and fever.   HENT: Negative.     Respiratory:  Positive for cough and wheezing. Negative for chest tightness and shortness of breath.    Cardiovascular: Negative.    Gastrointestinal: Negative.    Musculoskeletal: Negative.        Current Outpatient Medications   Medication Sig Dispense Refill   • alendronate (FOSAMAX) 70 mg tablet TAKE ONE TABLET BY MOUTH ONCE A WEEK 12 tablet 0   • ezetimibe-simvastatin (VYTORIN) 10-20 mg per tablet TAKE ONE TABLET BY MOUTH AT BEDTIME 90 tablet 3   • hydrochlorothiazide (HYDRODIURIL) 25 mg tablet TAKE ONE TABLET BY MOUTH EVERY DAY (HYDRODUIRIL) 90 tablet 3   •    0   • metoprolol tartrate (LOPRESSOR) 25 mg tablet Take 1 tablet (25 mg total) by mouth every 12 (twelve) hours 180 tablet 3   •    0   • docusate sodium (COLACE) 100 mg capsule Take 1 capsule (100 mg total) by mouth 2 (two) times a day (Patient not taking: Reported on 4/22/2024) 120 capsule 1   • Flaxseed, Linseed, (Flax Seed Oil) 1000 MG CAPS Take by mouth (Patient not taking: Reported on 3/27/2023)     • polyethylene glycol (GOLYTELY) 4000 mL solution Take 4,000 mL by mouth once for 1 dose 4000 mL 0     No current facility-administered  "medications for this visit.       Objective:    BP 92/68 (BP Location: Right arm, Patient Position: Sitting, Cuff Size: Standard)   Pulse 67   Temp (!) 93 °F (33.9 °C)   Resp 18   Ht 5' 1\" (1.549 m)   Wt 51.7 kg (114 lb)   SpO2 92%   BMI 21.54 kg/m²        Physical Exam  Constitutional:       General: She is not in acute distress.     Appearance: She is not ill-appearing.   Cardiovascular:      Rate and Rhythm: Normal rate and regular rhythm.   Pulmonary:      Effort: Pulmonary effort is normal. No respiratory distress.      Breath sounds: Wheezing present. No rhonchi or rales.   Musculoskeletal:      Right lower leg: No edema.      Left lower leg: No edema.   Neurological:      General: No focal deficit present.      Mental Status: She is alert and oriented to person, place, and time.   Psychiatric:         Mood and Affect: Mood normal.                 Assessment/Plan:         Diagnoses and all orders for this visit:    Acute cough  -     levofloxacin (LEVAQUIN) 500 mg tablet; Take 1 tablet (500 mg total) by mouth every 24 hours for 7 days  -     promethazine-dextromethorphan (PHENERGAN-DM) 6.25-15 mg/5 mL oral syrup; Take 5 mL by mouth 4 (four) times a day as needed for cough    Wheezing  -     levofloxacin (LEVAQUIN) 500 mg tablet; Take 1 tablet (500 mg total) by mouth every 24 hours for 7 days            Rto in 1 wk                 Beckie García MD      "

## 2024-04-30 NOTE — TELEPHONE ENCOUNTER
Pt called asking to speak directly with PCP about the medication she has been prescribed.  Pt is asking for a call today

## 2024-04-30 NOTE — TELEPHONE ENCOUNTER
Pharmacy [facility] called in regards of Levothyroxine 500 mg dosage. Creatinine clearance is required. Please call [phone] to follow up.

## 2024-05-07 ENCOUNTER — OFFICE VISIT (OUTPATIENT)
Dept: FAMILY MEDICINE CLINIC | Facility: CLINIC | Age: 89
End: 2024-05-07
Payer: MEDICARE

## 2024-05-07 VITALS
RESPIRATION RATE: 18 BRPM | OXYGEN SATURATION: 92 % | BODY MASS INDEX: 21.52 KG/M2 | TEMPERATURE: 96.4 F | WEIGHT: 114 LBS | DIASTOLIC BLOOD PRESSURE: 74 MMHG | SYSTOLIC BLOOD PRESSURE: 110 MMHG | HEIGHT: 61 IN | HEART RATE: 78 BPM

## 2024-05-07 DIAGNOSIS — R20.0 NUMBNESS IN FEET: ICD-10-CM

## 2024-05-07 DIAGNOSIS — E78.49 OTHER HYPERLIPIDEMIA: ICD-10-CM

## 2024-05-07 DIAGNOSIS — Z00.00 MEDICARE ANNUAL WELLNESS VISIT, SUBSEQUENT: ICD-10-CM

## 2024-05-07 DIAGNOSIS — J18.9 PNEUMONIA DUE TO INFECTIOUS ORGANISM, UNSPECIFIED LATERALITY, UNSPECIFIED PART OF LUNG: Primary | ICD-10-CM

## 2024-05-07 PROCEDURE — 99213 OFFICE O/P EST LOW 20 MIN: CPT | Performed by: FAMILY MEDICINE

## 2024-05-07 PROCEDURE — G0439 PPPS, SUBSEQ VISIT: HCPCS | Performed by: FAMILY MEDICINE

## 2024-05-07 NOTE — PATIENT INSTRUCTIONS
Medicare Preventive Visit Patient Instructions  Thank you for completing your Welcome to Medicare Visit or Medicare Annual Wellness Visit today. Your next wellness visit will be due in one year (5/8/2025).  The screening/preventive services that you may require over the next 5-10 years are detailed below. Some tests may not apply to you based off risk factors and/or age. Screening tests ordered at today's visit but not completed yet may show as past due. Also, please note that scanned in results may not display below.  Preventive Screenings:  Service Recommendations Previous Testing/Comments   Colorectal Cancer Screening  * Colonoscopy    * Fecal Occult Blood Test (FOBT)/Fecal Immunochemical Test (FIT)  * Fecal DNA/Cologuard Test  * Flexible Sigmoidoscopy Age: 45-75 years old   Colonoscopy: every 10 years (may be performed more frequently if at higher risk)  OR  FOBT/FIT: every 1 year  OR  Cologuard: every 3 years  OR  Sigmoidoscopy: every 5 years  Screening may be recommended earlier than age 45 if at higher risk for colorectal cancer. Also, an individualized decision between you and your healthcare provider will decide whether screening between the ages of 76-85 would be appropriate. Colonoscopy: 05/22/2023  FOBT/FIT: Not on file  Cologuard: Not on file  Sigmoidoscopy: Not on file          Breast Cancer Screening Age: 40+ years old  Frequency: every 1-2 years  Not required if history of left and right mastectomy Mammogram: Not on file        Cervical Cancer Screening Between the ages of 21-29, pap smear recommended once every 3 years.   Between the ages of 30-65, can perform pap smear with HPV co-testing every 5 years.   Recommendations may differ for women with a history of total hysterectomy, cervical cancer, or abnormal pap smears in past. Pap Smear: Not on file        Hepatitis C Screening Once for adults born between 1945 and 1965  More frequently in patients at high risk for Hepatitis C Hep C Antibody: Not  on file        Diabetes Screening 1-2 times per year if you're at risk for diabetes or have pre-diabetes Fasting glucose: 101 mg/dL (10/25/2023)  A1C: No results in last 5 years (No results in last 5 years)      Cholesterol Screening Once every 5 years if you don't have a lipid disorder. May order more often based on risk factors. Lipid panel: 10/25/2023          Other Preventive Screenings Covered by Medicare:  Abdominal Aortic Aneurysm (AAA) Screening: covered once if your at risk. You're considered to be at risk if you have a family history of AAA.  Lung Cancer Screening: covers low dose CT scan once per year if you meet all of the following conditions: (1) Age 55-77; (2) No signs or symptoms of lung cancer; (3) Current smoker or have quit smoking within the last 15 years; (4) You have a tobacco smoking history of at least 20 pack years (packs per day multiplied by number of years you smoked); (5) You get a written order from a healthcare provider.  Glaucoma Screening: covered annually if you're considered high risk: (1) You have diabetes OR (2) Family history of glaucoma OR (3)  aged 50 and older OR (4)  American aged 65 and older  Osteoporosis Screening: covered every 2 years if you meet one of the following conditions: (1) You're estrogen deficient and at risk for osteoporosis based off medical history and other findings; (2) Have a vertebral abnormality; (3) On glucocorticoid therapy for more than 3 months; (4) Have primary hyperparathyroidism; (5) On osteoporosis medications and need to assess response to drug therapy.   Last bone density test (DXA Scan): 06/22/2022.  HIV Screening: covered annually if you're between the age of 15-65. Also covered annually if you are younger than 15 and older than 65 with risk factors for HIV infection. For pregnant patients, it is covered up to 3 times per pregnancy.    Immunizations:  Immunization Recommendations   Influenza Vaccine Annual influenza  vaccination during flu season is recommended for all persons aged >= 6 months who do not have contraindications   Pneumococcal Vaccine   * Pneumococcal conjugate vaccine = PCV13 (Prevnar 13), PCV15 (Vaxneuvance), PCV20 (Prevnar 20)  * Pneumococcal polysaccharide vaccine = PPSV23 (Pneumovax) Adults 19-63 yo with certain risk factors or if 65+ yo  If never received any pneumonia vaccine: recommend Prevnar 20 (PCV20)  Give PCV20 if previously received 1 dose of PCV13 or PPSV23   Hepatitis B Vaccine 3 dose series if at intermediate or high risk (ex: diabetes, end stage renal disease, liver disease)   Respiratory syncytial virus (RSV) Vaccine - COVERED BY MEDICARE PART D  * RSVPreF3 (Arexvy) CDC recommends that adults 60 years of age and older may receive a single dose of RSV vaccine using shared clinical decision-making (SCDM)   Tetanus (Td) Vaccine - COST NOT COVERED BY MEDICARE PART B Following completion of primary series, a booster dose should be given every 10 years to maintain immunity against tetanus. Td may also be given as tetanus wound prophylaxis.   Tdap Vaccine - COST NOT COVERED BY MEDICARE PART B Recommended at least once for all adults. For pregnant patients, recommended with each pregnancy.   Shingles Vaccine (Shingrix) - COST NOT COVERED BY MEDICARE PART B  2 shot series recommended in those 19 years and older who have or will have weakened immune systems or those 50 years and older     Health Maintenance Due:  There are no preventive care reminders to display for this patient.  Immunizations Due:      Topic Date Due   • COVID-19 Vaccine (4 - 2023-24 season) 09/01/2023     Advance Directives   What are advance directives?  Advance directives are legal documents that state your wishes and plans for medical care. These plans are made ahead of time in case you lose your ability to make decisions for yourself. Advance directives can apply to any medical decision, such as the treatments you want, and if you  want to donate organs.   What are the types of advance directives?  There are many types of advance directives, and each state has rules about how to use them. You may choose a combination of any of the following:  Living will:  This is a written record of the treatment you want. You can also choose which treatments you do not want, which to limit, and which to stop at a certain time. This includes surgery, medicine, IV fluid, and tube feedings.   Durable power of  for healthcare (DPAHC):  This is a written record that states who you want to make healthcare choices for you when you are unable to make them for yourself. This person, called a proxy, is usually a family member or a friend. You may choose more than 1 proxy.  Do not resuscitate (DNR) order:  A DNR order is used in case your heart stops beating or you stop breathing. It is a request not to have certain forms of treatment, such as CPR. A DNR order may be included in other types of advance directives.  Medical directive:  This covers the care that you want if you are in a coma, near death, or unable to make decisions for yourself. You can list the treatments you want for each condition. Treatment may include pain medicine, surgery, blood transfusions, dialysis, IV or tube feedings, and a ventilator (breathing machine).  Values history:  This document has questions about your views, beliefs, and how you feel and think about life. This information can help others choose the care that you would choose.  Why are advance directives important?  An advance directive helps you control your care. Although spoken wishes may be used, it is better to have your wishes written down. Spoken wishes can be misunderstood, or not followed. Treatments may be given even if you do not want them. An advance directive may make it easier for your family to make difficult choices about your care.   Urinary Incontinence   Urinary incontinence (UI)  is when you lose control of  your bladder. UI develops because your bladder cannot store or empty urine properly. The 3 most common types of UI are stress incontinence, urge incontinence, or both.  Medicines:   May be given to help strengthen your bladder control. Report any side effects of medication to your healthcare provider.  Do pelvic muscle exercises often:  Your pelvic muscles help you stop urinating. Squeeze these muscles tight for 5 seconds, then relax for 5 seconds. Gradually work up to squeezing for 10 seconds. Do 3 sets of 15 repetitions a day, or as directed. This will help strengthen your pelvic muscles and improve bladder control.  Train your bladder:  Go to the bathroom at set times, such as every 2 hours, even if you do not feel the urge to go. You can also try to hold your urine when you feel the urge to go. For example, hold your urine for 5 minutes when you feel the urge to go. As that becomes easier, hold your urine for 10 minutes.   Self-care:   Keep a UI record.  Write down how often you leak urine and how much you leak. Make a note of what you were doing when you leaked urine.  Drink liquids as directed. You may need to limit the amount of liquid you drink to help control your urine leakage. Do not drink any liquid right before you go to bed. Limit or do not have drinks that contain caffeine or alcohol.   Prevent constipation.  Eat a variety of high-fiber foods. Good examples are high-fiber cereals, beans, vegetables, and whole-grain breads. Walking is the best way to trigger your intestines to have a bowel movement.  Exercise regularly and maintain a healthy weight.  Weight loss and exercise will decrease pressure on your bladder and help you control your leakage.   Use a catheter as directed  to help empty your bladder. A catheter is a tiny, plastic tube that is put into your bladder to drain your urine.   Go to behavior therapy as directed.  Behavior therapy may be used to help you learn to control your urge to  urinate.     © Copyright [employer] 2018 Information is for End User's use only and may not be sold, redistributed or otherwise used for commercial purposes. All illustrations and images included in CareNotes® are the copyrighted property of [employer] or [employer]

## 2024-05-07 NOTE — PROGRESS NOTES
Assessment and Plan:     Problem List Items Addressed This Visit          Other    Other hyperlipidemia     Other Visit Diagnoses       Pneumonia due to infectious organism, unspecified laterality, unspecified part of lung    -  Primary    Relevant Orders       Resolving     XR chest pa & lateral    Medicare annual wellness visit, subsequent        Numbness in feet        Declined arterial doppler            Rto in 6 m   Preventive health issues were discussed with patient, and age appropriate screening tests were ordered as noted in patient's After Visit Summary.  Personalized health advice and appropriate referrals for health education or preventive services given if needed, as noted in patient's After Visit Summary.     History of Present Illness:     Patient presents for a Medicare Wellness Visit    Pt is seeing for c/u pneumonia -  improved a lot        Patient Care Team:  Beckie García MD as PCP - General  MD Tushar Jimenez MD as Endoscopist  Orquidea Chaudhry DO (Cardiology)     Review of Systems:     Review of Systems   Constitutional: Negative.    Respiratory:  Positive for cough (residual, minimal). Negative for chest tightness, shortness of breath and wheezing.    Cardiovascular: Negative.    Gastrointestinal: Negative.    Genitourinary: Negative.    Musculoskeletal: Negative.    Skin: Negative.    Neurological:  Positive for numbness (in both feet when in supine position for several years).        Problem List:     Patient Active Problem List   Diagnosis   • Benign essential hypertension   • Other hyperlipidemia   • Macular degeneration   • Post-menopausal osteoporosis   • Pleural effusion on right   • Mediastinal adenopathy   • Renal cyst   • Thrombocytosis   • Paroxysmal atrial fibrillation (HCC)   • Lung nodule   • Chronic diastolic congestive heart failure (HCC)   • Dyspnea on exertion   • On continuous oral anticoagulation   • History of colon cancer   • Osteoporosis       Past Medical and Surgical History:     Past Medical History:   Diagnosis Date   • A-fib (HCC)    • 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HCC)    • Community acquired pneumonia     last assessed: 10/11/16   • Hyperlipidemia    • Hypertension    • Irregular heart beat    • Kidney stone    • Lung abnormality    • Macular degeneration      Past Surgical History:   Procedure Laterality Date   • APPENDECTOMY     • BOWEL RESECTION     • CATARACT EXTRACTION EXTRACAPSULAR W/ INTRAOCULAR LENS IMPLANTATION Bilateral    • COLECTOMY      Partial   • COLON SURGERY      colon cancer   • COLONOSCOPY N/A 8/5/2016    Procedure: COLONOSCOPY;  Surgeon: Tushar Fritz MD;  Location: [facility];  Service:    • EYE SURGERY     • HYSTERECTOMY     • THROAT SURGERY     • TRACHEAL SURGERY      tumor removed benign   • TUMOR REMOVAL N/A 2000    trachea      Family History:     Family History   Problem Relation Age of Onset   • Diabetes Sister    • Hypertension Sister    • Stroke Mother    • Hypertension Mother       Social History:     Social History     Socioeconomic History   • Marital status:      Spouse name: None   • Number of children: None   • Years of education: None   • Highest education level: None   Occupational History   • None   Tobacco Use   • Smoking status: Never   • Smokeless tobacco: Never   Vaping Use   • Vaping status: Never Used   Substance and Sexual Activity   • Alcohol use: No   • Drug use: No   • Sexual activity: None     Comment: NOT ASKED   Other Topics Concern   • None   Social History Narrative    Daily tea consumption ( 1 cups/day)     Social Determinants of Health     Financial Resource Strain: Low Risk  (3/27/2023)    Overall Financial Resource Strain (CARDIA)    • Difficulty of Paying Living Expenses: Not hard at all   Food Insecurity: No Food Insecurity (5/7/2024)    Hunger Vital Sign    • Worried About Running Out of Food in the Last Year: Never true    • Ran Out of Food in the Last Year: Never true    Transportation Needs: No Transportation Needs (5/7/2024)    PRAPARE - Transportation    • Lack of Transportation (Medical): No    • Lack of Transportation (Non-Medical): No   Physical Activity: Not on file   Stress: Not on file   Social Connections: Not on file   Intimate Partner Violence: Not on file   Housing Stability: Unknown (5/7/2024)    Housing Stability Vital Sign    • Unable to Pay for Housing in the Last Year: No    • Number of Places Lived in the Last Year: Not on file    • Unstable Housing in the Last Year: No      Medications and Allergies:     Current Outpatient Medications   Medication Sig Dispense Refill   • alendronate (FOSAMAX) 70 mg tablet TAKE ONE TABLET BY MOUTH ONCE A WEEK 12 tablet 0   • ezetimibe-simvastatin (VYTORIN) 10-20 mg per tablet TAKE ONE TABLET BY MOUTH AT BEDTIME 90 tablet 3   • hydrochlorothiazide (HYDRODIURIL) 25 mg tablet TAKE ONE TABLET BY MOUTH EVERY DAY (HYDRODUIRIL) 90 tablet 3   • metoprolol tartrate (LOPRESSOR) 25 mg tablet Take 1 tablet (25 mg total) by mouth every 12 (twelve) hours 180 tablet 3   • docusate sodium (COLACE) 100 mg capsule Take 1 capsule (100 mg total) by mouth 2 (two) times a day (Patient not taking: Reported on 4/22/2024) 120 capsule 1   • Flaxseed, Linseed, (Flax Seed Oil) 1000 MG CAPS Take by mouth (Patient not taking: Reported on 3/27/2023)     • polyethylene glycol (GOLYTELY) 4000 mL solution Take 4,000 mL by mouth once for 1 dose 4000 mL 0     No current facility-administered medications for this visit.     Allergies   Allergen Reactions   • Azithromycin    • Niacin And Related    • Keflex [Cephalexin] Rash      Immunizations:     Immunization History   Administered Date(s) Administered   • COVID-19 MODERNA VACC 0.25 ML IM BOOSTER 11/12/2021   • COVID-19 MODERNA VACC 0.5 ML IM 02/12/2021, 03/12/2021   • Influenza Split High Dose Preservative Free IM 10/09/2012, 11/03/2014, 10/11/2016, 10/13/2017   • Influenza, high dose seasonal 0.7 mL 10/15/2018,  09/16/2019, 12/03/2020, 09/27/2022   • Influenza, seasonal, injectable 10/18/2006, 10/10/2007, 10/31/2008, 10/12/2010, 10/10/2011, 12/27/2013   • Pneumococcal Conjugate 13-Valent 06/29/2015   • Pneumococcal Polysaccharide PPV23 06/24/2013   • Tdap 04/05/2007      Health Maintenance:     There are no preventive care reminders to display for this patient.      Topic Date Due   • COVID-19 Vaccine (4 - 2023-24 season) 09/01/2023      Medicare Screening Tests and Risk Assessments:     Monse is here for her Subsequent Wellness visit.     Health Risk Assessment:   Patient rates overall health as fair. Patient feels that their physical health rating is same. Patient is satisfied with their life. Eyesight was rated as slightly worse. Hearing was rated as same. Patient feels that their emotional and mental health rating is same. Patients states they are sometimes angry. Patient states they are sometimes unusually tired/fatigued. Pain experienced in the last 7 days has been none. Patient states that she has experienced no weight loss or gain in last 6 months.     Depression Screening:   PHQ-2 Score: 2      Fall Risk Screening:   In the past year, patient has experienced: no history of falling in past year      Urinary Incontinence Screening:   Patient has leaked urine accidently in the last six months.     Home Safety:  Patient has trouble with stairs inside or outside of their home. Patient has working smoke alarms and has working carbon monoxide detector. Home safety hazards include: none.     Nutrition:   Current diet is Regular.     Medications:   Patient is not currently taking any over-the-counter supplements. Patient is able to manage medications.     Activities of Daily Living (ADLs)/Instrumental Activities of Daily Living (IADLs):   Walk and transfer into and out of bed and chair?: Yes  Dress and groom yourself?: Yes    Bathe or shower yourself?: Yes    Feed yourself? Yes  Do your laundry/housekeeping?: Yes  Manage  "your money, pay your bills and track your expenses?: Yes  Make your own meals?: Yes    Do your own shopping?: Yes    Previous Hospitalizations:   Any hospitalizations or ED visits within the last 12 months?: No      Advance Care Planning:   Living will: Yes    Durable POA for healthcare: Yes    Advanced directive: Yes      Cognitive Screening:   Provider or family/friend/caregiver concerned regarding cognition?: No    PREVENTIVE SCREENINGS      Cardiovascular Screening:    General: Screening Not Indicated and History Lipid Disorder      Diabetes Screening:     General: Screening Current      Colorectal Cancer Screening:     General: Screening Not Indicated      Breast Cancer Screening:     General: Screening Not Indicated      Cervical Cancer Screening:    General: Screening Not Indicated      Osteoporosis Screening:    General: Screening Not Indicated and History Osteoporosis      Abdominal Aortic Aneurysm (AAA) Screening:        General: Screening Not Indicated      Lung Cancer Screening:     General: Screening Not Indicated      Hepatitis C Screening:    General: Screening Not Indicated    Screening, Brief Intervention, and Referral to Treatment (SBIRT)    Screening  Typical number of drinks in a day: 0  Typical number of drinks in a week: 0  Interpretation: Low risk drinking behavior.    Single Item Drug Screening:  How often have you used an illegal drug (including marijuana) or a prescription medication for non-medical reasons in the past year? never    Single Item Drug Screen Score: 0  Interpretation: Negative screen for possible drug use disorder    No results found.     Physical Exam:     /74 (BP Location: Left arm, Patient Position: Sitting, Cuff Size: Standard)   Pulse 78   Temp (!) 96.4 °F (35.8 °C)   Resp 18   Ht 5' 1\" (1.549 m)   Wt 51.7 kg (114 lb)   SpO2 92%   BMI 21.54 kg/m²     Physical Exam  Constitutional:       General: She is not in acute distress.     Appearance: She is not " ill-appearing.   Cardiovascular:      Rate and Rhythm: Normal rate and regular rhythm.   Pulmonary:      Effort: Pulmonary effort is normal. No respiratory distress.      Breath sounds: No wheezing, rhonchi or rales.   Musculoskeletal:      Right lower leg: No edema.      Left lower leg: No edema.   Neurological:      General: No focal deficit present.      Mental Status: She is alert and oriented to person, place, and time.   Psychiatric:         Mood and Affect: Mood normal.          Beckie García MD

## 2024-05-17 ENCOUNTER — TELEPHONE (OUTPATIENT)
Age: 89
End: 2024-05-17

## 2024-05-17 NOTE — TELEPHONE ENCOUNTER
Called patient and advised her to go for xray that was ordered on 5/7 and we will call her with results after. She will go for xray on Monday. Do you need to see patient sooner than November?

## 2024-05-17 NOTE — TELEPHONE ENCOUNTER
Patient called with friend Jessica asking if she is supposed to come in for a two week follow up. She is asking if she should be going for a chest xray as well. I did not see provider notes stating she needed to return until her November appointment. Please advise.

## 2024-05-20 NOTE — TELEPHONE ENCOUNTER
Patient wanted to know if she needed to fast for her x-ray, advised no instructions stating this, NFA

## 2024-05-21 ENCOUNTER — APPOINTMENT (OUTPATIENT)
Dept: RADIOLOGY | Facility: CLINIC | Age: 89
End: 2024-05-21
Payer: MEDICARE

## 2024-05-21 DIAGNOSIS — J18.9 PNEUMONIA DUE TO INFECTIOUS ORGANISM, UNSPECIFIED LATERALITY, UNSPECIFIED PART OF LUNG: ICD-10-CM

## 2024-05-21 PROCEDURE — 71046 X-RAY EXAM CHEST 2 VIEWS: CPT

## 2024-06-07 DIAGNOSIS — I10 BENIGN ESSENTIAL HYPERTENSION: ICD-10-CM

## 2024-06-07 RX ORDER — HYDROCHLOROTHIAZIDE 25 MG/1
TABLET ORAL
Qty: 90 TABLET | Refills: 1 | Status: SHIPPED | OUTPATIENT
Start: 2024-06-07

## 2024-06-11 ENCOUNTER — OFFICE VISIT (OUTPATIENT)
Dept: CARDIOLOGY CLINIC | Facility: CLINIC | Age: 89
End: 2024-06-11
Payer: MEDICARE

## 2024-06-11 VITALS
SYSTOLIC BLOOD PRESSURE: 92 MMHG | HEART RATE: 76 BPM | HEIGHT: 61 IN | OXYGEN SATURATION: 95 % | DIASTOLIC BLOOD PRESSURE: 76 MMHG | BODY MASS INDEX: 21.52 KG/M2 | WEIGHT: 114 LBS

## 2024-06-11 DIAGNOSIS — I10 BENIGN ESSENTIAL HYPERTENSION: ICD-10-CM

## 2024-06-11 DIAGNOSIS — I48.0 PAROXYSMAL ATRIAL FIBRILLATION (HCC): Primary | ICD-10-CM

## 2024-06-11 DIAGNOSIS — I50.32 CHRONIC DIASTOLIC CONGESTIVE HEART FAILURE (HCC): ICD-10-CM

## 2024-06-11 DIAGNOSIS — E78.49 OTHER HYPERLIPIDEMIA: ICD-10-CM

## 2024-06-11 PROCEDURE — 99214 OFFICE O/P EST MOD 30 MIN: CPT | Performed by: INTERNAL MEDICINE

## 2024-06-11 PROCEDURE — 93000 ELECTROCARDIOGRAM COMPLETE: CPT | Performed by: INTERNAL MEDICINE

## 2024-06-11 NOTE — PROGRESS NOTES
Cardiology Follow Up    Monse Murray  2/16/1933  [id number]      Interval History: Monse Murray is here for follow up of atrial fibrillation.  He feels occasional fluttering but denies any shortness of breath or chest pain.  She has had no recent bleeding.  Xarelto was previously discontinued due to recurrent lower GI bleeds.  She had recent episode of pneumonia.  Has not felt palpitations occurring more frequently since then but has noticed them over the past few months.  ECG done today shows atrial flutter with irregular rhythm.  Rate is 75 bpm.  She has no symptoms at this time.    Her last documented episode of atrial fibrillation occurred in January 2019 during admission for pneumonia.  Complicating pneumonia was atrial fibrillation and pleural effusion.  She converted to sinus rhythm spontaneously and was started on Xarelto afterwards.    Echocardiogram showed normal ejection fraction with diastolic dysfunction.         The following portions of the patient's history were reviewed and updated as appropriate: She  has a past medical history of A-fib (HCC), 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HCC), Community acquired pneumonia, Hyperlipidemia, Hypertension, Irregular heart beat, Kidney stone, Lung abnormality, and Macular degeneration.  She  has a past surgical history that includes Hysterectomy; Tumor removal (N/A, 2000); Colon surgery; Bowel resection; Cataract extraction, extracapsular w/ intraocular lens implant (Bilateral); Tracheal surgery; Appendectomy; Colonoscopy (N/A, 8/5/2016); Colectomy; Throat surgery; and Eye surgery.  Her family history includes Diabetes in her sister; Hypertension in her mother and sister; Stroke in her mother.  She  reports that she has never smoked. She has never used smokeless tobacco. She reports that she does not drink alcohol and does not use drugs.  Current Outpatient Medications   Medication Sig Dispense Refill    alendronate (FOSAMAX) 70 mg  "tablet TAKE ONE TABLET BY MOUTH ONCE A WEEK 12 tablet 0    ezetimibe-simvastatin (VYTORIN) 10-20 mg per tablet TAKE ONE TABLET BY MOUTH AT BEDTIME 90 tablet 3    hydroCHLOROthiazide 25 mg tablet TAKE ONE TABLET BY MOUTH EVERY DAY (GENERIC FOR HYDRODUIRIL) 90 tablet 1    metoprolol tartrate (LOPRESSOR) 25 mg tablet Take 1 tablet (25 mg total) by mouth every 12 (twelve) hours 180 tablet 3    docusate sodium (COLACE) 100 mg capsule Take 1 capsule (100 mg total) by mouth 2 (two) times a day (Patient not taking: Reported on 4/22/2024) 120 capsule 1    Flaxseed, Linseed, (Flax Seed Oil) 1000 MG CAPS Take by mouth (Patient not taking: Reported on 3/27/2023)      polyethylene glycol (GOLYTELY) 4000 mL solution Take 4,000 mL by mouth once for 1 dose 4000 mL 0     No current facility-administered medications for this visit.     She is allergic to azithromycin, niacin and related, and keflex [cephalexin]..      Review of Systems:  Review of Systems   Respiratory:  Positive for shortness of breath. Negative for chest tightness.    Cardiovascular:  Positive for palpitations. Negative for chest pain and leg swelling.   Musculoskeletal:  Positive for arthralgias and myalgias.   All other systems reviewed and are negative.      Physical Exam:  BP 92/76 (BP Location: Left arm, Patient Position: Sitting, Cuff Size: Standard)   Pulse 76   Ht 5' 1\" (1.549 m)   Wt 51.7 kg (114 lb)   SpO2 95%   BMI 21.54 kg/m²     Physical Exam  Constitutional:       General: She is not in acute distress.     Appearance: Normal appearance. She is well-developed. She is not diaphoretic.   HENT:      Head: Normocephalic and atraumatic.   Eyes:      General: No scleral icterus.     Conjunctiva/sclera: Conjunctivae normal.      Pupils: Pupils are equal, round, and reactive to light.   Neck:      Thyroid: No thyromegaly.      Vascular: No JVD.   Cardiovascular:      Rate and Rhythm: Normal rate. Rhythm irregular.      Heart sounds: Normal heart sounds. " No murmur heard.     No friction rub. No gallop.   Pulmonary:      Effort: Pulmonary effort is normal.      Breath sounds: Normal breath sounds.   Musculoskeletal:      Cervical back: Neck supple.      Right lower leg: No edema.      Left lower leg: No edema.   Skin:     General: Skin is warm and dry.      Findings: No erythema or rash.   Neurological:      General: No focal deficit present.      Mental Status: She is alert and oriented to person, place, and time. Mental status is at baseline.   Psychiatric:         Mood and Affect: Mood normal.         Behavior: Behavior normal.         Thought Content: Thought content normal.         Judgment: Judgment normal.         Cardiographics  ECG: Atrial flutter with rate 75 beats minute    Labs:  Lab Results   Component Value Date     12/27/2013    K 3.9 10/25/2023    K 3.9 12/27/2013    CL 97 10/25/2023     12/27/2013    CO2 33 (H) 10/25/2023    CO2 33 12/27/2013    BUN 15 10/25/2023    BUN 15 12/27/2013    CREATININE 0.48 (L) 10/25/2023    CREATININE 0.7 12/27/2013    CALCIUM 8.8 10/25/2023    CALCIUM 8.9 12/27/2013     Lab Results   Component Value Date    WBC 6.56 10/25/2023    HGB 15.5 (H) 10/25/2023    HCT 46.6 (H) 10/25/2023    MCV 97 10/25/2023     10/25/2023     Lab Results   Component Value Date    CHOL 128 12/27/2013    TRIG 95 10/25/2023    TRIG 155 12/27/2013    HDL 42 (L) 10/25/2023    HDL 39 12/27/2013      Discussion/Summary:  1. Paroxysmal atrial fibrillation (HCC)    2. Other hyperlipidemia    3. Chronic diastolic congestive heart failure (HCC)    4. Benign essential hypertension      - Patient currently in atrial flutter with normal rate.  She is overall asymptomatic with only intermittent palpitations.  She is not on anticoagulation due to recurrent GI bleeds.  Discussed risk and benefits with her in detail.  Will use Eliquis 2.5 mg twice daily.  She developed GI bleed with Xarelto.  - cough stopped with discontinuation of lisinopril    -  continue metoprolol 25 mg b.i.d. And hydrochlorothiazide 25 mg daily.  Blood pressure is at goal.  - continue Vytorin.  Last LDL cholesterol was at goal.

## 2024-06-27 ENCOUNTER — APPOINTMENT (OUTPATIENT)
Dept: RADIOLOGY | Facility: CLINIC | Age: 89
End: 2024-06-27
Payer: MEDICARE

## 2024-06-27 ENCOUNTER — OFFICE VISIT (OUTPATIENT)
Dept: FAMILY MEDICINE CLINIC | Facility: CLINIC | Age: 89
End: 2024-06-27
Payer: MEDICARE

## 2024-06-27 VITALS
TEMPERATURE: 98 F | RESPIRATION RATE: 16 BRPM | HEART RATE: 80 BPM | OXYGEN SATURATION: 92 % | DIASTOLIC BLOOD PRESSURE: 80 MMHG | SYSTOLIC BLOOD PRESSURE: 105 MMHG | BODY MASS INDEX: 21.92 KG/M2 | WEIGHT: 116 LBS

## 2024-06-27 DIAGNOSIS — I10 BENIGN ESSENTIAL HYPERTENSION: ICD-10-CM

## 2024-06-27 DIAGNOSIS — J90 PLEURAL EFFUSION ON RIGHT: Primary | ICD-10-CM

## 2024-06-27 DIAGNOSIS — J90 PLEURAL EFFUSION ON RIGHT: ICD-10-CM

## 2024-06-27 PROCEDURE — 99214 OFFICE O/P EST MOD 30 MIN: CPT | Performed by: FAMILY MEDICINE

## 2024-06-27 PROCEDURE — 71046 X-RAY EXAM CHEST 2 VIEWS: CPT

## 2024-06-27 PROCEDURE — G2211 COMPLEX E/M VISIT ADD ON: HCPCS | Performed by: FAMILY MEDICINE

## 2024-06-27 NOTE — PROGRESS NOTES
Chief Complaint   Patient presents with   • Hypertension   • Follow-up     Pleural effusion         Patient ID: Monse Murray is a 89 y.o. female.    HPI  Pt is seeing for f/u R pleural effusion -  was Tx for possible pneumonia 1 m ago -  needs f/u XR -  has HTN -  NP has been low at last several OV     The following portions of the patient's history were reviewed and updated as appropriate: allergies, current medications, past family history, past medical history, past social history, past surgical history and problem list.    Review of Systems   Constitutional: Negative.    Respiratory: Negative.     Cardiovascular: Negative.    Gastrointestinal: Negative.    Genitourinary: Negative.    Musculoskeletal: Negative.    Skin: Negative.    Neurological: Negative.        Current Outpatient Medications   Medication Sig Dispense Refill   • alendronate (FOSAMAX) 70 mg tablet TAKE ONE TABLET BY MOUTH ONCE A WEEK 12 tablet 0   • docusate sodium (COLACE) 100 mg capsule Take 1 capsule (100 mg total) by mouth 2 (two) times a day (Patient not taking: Reported on 4/22/2024) 120 capsule 1   • ezetimibe-simvastatin (VYTORIN) 10-20 mg per tablet TAKE ONE TABLET BY MOUTH AT BEDTIME 90 tablet 3   • Flaxseed, Linseed, (Flax Seed Oil) 1000 MG CAPS Take by mouth (Patient not taking: Reported on 3/27/2023)     • hydroCHLOROthiazide 25 mg tablet TAKE ONE TABLET BY MOUTH EVERY DAY (GENERIC FOR HYDRODUIRIL) 90 tablet 1   • metoprolol tartrate (LOPRESSOR) 25 mg tablet Take 1 tablet (25 mg total) by mouth every 12 (twelve) hours 180 tablet 3   • polyethylene glycol (GOLYTELY) 4000 mL solution Take 4,000 mL by mouth once for 1 dose 4000 mL 0     No current facility-administered medications for this visit.       Objective:    /80   Pulse 80   Temp 98 °F (36.7 °C)   Resp 16   Wt 52.6 kg (116 lb)   SpO2 92% Comment: on RA  BMI 21.92 kg/m²        Physical Exam  Constitutional:       General: She is not in acute distress.     Appearance:  She is not ill-appearing.   Cardiovascular:      Rate and Rhythm: Normal rate and regular rhythm.      Heart sounds: No murmur heard.  Pulmonary:      Effort: Pulmonary effort is normal. No respiratory distress.      Breath sounds: No wheezing, rhonchi or rales.   Musculoskeletal:      Right lower leg: No edema.      Left lower leg: No edema.   Neurological:      General: No focal deficit present.      Mental Status: She is alert and oriented to person, place, and time.   Psychiatric:         Mood and Affect: Mood normal.                 Assessment/Plan:         Diagnoses and all orders for this visit:    Pleural effusion on right  -     XR chest pa & lateral; Future    Benign essential hypertension      Will stop HCTZ     Rto in 2 m for BP check                       Beckie García MD

## 2024-06-30 DIAGNOSIS — E78.5 HYPERLIPIDEMIA, UNSPECIFIED HYPERLIPIDEMIA TYPE: ICD-10-CM

## 2024-06-30 RX ORDER — EZETIMIBE AND SIMVASTATIN 10; 20 MG/1; MG/1
TABLET ORAL
Qty: 90 TABLET | Refills: 1 | Status: SHIPPED | OUTPATIENT
Start: 2024-06-30

## 2024-07-05 ENCOUNTER — TELEPHONE (OUTPATIENT)
Age: 89
End: 2024-07-05

## 2024-07-05 NOTE — TELEPHONE ENCOUNTER
Please update medication list Eliquis is not on medication list    Eliquis 2.5 bid    Please update medication list   OV 6/11 states will use    Please advise if wants on HCTZ not on medication list per pt that d/c but not sure per note. Looks like PCP d/c    Please advise

## 2024-07-11 ENCOUNTER — OFFICE VISIT (OUTPATIENT)
Dept: GASTROENTEROLOGY | Facility: CLINIC | Age: 89
End: 2024-07-11
Payer: MEDICARE

## 2024-07-11 VITALS
HEIGHT: 62 IN | HEART RATE: 86 BPM | WEIGHT: 120.4 LBS | DIASTOLIC BLOOD PRESSURE: 91 MMHG | BODY MASS INDEX: 22.16 KG/M2 | SYSTOLIC BLOOD PRESSURE: 141 MMHG

## 2024-07-11 DIAGNOSIS — K64.8 INTERNAL HEMORRHOIDS: Primary | ICD-10-CM

## 2024-07-11 DIAGNOSIS — Z85.038 HISTORY OF COLON CANCER IN ADULTHOOD: ICD-10-CM

## 2024-07-11 DIAGNOSIS — I48.91 ATRIAL FIBRILLATION WITH RAPID VENTRICULAR RESPONSE (HCC): ICD-10-CM

## 2024-07-11 PROCEDURE — G2211 COMPLEX E/M VISIT ADD ON: HCPCS | Performed by: INTERNAL MEDICINE

## 2024-07-11 PROCEDURE — 99213 OFFICE O/P EST LOW 20 MIN: CPT | Performed by: INTERNAL MEDICINE

## 2024-07-11 NOTE — TELEPHONE ENCOUNTER
Patient Monse [phone] contacting office requesting 90 day Eliquis and Metoprolol medication refill to be sent to [facility] in [facility].    Patient states she only has 2 days left on the metoprolol and 4 pills left on her Eliquis.     Patient is extremely confused .....    Patient had o/v 6/11/2024 W/Dr. Chaudhry and is scheduled for f/u on 9/5/2024.

## 2024-07-11 NOTE — TELEPHONE ENCOUNTER
Spoke with pt.  Reviewed her medications that she is taking. Pt's medication list is up to date.  Pt concerned about running out of Eliquis.  Advised pt that Dr. Chaudhry has sent Eliquis to pharmacy, and [facility] confirmed they received it so she should be able to pick prescription up either today or tomorrow when it becomes available.  Pt verbalized understanding.

## 2024-07-11 NOTE — TELEPHONE ENCOUNTER
Patient Monse [phone] contacting office extremely confused regarding medications she is supposed to continue taking.     Can clinical staff please reach out to patient for clarification.    Patient lives alone.

## 2024-07-11 NOTE — TELEPHONE ENCOUNTER
Caller: Monse Murray    Doctor: Dr. Chaudhry    Call back #: [phone]    Reason for call: Patient wanted an update regarding prescription refill per Sam Page. Patient wanted to relay the message that she has an appointment at 3 PM and will not be back to her phone until 6 PM. She asked that if prescription is ready at the time she is gone and she does not  the phone, to call her preferably again after 6 PM. Thank you!

## 2024-07-11 NOTE — PROGRESS NOTES
[facility] - Outpatient Follow-up Note  Monse Murray 89 y.o. female MRN: [id number]  Encounter: [id number]          ASSESSMENT AND PLAN:      1. Internal hemorrhoids  Has occasional scant bleeding which is not overly bothersome.  Discussed OTC fiber supplementation with Metamucil daily.  Will follow-up in several months    2. History of colon cancer in adulthood  Distant history of cancer status post curative surgery with no evidence of recurrence.  Colonoscopy in 2021 and 2023 both incomplete due to poor prep and severe luminal narrowing.  Risk of continued attempts at surveillance likely greater than any possible benefit so do not recommend further colonoscopy    ______________________________________________________________________    SUBJECTIVE: Patient with history of colon cancer status post recurrence since that time and has undergone routine surveillance colonoscopy.  Most recent colonoscopy in May 2023 was incomplete due to severe luminal narrowing in the sigmoid colon from diverticular disease      REVIEW OF SYSTEMS:    ROS       Historical Information   Past Medical History:   Diagnosis Date    A-fib (HCC)     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HCC)     Colon cancer (HCC)     Community acquired pneumonia     last assessed: 10/11/16    Hyperlipidemia     Hypertension     Irregular heart beat     Kidney stone     Lung abnormality     Macular degeneration      Past Surgical History:   Procedure Laterality Date    APPENDECTOMY      BOWEL RESECTION      CATARACT EXTRACTION EXTRACAPSULAR W/ INTRAOCULAR LENS IMPLANTATION Bilateral     COLECTOMY      Partial    COLON SURGERY      colon cancer    COLONOSCOPY N/A 8/5/2016    Procedure: COLONOSCOPY;  Surgeon: Tushar Fritz MD;  Location: [facility];  Service:     EYE SURGERY      HYSTERECTOMY      THROAT SURGERY      TRACHEAL SURGERY      tumor removed benign    TUMOR REMOVAL N/A 2000    trachea     Social History   Social History     Substance  "and Sexual Activity   Alcohol Use No     Social History     Substance and Sexual Activity   Drug Use No     Social History     Tobacco Use   Smoking Status Never   Smokeless Tobacco Never     Family History   Problem Relation Age of Onset    Diabetes Sister     Hypertension Sister     Stroke Mother     Hypertension Mother        Meds/Allergies       Current Outpatient Medications:     alendronate (FOSAMAX) 70 mg tablet    apixaban (Eliquis) 2.5 mg    ezetimibe-simvastatin (VYTORIN) 10-20 mg per tablet    metoprolol tartrate (LOPRESSOR) 25 mg tablet    Allergies   Allergen Reactions    Azithromycin     Niacin And Related     Keflex [Cephalexin] Rash           Objective     Blood pressure 141/91, pulse 86, height 5' 2\" (1.575 m), weight 54.6 kg (120 lb 6.4 oz). Body mass index is 22.02 kg/m².      PHYSICAL EXAM:      Physical Exam     Lab Results:   No visits with results within 1 Day(s) from this visit.   Latest known visit with results is:   Office Visit on 10/18/2023   Component Date Value    Sodium 10/25/2023 137     Potassium 10/25/2023 3.9     Chloride 10/25/2023 97     CO2 10/25/2023 33 (H)     ANION GAP 10/25/2023 7     BUN 10/25/2023 15     Creatinine 10/25/2023 0.48 (L)     Glucose, Fasting 10/25/2023 101 (H)     Calcium 10/25/2023 8.8     eGFR 10/25/2023 86     WBC 10/25/2023 6.56     RBC 10/25/2023 4.83     Hemoglobin 10/25/2023 15.5 (H)     Hematocrit 10/25/2023 46.6 (H)     MCV 10/25/2023 97     MCH 10/25/2023 32.1     MCHC 10/25/2023 33.3     RDW 10/25/2023 13.2     Platelets 10/25/2023 333     MPV 10/25/2023 9.4     Cholesterol 10/25/2023 142     Triglycerides 10/25/2023 95     HDL, Direct 10/25/2023 42 (L)     LDL Calculated 10/25/2023 81     Non-HDL-Chol (CHOL-HDL) 10/25/2023 100          Radiology Results:   XR chest pa & lateral    Result Date: 7/4/2024  Narrative: XR CHEST PA & LATERAL DUAL ENERGY SUBTRACTION. INDICATION:  J90: Pleural effusion, not elsewhere classified. History of colon cancer. " COMPARISON: CXR 5/21/2024, chest CT 12/02/2019, abdomen CT 4/13/2021. CT colonoscopy 5/22/2023. FINDINGS: Increased opacity in the lateral right base. Probable small loculated right pleural effusion. No pneumothorax. Normal heart size. Calcified hilar and mediastinal nodes indicating benign granulomatous disease. Bones are unremarkable for age. Unremarkable upper abdomen.     Impression: Increased opacity in the lateral right base, question pneumonia. Consider further evaluation with a chest CT with no contrast to evaluate for metastatic disease if clinically indicated given history of colon cancer. Probable small loculated right pleural effusion. This study was marked for significant notification and follow up. Workstation performed: [id number]

## 2024-07-12 ENCOUNTER — TELEPHONE (OUTPATIENT)
Age: 89
End: 2024-07-12

## 2024-07-12 NOTE — TELEPHONE ENCOUNTER
Patients GI provider:  Dr. Shukla    Number to return call:  [phone]    Reason for call: Pt calling to speak to some one in Office for the Metamucil dosage she was prescribed yesterday by Dr. Shukla Please call pt to advise further details.    Scheduled procedure/appointment date if applicable: None

## 2024-07-15 ENCOUNTER — TELEPHONE (OUTPATIENT)
Age: 89
End: 2024-07-15

## 2024-07-15 NOTE — TELEPHONE ENCOUNTER
Caller: Monse Murray       Reason for call:  Patient calling to speak to some one in Office for the Metoprolol dosage she was prescribed yesterday by Dr. Shukla.  Patient is confused on if its the right dose and to take a whole or half.  Please call patient back     Call back#: [phone]

## 2024-07-15 NOTE — TELEPHONE ENCOUNTER
Spoke with patient regarding Metoprolol and Eliquis, Vytorin, and Fosamax and when to take them time wise, how far apart.    Explained to take one of each in the AM and one of each in the PM for the Eliquis and Metoprolol; Vytorin at night; she wrote it all down.    Advised for the Fosamax needs 30mins between it and other meds.     Patient extremely forgetful, has called multiple times to clarify medications; may benefit from at home assistance.

## 2024-07-17 NOTE — TELEPHONE ENCOUNTER
Spoke with patient again regarding when to take her medications. Reviewed when to take Metoprolol (2x/day), Eliquis (2x/day), Vytorin (bedtime).    Patient is very forgetful, has poor vision, uses magnifying glass to read things.

## 2024-07-24 ENCOUNTER — HOSPITAL ENCOUNTER (INPATIENT)
Facility: HOSPITAL | Age: 89
LOS: 4 days | Discharge: NURSING, REHAB OR LONG-TERM CARE | DRG: 291 | End: 2024-07-28
Attending: EMERGENCY MEDICINE | Admitting: FAMILY MEDICINE
Payer: MEDICARE

## 2024-07-24 ENCOUNTER — APPOINTMENT (EMERGENCY)
Dept: RADIOLOGY | Facility: HOSPITAL | Age: 89
DRG: 291 | End: 2024-07-24
Payer: MEDICARE

## 2024-07-24 ENCOUNTER — TELEPHONE (OUTPATIENT)
Age: 89
End: 2024-07-24

## 2024-07-24 DIAGNOSIS — J96.01 ACUTE HYPOXIC RESPIRATORY FAILURE (HCC): Primary | ICD-10-CM

## 2024-07-24 DIAGNOSIS — J90 PLEURAL EFFUSION, RIGHT: ICD-10-CM

## 2024-07-24 DIAGNOSIS — I50.33 ACUTE ON CHRONIC DIASTOLIC CONGESTIVE HEART FAILURE (HCC): ICD-10-CM

## 2024-07-24 DIAGNOSIS — I50.32 CHRONIC DIASTOLIC CONGESTIVE HEART FAILURE (HCC): ICD-10-CM

## 2024-07-24 DIAGNOSIS — R41.0 CONFUSION: ICD-10-CM

## 2024-07-24 PROBLEM — E87.1 HYPONATREMIA: Status: ACTIVE | Noted: 2024-07-24

## 2024-07-24 PROBLEM — I48.91 A-FIB (HCC): Status: ACTIVE | Noted: 2019-01-23

## 2024-07-24 LAB
2HR DELTA HS TROPONIN: -1 NG/L
ALBUMIN SERPL BCG-MCNC: 3.6 G/DL (ref 3.5–5)
ALP SERPL-CCNC: 58 U/L (ref 34–104)
ALT SERPL W P-5'-P-CCNC: 20 U/L (ref 7–52)
ANION GAP SERPL CALCULATED.3IONS-SCNC: 3 MMOL/L (ref 4–13)
AST SERPL W P-5'-P-CCNC: 30 U/L (ref 13–39)
BACTERIA UR QL AUTO: ABNORMAL /HPF
BASOPHILS # BLD AUTO: 0.02 THOUSANDS/ÂΜL (ref 0–0.1)
BASOPHILS NFR BLD AUTO: 0 % (ref 0–1)
BILIRUB SERPL-MCNC: 1.41 MG/DL (ref 0.2–1)
BILIRUB UR QL STRIP: NEGATIVE
BNP SERPL-MCNC: 299 PG/ML (ref 0–100)
BUN SERPL-MCNC: 14 MG/DL (ref 5–25)
CALCIUM SERPL-MCNC: 8.7 MG/DL (ref 8.4–10.2)
CARDIAC TROPONIN I PNL SERPL HS: 3 NG/L
CARDIAC TROPONIN I PNL SERPL HS: 4 NG/L
CHLORIDE SERPL-SCNC: 98 MMOL/L (ref 96–108)
CLARITY UR: CLEAR
CO2 SERPL-SCNC: 31 MMOL/L (ref 21–32)
COLOR UR: YELLOW
CREAT SERPL-MCNC: 0.53 MG/DL (ref 0.6–1.3)
EOSINOPHIL # BLD AUTO: 0.02 THOUSAND/ÂΜL (ref 0–0.61)
EOSINOPHIL NFR BLD AUTO: 0 % (ref 0–6)
ERYTHROCYTE [DISTWIDTH] IN BLOOD BY AUTOMATED COUNT: 14.2 % (ref 11.6–15.1)
FLUAV RNA RESP QL NAA+PROBE: NEGATIVE
FLUBV RNA RESP QL NAA+PROBE: NEGATIVE
GFR SERPL CREATININE-BSD FRML MDRD: 83 ML/MIN/1.73SQ M
GLUCOSE SERPL-MCNC: 114 MG/DL (ref 65–140)
GLUCOSE UR STRIP-MCNC: NEGATIVE MG/DL
HCT VFR BLD AUTO: 49.6 % (ref 34.8–46.1)
HGB BLD-MCNC: 15.9 G/DL (ref 11.5–15.4)
HGB UR QL STRIP.AUTO: ABNORMAL
IMM GRANULOCYTES # BLD AUTO: 0.05 THOUSAND/UL (ref 0–0.2)
IMM GRANULOCYTES NFR BLD AUTO: 1 % (ref 0–2)
KETONES UR STRIP-MCNC: NEGATIVE MG/DL
LEUKOCYTE ESTERASE UR QL STRIP: NEGATIVE
LYMPHOCYTES # BLD AUTO: 0.47 THOUSANDS/ÂΜL (ref 0.6–4.47)
LYMPHOCYTES NFR BLD AUTO: 5 % (ref 14–44)
MCH RBC QN AUTO: 31.3 PG (ref 26.8–34.3)
MCHC RBC AUTO-ENTMCNC: 32.1 G/DL (ref 31.4–37.4)
MCV RBC AUTO: 98 FL (ref 82–98)
MONOCYTES # BLD AUTO: 0.77 THOUSAND/ÂΜL (ref 0.17–1.22)
MONOCYTES NFR BLD AUTO: 9 % (ref 4–12)
MUCOUS THREADS UR QL AUTO: ABNORMAL
NEUTROPHILS # BLD AUTO: 7.5 THOUSANDS/ÂΜL (ref 1.85–7.62)
NEUTS SEG NFR BLD AUTO: 85 % (ref 43–75)
NITRITE UR QL STRIP: NEGATIVE
NON-SQ EPI CELLS URNS QL MICRO: ABNORMAL /HPF
NRBC BLD AUTO-RTO: 0 /100 WBCS
PH UR STRIP.AUTO: 6 [PH]
PLATELET # BLD AUTO: 291 THOUSANDS/UL (ref 149–390)
PMV BLD AUTO: 9.3 FL (ref 8.9–12.7)
POTASSIUM SERPL-SCNC: 4.5 MMOL/L (ref 3.5–5.3)
PROT SERPL-MCNC: 7.2 G/DL (ref 6.4–8.4)
PROT UR STRIP-MCNC: ABNORMAL MG/DL
RBC # BLD AUTO: 5.08 MILLION/UL (ref 3.81–5.12)
RBC #/AREA URNS AUTO: ABNORMAL /HPF
RSV RNA RESP QL NAA+PROBE: NEGATIVE
SARS-COV-2 RNA RESP QL NAA+PROBE: NEGATIVE
SODIUM SERPL-SCNC: 132 MMOL/L (ref 135–147)
SP GR UR STRIP.AUTO: 1.02 (ref 1–1.03)
UROBILINOGEN UR STRIP-ACNC: 2 MG/DL
WBC # BLD AUTO: 8.83 THOUSAND/UL (ref 4.31–10.16)
WBC #/AREA URNS AUTO: ABNORMAL /HPF

## 2024-07-24 PROCEDURE — 71045 X-RAY EXAM CHEST 1 VIEW: CPT

## 2024-07-24 PROCEDURE — 96374 THER/PROPH/DIAG INJ IV PUSH: CPT

## 2024-07-24 PROCEDURE — 85025 COMPLETE CBC W/AUTO DIFF WBC: CPT | Performed by: EMERGENCY MEDICINE

## 2024-07-24 PROCEDURE — 99285 EMERGENCY DEPT VISIT HI MDM: CPT | Performed by: EMERGENCY MEDICINE

## 2024-07-24 PROCEDURE — 0241U HB NFCT DS VIR RESP RNA 4 TRGT: CPT | Performed by: NURSE PRACTITIONER

## 2024-07-24 PROCEDURE — 87086 URINE CULTURE/COLONY COUNT: CPT | Performed by: EMERGENCY MEDICINE

## 2024-07-24 PROCEDURE — 36415 COLL VENOUS BLD VENIPUNCTURE: CPT | Performed by: EMERGENCY MEDICINE

## 2024-07-24 PROCEDURE — 80053 COMPREHEN METABOLIC PANEL: CPT | Performed by: EMERGENCY MEDICINE

## 2024-07-24 PROCEDURE — 93005 ELECTROCARDIOGRAM TRACING: CPT

## 2024-07-24 PROCEDURE — 84484 ASSAY OF TROPONIN QUANT: CPT | Performed by: EMERGENCY MEDICINE

## 2024-07-24 PROCEDURE — 83880 ASSAY OF NATRIURETIC PEPTIDE: CPT | Performed by: EMERGENCY MEDICINE

## 2024-07-24 PROCEDURE — 81001 URINALYSIS AUTO W/SCOPE: CPT

## 2024-07-24 PROCEDURE — 99285 EMERGENCY DEPT VISIT HI MDM: CPT

## 2024-07-24 PROCEDURE — 99222 1ST HOSP IP/OBS MODERATE 55: CPT | Performed by: NURSE PRACTITIONER

## 2024-07-24 PROCEDURE — 70450 CT HEAD/BRAIN W/O DYE: CPT

## 2024-07-24 RX ORDER — CEFEPIME HYDROCHLORIDE 2 G/50ML
2000 INJECTION, SOLUTION INTRAVENOUS ONCE
Status: COMPLETED | OUTPATIENT
Start: 2024-07-24 | End: 2024-07-24

## 2024-07-24 RX ORDER — PRAVASTATIN SODIUM 40 MG
40 TABLET ORAL
Status: DISCONTINUED | OUTPATIENT
Start: 2024-07-24 | End: 2024-07-28 | Stop reason: HOSPADM

## 2024-07-24 RX ORDER — FUROSEMIDE 10 MG/ML
40 INJECTION INTRAMUSCULAR; INTRAVENOUS ONCE
Status: COMPLETED | OUTPATIENT
Start: 2024-07-24 | End: 2024-07-24

## 2024-07-24 RX ORDER — EZETIMIBE 10 MG/1
10 TABLET ORAL
Status: DISCONTINUED | OUTPATIENT
Start: 2024-07-25 | End: 2024-07-24

## 2024-07-24 RX ORDER — PRAVASTATIN SODIUM 40 MG
40 TABLET ORAL
Status: DISCONTINUED | OUTPATIENT
Start: 2024-07-25 | End: 2024-07-24

## 2024-07-24 RX ORDER — ACETAMINOPHEN 325 MG/1
650 TABLET ORAL EVERY 6 HOURS PRN
Status: DISCONTINUED | OUTPATIENT
Start: 2024-07-24 | End: 2024-07-28 | Stop reason: HOSPADM

## 2024-07-24 RX ORDER — EZETIMIBE 10 MG/1
10 TABLET ORAL
Status: DISCONTINUED | OUTPATIENT
Start: 2024-07-24 | End: 2024-07-28 | Stop reason: HOSPADM

## 2024-07-24 RX ORDER — FUROSEMIDE 10 MG/ML
40 INJECTION INTRAMUSCULAR; INTRAVENOUS DAILY
Status: DISCONTINUED | OUTPATIENT
Start: 2024-07-25 | End: 2024-07-26

## 2024-07-24 RX ADMIN — METOPROLOL TARTRATE 25 MG: 25 TABLET, FILM COATED ORAL at 21:31

## 2024-07-24 RX ADMIN — CEFEPIME HYDROCHLORIDE 2000 MG: 2 INJECTION, SOLUTION INTRAVENOUS at 19:48

## 2024-07-24 RX ADMIN — APIXABAN 2.5 MG: 2.5 TABLET, FILM COATED ORAL at 21:30

## 2024-07-24 RX ADMIN — PRAVASTATIN SODIUM 40 MG: 40 TABLET ORAL at 21:31

## 2024-07-24 RX ADMIN — EZETIMIBE 10 MG: 10 TABLET ORAL at 21:30

## 2024-07-24 RX ADMIN — FUROSEMIDE 40 MG: 10 INJECTION, SOLUTION INTRAMUSCULAR; INTRAVENOUS at 18:57

## 2024-07-24 NOTE — ED PROVIDER NOTES
History  Chief Complaint   Patient presents with    Altered Mental Status     Son brought patient in after concerns for cognitive decline over last week. Unsure if UTI or dehydration. Pt does live alone.     HPI  Patient is a 89-year-old female history of atrial fibrillation anticoagulated on Eliquis, hypertension, macular degeneration presenting for confusion.  Patient's son lives out of town, states that he has been calling her and has noted over the course of the past few weeks some mild confusion worse in the evenings and patient asking how to do things that she previously knew how to do.  Patient seems much more confused this past week prompting family to fly into town to check up on her.  Patient apparently has been eating and drinking less, inconsistently taking home medications.  Patient fully oriented at time of evaluation, denies headache, nausea, vomiting, fevers, chills, does complain of some chest pain and shortness of breath.  Patient states that the shortness of breath is only on exertion and states that the chest pain is nonexertional and typically lasts a few seconds and resolves.  Chest pain is not present in the emergency department.  Patient denies cough, recent travel or sick contacts.  Patient denies urinary symptoms.  Prior to Admission Medications   Prescriptions Last Dose Informant Patient Reported? Taking?   alendronate (FOSAMAX) 70 mg tablet Not Taking Self No No   Sig: TAKE ONE TABLET BY MOUTH ONCE A WEEK   Patient not taking: Reported on 7/24/2024   apixaban (Eliquis) 2.5 mg 7/24/2024 Self No Yes   Sig: Take 1 tablet (2.5 mg total) by mouth 2 (two) times a day   ezetimibe-simvastatin (VYTORIN) 10-20 mg per tablet 7/23/2024 at 2000 Self No Yes   Sig: TAKE ONE TABLET BY MOUTH AT BEDTIME   metoprolol tartrate (LOPRESSOR) 25 mg tablet 7/24/2024 Self No Yes   Sig: Take 1 tablet (25 mg total) by mouth every 12 (twelve) hours      Facility-Administered Medications: None       Past Medical  History:   Diagnosis Date    A-fib (HCC)     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HCC)     Colon cancer (HCC)     Community acquired pneumonia     last assessed: 10/11/16    Hyperlipidemia     Hypertension     Irregular heart beat     Kidney stone     Lung abnormality     Macular degeneration        Past Surgical History:   Procedure Laterality Date    APPENDECTOMY      BOWEL RESECTION      CATARACT EXTRACTION EXTRACAPSULAR W/ INTRAOCULAR LENS IMPLANTATION Bilateral     COLECTOMY      Partial    COLON SURGERY      colon cancer    COLONOSCOPY N/A 8/5/2016    Procedure: COLONOSCOPY;  Surgeon: Tushar Fritz MD;  Location: [facility];  Service:     EYE SURGERY      HYSTERECTOMY      THROAT SURGERY      TRACHEAL SURGERY      tumor removed benign    TUMOR REMOVAL N/A 2000    trachea       Family History   Problem Relation Age of Onset    Diabetes Sister     Hypertension Sister     Stroke Mother     Hypertension Mother      I have reviewed and agree with the history as documented.    E-Cigarette/Vaping    E-Cigarette Use Never User      E-Cigarette/Vaping Substances     Social History     Tobacco Use    Smoking status: Never    Smokeless tobacco: Never   Vaping Use    Vaping status: Never Used   Substance Use Topics    Alcohol use: No    Drug use: No       Review of Systems   Constitutional:  Negative for chills, fatigue and fever.   Respiratory:  Positive for shortness of breath. Negative for cough.    Cardiovascular:  Positive for chest pain.   Gastrointestinal:  Negative for diarrhea, nausea and vomiting.   Musculoskeletal:  Negative for arthralgias and myalgias.   Neurological:  Negative for weakness, numbness and headaches.   Psychiatric/Behavioral:  Negative for confusion.    All other systems reviewed and are negative.      Physical Exam  Physical Exam  Vitals and nursing note reviewed.   Constitutional:       General: She is not in acute distress.     Appearance: Normal appearance. She is not ill-appearing, toxic-appearing or  diaphoretic.      Comments: Well-appearing, nontoxic and nondistressed   HENT:      Head: Normocephalic and atraumatic.      Right Ear: External ear normal.      Left Ear: External ear normal.   Eyes:      General:         Right eye: No discharge.         Left eye: No discharge.   Pulmonary:      Effort: No respiratory distress.      Comments: Mildly increased work of breathing respiratory rate in the 20s.  Diminished breath sounds in the right lung base.  Satting in the mid 80s on room air.  Transition to 2 L nasal cannula and more comfortable.  Abdominal:      General: There is no distension.      Comments: Abdomen soft, nontender, nondistended without rigidity, rebound, guarding   Musculoskeletal:         General: No deformity.      Cervical back: Normal range of motion.      Comments: Trace bilateral lower extremity edema   Skin:     Findings: No lesion or rash.   Neurological:      Mental Status: She is alert and oriented to person, place, and time. Mental status is at baseline.      Comments: Awake, alert, pleasant, interactive   Psychiatric:         Mood and Affect: Mood and affect normal.         Vital Signs  ED Triage Vitals   Temperature Pulse Respirations Blood Pressure SpO2   07/24/24 1435 07/24/24 1434 07/24/24 1434 07/24/24 1434 07/24/24 1434   97.6 °F (36.4 °C) 79 18 135/80 94 %      Temp Source Heart Rate Source Patient Position - Orthostatic VS BP Location FiO2 (%)   07/24/24 1435 07/24/24 1434 07/24/24 1434 07/24/24 1434 --   Tympanic Monitor Sitting Right arm       Pain Score       07/24/24 1434       No Pain           Vitals:    07/24/24 1815 07/24/24 1845 07/24/24 1915 07/24/24 1945   BP: 170/98 143/70 141/83 135/65   Pulse: 84 84 88 82   Patient Position - Orthostatic VS:   Sitting          Visual Acuity      ED Medications  Medications   cefepime (MAXIPIME) IVPB (premix in dextrose) 2,000 mg 50 mL (2,000 mg Intravenous New Bag 7/24/24 1948)   furosemide (LASIX) injection 40 mg (40 mg  Intravenous Given 7/24/24 1857)       Diagnostic Studies  Results Reviewed       Procedure Component Value Units Date/Time    HS Troponin I 2hr [[id number]] Collected: 07/24/24 1947    Lab Status: No result Specimen: Blood from Arm, Right     HS Troponin I 4hr [[id number]]     Lab Status: No result Specimen: Blood     Urine culture [[id number]] Collected: 07/24/24 1721    Lab Status: In process Specimen: Urine, Clean Catch Updated: 07/24/24 1918    HS Troponin 0hr (reflex protocol) [[id number]]  (Normal) Collected: 07/24/24 1725    Lab Status: Final result Specimen: Blood from Arm, Left Updated: 07/24/24 1758     hs TnI 0hr 4 ng/L     B-Type Natriuretic Peptide(BNP) [[id number]]  (Abnormal) Collected: 07/24/24 1725    Lab Status: Final result Specimen: Blood from Arm, Left Updated: 07/24/24 1756      pg/mL     Urinalysis with microscopic [[id number]]  (Abnormal) Collected: 07/24/24 1721    Lab Status: Final result Specimen: Urine, Clean Catch Updated: 07/24/24 1741     Color, UA Yellow     Clarity, UA Clear     Specific Gravity, UA 1.020     pH, UA 6.0     Leukocytes, UA Negative     Nitrite, UA Negative     Protein, UA Trace mg/dl      Glucose, UA Negative mg/dl      Ketones, UA Negative mg/dl      Urobilinogen, UA 2.0 mg/dl      Bilirubin, UA Negative     Occult Blood, UA Trace     RBC, UA 2-4 /hpf      WBC, UA 1-2 /hpf      Epithelial Cells Occasional /hpf      Bacteria, UA None Seen /hpf      MUCUS THREADS Occasional    Urinalysis with microscopic [[id number]] Collected: 07/24/24 1716    Lab Status: No result Specimen: Urine, Clean Catch     Comprehensive metabolic panel [[id number]]  (Abnormal) Collected: 07/24/24 1623    Lab Status: Final result Specimen: Blood from Arm, Right Updated: 07/24/24 1652     Sodium 132 mmol/L      Potassium 4.5 mmol/L      Chloride 98 mmol/L      CO2 31 mmol/L      ANION GAP 3 mmol/L      BUN 14 mg/dL      Creatinine 0.53 mg/dL      Glucose 114 mg/dL      Calcium 8.7 mg/dL      AST  30 U/L      ALT 20 U/L      Alkaline Phosphatase 58 U/L      Total Protein 7.2 g/dL      Albumin 3.6 g/dL      Total Bilirubin 1.41 mg/dL      eGFR 83 ml/min/1.73sq m     Narrative:      National Kid[employer] Foundation guidelines for Chronic Kidney Disease (CKD):     Stage 1 with normal or high GFR (GFR > 90 mL/min/1.73 square meters)    Stage 2 Mild CKD (GFR = 60-89 mL/min/1.73 square meters)    Stage 3A Moderate CKD (GFR = 45-59 mL/min/1.73 square meters)    Stage 3B Moderate CKD (GFR = 30-44 mL/min/1.73 square meters)    Stage 4 Severe CKD (GFR = 15-29 mL/min/1.73 square meters)    Stage 5 End Stage CKD (GFR <15 mL/min/1.73 square meters)  Note: GFR calculation is accurate only with a steady state creatinine    CBC and differential [[id number]]  (Abnormal) Collected: 07/24/24 1623    Lab Status: Final result Specimen: Blood from Arm, Right Updated: 07/24/24 1633     WBC 8.83 Thousand/uL      RBC 5.08 Million/uL      Hemoglobin 15.9 g/dL      Hematocrit 49.6 %      MCV 98 fL      MCH 31.3 pg      MCHC 32.1 g/dL      RDW 14.2 %      MPV 9.3 fL      Platelets 291 Thousands/uL      nRBC 0 /100 WBCs      Segmented % 85 %      Immature Grans % 1 %      Lymphocytes % 5 %      Monocytes % 9 %      Eosinophils Relative 0 %      Basophils Relative 0 %      Absolute Neutrophils 7.50 Thousands/µL      Absolute Immature Grans 0.05 Thousand/uL      Absolute Lymphocytes 0.47 Thousands/µL      Absolute Monocytes 0.77 Thousand/µL      Eosinophils Absolute 0.02 Thousand/µL      Basophils Absolute 0.02 Thousands/µL                    CT head without contrast   Final Result by Jono Elder MD (07/24 1651)      No acute intracranial abnormality.                  Workstation performed: [id number]         XR chest 1 view portable    (Results Pending)              Procedures  Procedures         ED Course                                               Medical Decision Making  I obtained history from the patient.  I  viewed external medical documentation.  Patient was evaluated by primary care provider on 6/27/2024, noted concern for pleural effusion versus pneumonia.    Differential diagnosis includes was not limited to electrolyte or renal derangement, intracranial trauma, medication side effect, less likely atypical presentation of ACS.  I ordered and reviewed lab work including CBC, CMP, troponin, BNP.  Patient with a mildly elevated BNP, normal troponin. Patient without additional significant lab derangements.  I ordered and independently interpreted chest x-ray which demonstrates a moderate right-sided pleural effusion versus pneumonia.  I treated patient with Lasix, cefepime.  I ordered and reviewed a CT head which was unremarkable.  I ordered and independently interpreted an EKG which demonstrates rate controlled atrial fibrillation rate of 93 with a normal axis with occasional PVCs, no ST or T wave abnormalities.  I discussed patient with the hospitalist and admitted patient    Amount and/or Complexity of Data Reviewed  Labs: ordered.  Radiology: ordered.    Risk  Prescription drug management.  Decision regarding hospitalization.                 Disposition  Final diagnoses:   Acute hypoxic respiratory failure (HCC)   Confusion   Pleural effusion, right     Time reflects when diagnosis was documented in both MDM as applicable and the Disposition within this note       Time User Action Codes Description Comment    7/24/2024  7:32 PM Norberto Hernandez Add [J96.01] Acute hypoxic respiratory failure (HCC)     7/24/2024  7:33 PM Norberto Hernandez Add [R41.0] Confusion     7/24/2024  7:33 PM Norberto Hernandez Add [J90] Pleural effusion, right           ED Disposition       ED Disposition   Admit    Condition   Stable    Date/Time   Wed Jul 24, 2024  7:32 PM    Comment   Case was discussed with ELENA and the patient's admission status was agreed to be Admission Status: observation status to the service of Dr. Puri .                Follow-up Information    None         Patient's Medications   Discharge Prescriptions    No medications on file       No discharge procedures on file.    PDMP Review       None            ED Provider  Electronically Signed by             Norberto Hernandez MD  07/24/24 1956

## 2024-07-24 NOTE — TELEPHONE ENCOUNTER
Pt's son calling in stating [facility] in [facility] will be faxing over forms for Dr. Butts to review- they will also try to have them emailed and printed just in case the fax does not come through for this afternoon's visit, please keep an eye out

## 2024-07-24 NOTE — LETTER
[address] [address]  Dept: [phone]    July 31, 2024     Patient: Monse Murray   YOB: 1933   Date of Visit: 7/24/2024       To Whom it May Concern:    Monse Murray was under [facility] care. She was seen in the hospital from 7/24/2024 to 07/31/24. She was admitted to hospital for shortness of breath and treated for Heart failure and pleural effusions. She is limited in her activity especially exertion due to heart failure, advanced age and legal blindness.    If you have any questions or concerns, please don't hesitate to call.         Sincerely,          Vanesa Calvin MD

## 2024-07-25 ENCOUNTER — APPOINTMENT (INPATIENT)
Dept: NON INVASIVE DIAGNOSTICS | Facility: HOSPITAL | Age: 89
DRG: 291 | End: 2024-07-25
Payer: MEDICARE

## 2024-07-25 PROBLEM — E87.1 HYPONATREMIA: Status: RESOLVED | Noted: 2024-07-24 | Resolved: 2024-07-25

## 2024-07-25 LAB
4HR DELTA HS TROPONIN: -1 NG/L
ALBUMIN SERPL BCG-MCNC: 3 G/DL (ref 3.5–5)
ALP SERPL-CCNC: 45 U/L (ref 34–104)
ALT SERPL W P-5'-P-CCNC: 17 U/L (ref 7–52)
ANION GAP SERPL CALCULATED.3IONS-SCNC: 3 MMOL/L (ref 4–13)
AORTIC ROOT: 3.3 CM
AORTIC VALVE MEAN VELOCITY: 13 M/S
APPEARANCE FLD: NORMAL
AST SERPL W P-5'-P-CCNC: 24 U/L (ref 13–39)
ATRIAL RATE: 500 BPM
AV AREA BY CONTINUOUS VTI: 1.5 CM2
AV AREA PEAK VELOCITY: 1.5 CM2
AV LVOT MEAN GRADIENT: 2 MMHG
AV LVOT PEAK GRADIENT: 4 MMHG
AV MEAN GRADIENT: 7 MMHG
AV PEAK GRADIENT: 13 MMHG
AV VALVE AREA: 1.53 CM2
AV VELOCITY RATIO: 0.58
BACTERIA UR QL AUTO: ABNORMAL /HPF
BILIRUB SERPL-MCNC: 1.27 MG/DL (ref 0.2–1)
BILIRUB UR QL STRIP: NEGATIVE
BSA FOR ECHO PROCEDURE: 1.51 M2
BUN SERPL-MCNC: 13 MG/DL (ref 5–25)
CALCIUM ALBUM COR SERPL-MCNC: 8.8 MG/DL (ref 8.3–10.1)
CALCIUM SERPL-MCNC: 8 MG/DL (ref 8.4–10.2)
CARDIAC TROPONIN I PNL SERPL HS: 3 NG/L
CHLORIDE SERPL-SCNC: 98 MMOL/L (ref 96–108)
CLARITY UR: CLEAR
CO2 SERPL-SCNC: 36 MMOL/L (ref 21–32)
COLOR FLD: YELLOW
COLOR UR: ABNORMAL
CREAT SERPL-MCNC: 0.5 MG/DL (ref 0.6–1.3)
DOP CALC AO PEAK VEL: 1.78 M/S
DOP CALC AO VTI: 39.71 CM
DOP CALC LVOT AREA: 2.54 CM2
DOP CALC LVOT CARDIAC INDEX: 6.06 L/MIN/M2
DOP CALC LVOT CARDIAC OUTPUT: 9.15 L/MIN
DOP CALC LVOT DIAMETER: 1.8 CM
DOP CALC LVOT PEAK VEL VTI: 23.89 CM
DOP CALC LVOT PEAK VEL: 1.04 M/S
DOP CALC LVOT STROKE INDEX: 38.4 ML/M2
DOP CALC LVOT STROKE VOLUME: 60.76
E WAVE DECELERATION TIME: 202 MS
ERYTHROCYTE [DISTWIDTH] IN BLOOD BY AUTOMATED COUNT: 14.3 % (ref 11.6–15.1)
FRACTIONAL SHORTENING: 35 (ref 28–44)
GFR SERPL CREATININE-BSD FRML MDRD: 84 ML/MIN/1.73SQ M
GLUCOSE FLD-MCNC: 98 MG/DL
GLUCOSE SERPL-MCNC: 87 MG/DL (ref 65–140)
GLUCOSE SERPL-MCNC: 94 MG/DL (ref 65–140)
GLUCOSE UR STRIP-MCNC: NEGATIVE MG/DL
HCT VFR BLD AUTO: 46.2 % (ref 34.8–46.1)
HGB BLD-MCNC: 14.8 G/DL (ref 11.5–15.4)
HGB UR QL STRIP.AUTO: NEGATIVE
HISTIOCYTES NFR FLD: 19 %
INTERVENTRICULAR SEPTUM IN DIASTOLE (PARASTERNAL SHORT AXIS VIEW): 1 CM
INTERVENTRICULAR SEPTUM: 1 CM (ref 0.6–1.1)
KETONES UR STRIP-MCNC: NEGATIVE MG/DL
LAAS-AP2: 22.2 CM2
LAAS-AP4: 18.8 CM2
LDH FLD L TO P-CCNC: 77 U/L
LEFT ATRIUM SIZE: 3.9 CM
LEFT ATRIUM VOLUME (MOD BIPLANE): 61 ML
LEFT ATRIUM VOLUME INDEX (MOD BIPLANE): 42 ML/M2
LEFT INTERNAL DIMENSION IN SYSTOLE: 2.2 CM (ref 2.1–4)
LEFT VENTRICULAR INTERNAL DIMENSION IN DIASTOLE: 3.4 CM (ref 3.5–6)
LEFT VENTRICULAR POSTERIOR WALL IN END DIASTOLE: 1 CM
LEFT VENTRICULAR STROKE VOLUME: 30 ML
LEUKOCYTE ESTERASE UR QL STRIP: NEGATIVE
LVSV (TEICH): 30 ML
LYMPHOCYTES NFR BLD AUTO: 52 %
MAGNESIUM SERPL-MCNC: 2.1 MG/DL (ref 1.9–2.7)
MCH RBC QN AUTO: 31.6 PG (ref 26.8–34.3)
MCHC RBC AUTO-ENTMCNC: 32 G/DL (ref 31.4–37.4)
MCV RBC AUTO: 99 FL (ref 82–98)
MONO+MESO NFR FLD MANUAL: 13 %
MONOCYTES NFR BLD AUTO: 6 %
MV E'TISSUE VEL-LAT: 4 CM/S
MV E'TISSUE VEL-SEP: 4 CM/S
MV PEAK E VEL: 110 CM/S
MV STENOSIS PRESSURE HALF TIME: 59 MS
MV VALVE AREA P 1/2 METHOD: 3.73
NEUTS SEG NFR BLD AUTO: 10 %
NITRITE UR QL STRIP: NEGATIVE
NON-SQ EPI CELLS URNS QL MICRO: ABNORMAL /HPF
OTHER STN SPEC: ABNORMAL
PH BODY FLUID: 7.6
PH UR STRIP.AUTO: 6 [PH]
PLATELET # BLD AUTO: 252 THOUSANDS/UL (ref 149–390)
PMV BLD AUTO: 9.1 FL (ref 8.9–12.7)
POTASSIUM SERPL-SCNC: 3.8 MMOL/L (ref 3.5–5.3)
PROT FLD-MCNC: <3 G/DL
PROT SERPL-MCNC: 6 G/DL (ref 6.4–8.4)
PROT UR STRIP-MCNC: NEGATIVE MG/DL
QRS AXIS: 68 DEGREES
QRSD INTERVAL: 76 MS
QT INTERVAL: 330 MS
QTC INTERVAL: 410 MS
RBC # BLD AUTO: 4.69 MILLION/UL (ref 3.81–5.12)
RBC #/AREA URNS AUTO: ABNORMAL /HPF
RIGHT VENTRICLE ID DIMENSION: 3 CM
SITE: NORMAL
SL CV LEFT ATRIUM LENGTH A2C: 6.2 CM
SL CV LV EF: 64
SL CV PED ECHO LEFT VENTRICLE DIASTOLIC VOLUME (MOD BIPLANE) 2D: 47 ML
SL CV PED ECHO LEFT VENTRICLE SYSTOLIC VOLUME (MOD BIPLANE) 2D: 17 ML
SODIUM SERPL-SCNC: 137 MMOL/L (ref 135–147)
SP GR UR STRIP.AUTO: 1.01 (ref 1–1.03)
T WAVE AXIS: 83 DEGREES
TOTAL CELLS COUNTED SPEC: 100
TOTAL PROTEIN FLUID: 2.4 G/DL
TR MAX PG: 51 MMHG
TR PEAK VELOCITY: 3.6 M/S
TRICUSPID ANNULAR PLANE SYSTOLIC EXCURSION: 1.7 CM
TRICUSPID VALVE PEAK REGURGITATION VELOCITY: 3.56 M/S
TSH SERPL DL<=0.05 MIU/L-ACNC: 1.82 UIU/ML (ref 0.45–4.5)
UROBILINOGEN UR STRIP-ACNC: <2 MG/DL
VENTRICULAR RATE: 93 BPM
WBC # BLD AUTO: 7.05 THOUSAND/UL (ref 4.31–10.16)
WBC # FLD MANUAL: 1555 /UL
WBC #/AREA URNS AUTO: ABNORMAL /HPF

## 2024-07-25 PROCEDURE — 88305 TISSUE EXAM BY PATHOLOGIST: CPT | Performed by: STUDENT IN AN ORGANIZED HEALTH CARE EDUCATION/TRAINING PROGRAM

## 2024-07-25 PROCEDURE — 87205 SMEAR GRAM STAIN: CPT | Performed by: NURSE PRACTITIONER

## 2024-07-25 PROCEDURE — 82945 GLUCOSE OTHER FLUID: CPT | Performed by: NURSE PRACTITIONER

## 2024-07-25 PROCEDURE — NC001 PR NO CHARGE: Performed by: RADIOLOGY

## 2024-07-25 PROCEDURE — 83735 ASSAY OF MAGNESIUM: CPT | Performed by: NURSE PRACTITIONER

## 2024-07-25 PROCEDURE — 84443 ASSAY THYROID STIM HORMONE: CPT | Performed by: NURSE PRACTITIONER

## 2024-07-25 PROCEDURE — 81001 URINALYSIS AUTO W/SCOPE: CPT | Performed by: EMERGENCY MEDICINE

## 2024-07-25 PROCEDURE — 88112 CYTOPATH CELL ENHANCE TECH: CPT | Performed by: STUDENT IN AN ORGANIZED HEALTH CARE EDUCATION/TRAINING PROGRAM

## 2024-07-25 PROCEDURE — 82948 REAGENT STRIP/BLOOD GLUCOSE: CPT

## 2024-07-25 PROCEDURE — 0W993ZZ DRAINAGE OF RIGHT PLEURAL CAVITY, PERCUTANEOUS APPROACH: ICD-10-PCS | Performed by: FAMILY MEDICINE

## 2024-07-25 PROCEDURE — 84484 ASSAY OF TROPONIN QUANT: CPT | Performed by: NURSE PRACTITIONER

## 2024-07-25 PROCEDURE — 93010 ELECTROCARDIOGRAM REPORT: CPT | Performed by: INTERNAL MEDICINE

## 2024-07-25 PROCEDURE — 84157 ASSAY OF PROTEIN OTHER: CPT | Performed by: NURSE PRACTITIONER

## 2024-07-25 PROCEDURE — 83986 ASSAY PH BODY FLUID NOS: CPT | Performed by: NURSE PRACTITIONER

## 2024-07-25 PROCEDURE — 93306 TTE W/DOPPLER COMPLETE: CPT

## 2024-07-25 PROCEDURE — 80053 COMPREHEN METABOLIC PANEL: CPT | Performed by: NURSE PRACTITIONER

## 2024-07-25 PROCEDURE — 87070 CULTURE OTHR SPECIMN AEROBIC: CPT | Performed by: NURSE PRACTITIONER

## 2024-07-25 PROCEDURE — 99223 1ST HOSP IP/OBS HIGH 75: CPT | Performed by: INTERNAL MEDICINE

## 2024-07-25 PROCEDURE — 32555 ASPIRATE PLEURA W/ IMAGING: CPT | Performed by: RADIOLOGY

## 2024-07-25 PROCEDURE — 83615 LACTATE (LD) (LDH) ENZYME: CPT | Performed by: NURSE PRACTITIONER

## 2024-07-25 PROCEDURE — 99232 SBSQ HOSP IP/OBS MODERATE 35: CPT | Performed by: FAMILY MEDICINE

## 2024-07-25 PROCEDURE — 89051 BODY FLUID CELL COUNT: CPT | Performed by: NURSE PRACTITIONER

## 2024-07-25 PROCEDURE — 93306 TTE W/DOPPLER COMPLETE: CPT | Performed by: INTERNAL MEDICINE

## 2024-07-25 PROCEDURE — 85027 COMPLETE CBC AUTOMATED: CPT | Performed by: NURSE PRACTITIONER

## 2024-07-25 RX ORDER — LIDOCAINE WITH 8.4% SOD BICARB 0.9%(10ML)
SYRINGE (ML) INJECTION AS NEEDED
Status: COMPLETED | OUTPATIENT
Start: 2024-07-25 | End: 2024-07-25

## 2024-07-25 RX ADMIN — EZETIMIBE 10 MG: 10 TABLET ORAL at 21:38

## 2024-07-25 RX ADMIN — PRAVASTATIN SODIUM 40 MG: 40 TABLET ORAL at 21:42

## 2024-07-25 RX ADMIN — Medication 10 ML: at 08:24

## 2024-07-25 RX ADMIN — APIXABAN 2.5 MG: 2.5 TABLET, FILM COATED ORAL at 09:17

## 2024-07-25 RX ADMIN — FUROSEMIDE 40 MG: 10 INJECTION, SOLUTION INTRAMUSCULAR; INTRAVENOUS at 09:17

## 2024-07-25 RX ADMIN — METOPROLOL TARTRATE 25 MG: 25 TABLET, FILM COATED ORAL at 09:17

## 2024-07-25 RX ADMIN — METOPROLOL TARTRATE 25 MG: 25 TABLET, FILM COATED ORAL at 21:40

## 2024-07-25 RX ADMIN — APIXABAN 2.5 MG: 2.5 TABLET, FILM COATED ORAL at 17:14

## 2024-07-25 NOTE — PROGRESS NOTES
Formerly [facility], later [facility]  Progress Note  Name: Monse Murray I  MRN: [id number]  Unit/Bed#: 2 [address] Date of Admission: 7/24/2024   Date of Service: 7/25/2024 I Hospital Day: 1    Assessment & Plan   * Acute on chronic diastolic congestive heart failure (HCC)  Assessment & Plan  Patient presented with dyspnea on exertion with associated mild chest pain during SOB episodes.      Was on HCTZ 25 mg p.o. daily in the past.  Not currently on home med list.  2D echo in 2019 showed normal EF around 60%, elevated mean left atrial filling pressure, no significant valvular disease.    Given Lasix 40 mg IV in ED with good response.  Continue Lasix 40 mg IV daily.  2D echo pending  Cardiac diet with fluid restriction 1800 cc/day  Daily weight, intake output  Continue Lopressor.  Cardiology consulted          Pleural effusion on right  Assessment & Plan  Patient was treated with pneumonia in end of April and beginning of May with doxycycline for 7 days and Levaquin for 7 days respectively.  Chest x-ray in end of April showed lateral segment right middle lobe atelectasis and or infiltrate,small right effusion.   X-ray today as above  Status post right-sided thoracentesis with removal of 700 mL of marija pleural fluid by IR today  Follow-up fluid studies    Hypertension  Assessment & Plan  Continue metoprolol  BP well-controlled    History of colon cancer  Assessment & Plan  Status post treatment.    Paroxysmal atrial fibrillation (HCC)  Assessment & Plan  On Eliquis, metoprolol which is being continued  EKG controlled A-fib    Hyperlipidemia  Assessment & Plan  Continue Zetia, statin    Hyponatremia-resolved as of 7/25/2024  Assessment & Plan  Sodium 132, initially now resolved   Suspect due to volume overload  IV Lasix as above  Repeat lab in the morning               VTE Pharmacologic Prophylaxis:    eliquis    Mobility:   Basic Mobility Inpatient Raw Score: 22  -HL Goal: 7: Walk 25 feet or more  -HLM  Achieved: 4: Move to chair/commode  JH-HLM Goal NOT achieved. Continue with multidisciplinary rounding and encourage appropriate mobility to improve upon JH-HLM goals.    Patient Centered Rounds: I performed bedside rounds with nursing staff today.   Discussions with Specialists or Other Care Team Provider: yes - cardio    Education and Discussions with Family / Patient: Updated  (frankie and his wife) at bedside.    Total Time Spent on Date of Encounter in care of patient:  This time was spent on one or more of the following: performing physical exam; counseling and coordination of care; obtaining or reviewing history; documenting in the medical record; reviewing/ordering tests, medications or procedures; communicating with other healthcare professionals and discussing with patient's family/caregivers.    Current Length of Stay: 1 day(s)  Current Patient Status: Inpatient   Certification Statement: The patient will continue to require additional inpatient hospital stay due to pleural effusion, CHF  Discharge Plan: Anticipate discharge in 24-48 hrs to home.    Code Status: Level 3 - DNAR and DNI    Subjective:     Patient reports improvement in her breathing although she has not tried ambulating yet.  Denies any chest pain    Objective:     Vitals:   Temp (24hrs), Av.6 °F (36.4 °C), Min:97.3 °F (36.3 °C), Max:97.8 °F (36.6 °C)    Temp:  [97.3 °F (36.3 °C)-97.8 °F (36.6 °C)] 97.3 °F (36.3 °C)  HR:  [64-88] 67  Resp:  [16-25] 16  BP: (102-175)/(52-98) 111/63  SpO2:  [85 %-98 %] 98 %  Body mass index is 20.85 kg/m².     Input and Output Summary (last 24 hours):     Intake/Output Summary (Last 24 hours) at 2024 1350  Last data filed at 2024 1013  Gross per 24 hour   Intake 50 ml   Output 2175 ml   Net -2125 ml       Physical Exam:   Physical Exam  Constitutional:       General: She is not in acute distress.     Appearance: She is not toxic-appearing.   HENT:      Head: Normocephalic and  atraumatic.   Eyes:      General: No scleral icterus.  Cardiovascular:      Rate and Rhythm: Normal rate and regular rhythm.   Pulmonary:      Effort: Pulmonary effort is normal. No respiratory distress.      Breath sounds: No wheezing or rales.      Comments: Decreased breath sounds bilaterally  Abdominal:      General: Bowel sounds are normal. There is no distension.      Palpations: Abdomen is soft.      Tenderness: There is no abdominal tenderness.   Musculoskeletal:      Right lower leg: Edema present.      Left lower leg: Edema present.   Neurological:      Mental Status: She is alert.          Additional Data:     Labs:  Results from last 7 days   Lab Units 07/25/24  0609 07/24/24  1623   WBC Thousand/uL 7.05 8.83   HEMOGLOBIN g/dL 14.8 15.9*   HEMATOCRIT % 46.2* 49.6*   PLATELETS Thousands/uL 252 291   SEGS PCT %  --  85*   LYMPHO PCT %  --  5*   MONO PCT %  --  9   EOS PCT %  --  0     Results from last 7 days   Lab Units 07/25/24  0609   SODIUM mmol/L 137   POTASSIUM mmol/L 3.8   CHLORIDE mmol/L 98   CO2 mmol/L 36*   BUN mg/dL 13   CREATININE mg/dL 0.50*   ANION GAP mmol/L 3*   CALCIUM mg/dL 8.0*   ALBUMIN g/dL 3.0*   TOTAL BILIRUBIN mg/dL 1.27*   ALK PHOS U/L 45   ALT U/L 17   AST U/L 24   GLUCOSE RANDOM mg/dL 87         Results from last 7 days   Lab Units 07/25/24  0704   POC GLUCOSE mg/dl 94               Lines/Drains:  Invasive Devices       Peripheral Intravenous Line  Duration             Peripheral IV 07/24/24 Right Antecubital <1 day                      Imaging: Reviewed radiology reports from this admission including: chest xray and procedure reports    Recent Cultures (last 7 days):         Last 24 Hours Medication List:   Current Facility-Administered Medications   Medication Dose Route Frequency Provider Last Rate    acetaminophen  650 mg Oral Q6H PRN NILO Wilder      apixaban  2.5 mg Oral BID NILO Wilder      ezetimibe  10 mg Oral HS NILO Wilder      And    pravastatin   40 mg Oral HS NILO Wilder      furosemide  40 mg Intravenous Daily NILO Wilder      metoprolol tartrate  25 mg Oral Q12H SRINI NILO Wilder          Today, Patient Was Seen By: Patricia Rosario DO    **Please Note: This note may have been constructed using a voice recognition system.**

## 2024-07-25 NOTE — CONSULTS
Consultation - Cardiology   Monse Murray 89 y.o. female MRN: [id number]  Unit/Bed#: 2 [place] [address] Encounter: [id number]    Assessment & Plan     Assessment:  Acute on chronic CHF  Dyspnea on exertion  Paroxysmal atrial fibrillation  Hypertension  Hyperlipidemia  Plan:  Dyspnea with exertion on presentation, occasional mild chest pain. . EKG shows atrial fibrillation, anterior infarct of undetermined age. Echo in 2019 showed EF around 60%, elevated mean left atrial filling pressure, no significant valvular disease.   Continue lasix 40 mg IV  Continue lopressor 25 mg  Echo ordered  Cardiac diet with 1.8L fluid restriction  Daily weight and I/O monitoring    2. CXR showed mild pulmonary vascular congestion with moderate right and small left pleural effusion. S/p right-sided thoracentesis with removal of 700 ml of marija pleural fluid.    3. EKG shows atrial fibrillation, anterior infarct of undetermined age.  Continue eliquis 2.5 mg and metoprolol 25 mg    4. Continue metoprolol 25 mg    5. Continue zetia and statin    History of Present Illness   Physician Requesting Consult: Patricia Rosario DO  Reason for Consult / Principal Problem: Volume overload and CHF  HPI: Monse Murray is a 89 y.o. year old female with a past medical history of CHF, A-fib, hypertension, hyperlipidemia, colon cancer and macular degeneration who presents with SOB with exertion which has progressively worsened over the last 2 years and recent onset bilateral lower extremity edema. She reported occasional chest pain that is random and associated with deep breaths. She also had increased confusion and was prompted to visit the ED. Patient lives alone and uses a walker for ambulation.     Inpatient consult to Cardiology  Consult performed by: Nicki Lujan MD  Consult ordered by: NILO Wilder          Review of Systems   Constitutional: Negative.    HENT: Negative.     Eyes: Negative.    Respiratory:  Positive for shortness of breath.     Cardiovascular:  Positive for leg swelling.   Gastrointestinal: Negative.    Genitourinary: Negative.    Musculoskeletal: Negative.    Skin: Negative.    Neurological: Negative.    Psychiatric/Behavioral: Negative.         Historical Information   Past Medical History:   Diagnosis Date    A-fib (HCC)     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HCC)     Colon cancer (HCC)     Community acquired pneumonia     last assessed: 10/11/16    Hyperlipidemia     Hypertension     Irregular heart beat     Kidney stone     Lung abnormality     Macular degeneration      Past Surgical History:   Procedure Laterality Date    APPENDECTOMY      BOWEL RESECTION      CATARACT EXTRACTION EXTRACAPSULAR W/ INTRAOCULAR LENS IMPLANTATION Bilateral     COLECTOMY      Partial    COLON SURGERY      colon cancer    COLONOSCOPY N/A 8/5/2016    Procedure: COLONOSCOPY;  Surgeon: Tushar Fritz MD;  Location: [facility] GI LAB;  Service:     EYE SURGERY      HYSTERECTOMY      IR THORACENTESIS  7/25/2024    THROAT SURGERY      TRACHEAL SURGERY      tumor removed benign    TUMOR REMOVAL N/A 2000    trachea     Social History     Substance and Sexual Activity   Alcohol Use Not Currently     Social History     Substance and Sexual Activity   Drug Use No     E-Cigarette/Vaping    E-Cigarette Use Never User      E-Cigarette/Vaping Substances     Social History     Tobacco Use   Smoking Status Never   Smokeless Tobacco Never     Family History:   Family History   Problem Relation Age of Onset    Diabetes Sister     Hypertension Sister     Stroke Mother     Hypertension Mother        Meds/Allergies   current meds:   Current Facility-Administered Medications   Medication Dose Route Frequency    acetaminophen (TYLENOL) tablet 650 mg  650 mg Oral Q6H PRN    apixaban (ELIQUIS) tablet 2.5 mg  2.5 mg Oral BID    ezetimibe (ZETIA) tablet 10 mg  10 mg Oral HS    And    pravastatin (PRAVACHOL) tablet 40 mg  40 mg Oral HS    furosemide (LASIX) injection 40 mg  40 mg Intravenous Daily     "metoprolol tartrate (LOPRESSOR) tablet 25 mg  25 mg Oral Q12H SRNII     Allergies   Allergen Reactions    Azithromycin     Niacin And Related     Keflex [Cephalexin] Rash       Objective   Vitals: Blood pressure 111/63, pulse 67, temperature (!) 97.3 °F (36.3 °C), temperature source Oral, resp. rate 16, height 5' 2\" (1.575 m), weight 51.7 kg (114 lb), SpO2 98%.  Orthostatic Blood Pressures      Flowsheet Row Most Recent Value   Blood Pressure 111/63 filed at 07/25/2024 0916   Patient Position - Orthostatic VS Lying filed at 07/25/2024 0709              Intake/Output Summary (Last 24 hours) at 7/25/2024 1459  Last data filed at 7/25/2024 1013  Gross per 24 hour   Intake 50 ml   Output 2175 ml   Net -2125 ml       Invasive Devices       Peripheral Intravenous Line  Duration             Peripheral IV 07/24/24 Right Antecubital <1 day                    Physical Exam  HENT:      Head: Normocephalic and atraumatic.      Mouth/Throat:      Mouth: Mucous membranes are moist.   Cardiovascular:      Rate and Rhythm: Normal rate. Rhythm irregular.      Heart sounds: Normal heart sounds.   Pulmonary:      Effort: Pulmonary effort is normal.      Breath sounds: Examination of the left-lower field reveals rales. Rales present.   Abdominal:      General: There is no distension.   Musculoskeletal:      Right lower leg: Edema present.      Left lower leg: Edema present.      Comments: 1+ bilateral lower extremity pitting edema  Bilateral tenderness to palpation of lower extremities   Skin:     General: Skin is warm.   Neurological:      Mental Status: She is alert and oriented to person, place, and time.   Psychiatric:         Mood and Affect: Mood normal.         Lab Results: I have personally reviewed pertinent lab results.    Imaging: I have personally reviewed pertinent reports.    EKG: Atrial fibrillation, anterior infarct of undetermined age  VTE Prophylaxis: Eliquis    Code Status: Level 3 - DNAR and DNI  Advance Directive and " Living Will:      Power of :    POLST:      Nicki Lujan MD

## 2024-07-25 NOTE — ASSESSMENT & PLAN NOTE
Sodium 132, initially now resolved   Suspect due to volume overload  IV Lasix as above  Repeat lab in the morning

## 2024-07-25 NOTE — ASSESSMENT & PLAN NOTE
Patient was treated with pneumonia in end of April and beginning of May with doxycycline for 7 days and Levaquin for 7 days respectively.  Chest x-ray in end of April showed lateral segment right middle lobe atelectasis and or infiltrate,small right effusion.   X-ray today as above  Will consult IR for thoracentesis on right side in a.m. for both diagnostic and therapeutic purpose.  IV Lasix as above

## 2024-07-25 NOTE — H&P
[facility]  H&P  Name: Monse Murray 89 y.o. female I MRN: [id number]  Unit/Bed#: 2 [address] Date of Admission: 7/24/2024   Date of Service: 7/24/2024 I Hospital Day: 0      Assessment & Plan   * Dyspnea on exertion  Assessment & Plan  Patient presents with SOB with exertion started prior to Monday this week, patient unable to give exact time frame, with associated mild chest pain during SOB episodes.  Patient denies cough fever chills nausea vomiting dizziness.  SpO2 94% on room air on ED arrival.  SpO2 dropped to 85% after ambulating to bedside commode in ED.  O2 2 L applied, satting mid 90s currently.  Chest x-ray today showed - Mild pulmonary venous congestion with moderate right and small left pleural effusion     1+ bilateral lower extremity edema on exam  VALDEZ likely due to acute on chronic diastolic CHF.  Management as below.  COVID flu RSV negative  Continue supplemental oxygen to maintain sats above 90%    Acute on chronic diastolic congestive heart failure (HCC)  Assessment & Plan  Was on HCTZ 25 mg p.o. daily in the past.  Not currently on home med list.  2D echo in 2019 showed normal EF around 60%, elevated mean left atrial filling pressure, no significant valvular disease.    Given Lasix 40 mg IV in ED with good response.  Will continue Lasix 40 mg IV daily.  Update 2D echo  Telemetry  Cardiac diet with fluid restriction 1800 cc/day  Daily weight, intake output  Consult cardiology          Pleural effusion on right  Assessment & Plan  Patient was treated with pneumonia in end of April and beginning of May with doxycycline for 7 days and Levaquin for 7 days respectively.  Chest x-ray in end of April showed lateral segment right middle lobe atelectasis and or infiltrate,small right effusion.   X-ray today as above  Will consult IR for thoracentesis on right side in a.m. for both diagnostic and therapeutic purpose.  IV Lasix as above    Hyponatremia  Assessment &  Plan  Sodium 132.  Mild.  Suspect due to volume overload  IV Lasix as above  Repeat lab in the morning    Hypertension  Assessment & Plan  Continue metoprolol  BP well-controlled    History of colon cancer  Assessment & Plan  Status post treatment    Paroxysmal atrial fibrillation (HCC)  Assessment & Plan  On Eliquis metoprolol.  Will continue.  EKG controlled A-fib  Optimize electrolytes    Hyperlipidemia  Assessment & Plan  Continue Vytorin         VTE Prophylaxis: Apixaban (Eliquis)  / reason for no mechanical VTE prophylaxis on Eliquis    Code Status: DNR/DNI  POLST: POLST form is not discussed and not completed at this time.    Anticipated Length of Stay:  Patient will be admitted on an inpatient basis with an anticipated length of stay of  > 2 midnights.   Justification for Hospital Stay: VALDEZ, acute on chronic CHF    Total Time for Visit, including Counseling / Coordination of Care:45 minutes. greater than 50% of this total time spent on direct patient counseling and coordination of care.    Chief Complaint:   SOB with exertion started prior to Monday this week     History of Present Illness:    Monse Murray is a 89 y.o. female with PMH of CHF, A-fib, hypertension, hyperlipidemia, colon cancer who presents with SOB with exertion started prior to Monday this week, patient unable to give exact time frame, with associated mild chest pain during SOB episodes.  Patient denies cough fever chills nausea vomiting dizziness.  Patient reports noticed bilateral leg edema recently and having cold feet and cold hands.  Patient denies headache, nausea vomiting diarrhea constipation.  Patient lives alone, ambulates with cane at times.  Has macular degeneration, does not drive anymore.  Patient awake alert oriented x 3 on exam, follows commands.            Review of Systems:    Review of Systems   Constitutional:  Positive for activity change.   Respiratory:  Positive for shortness of breath.    Cardiovascular:  Positive for  leg swelling.        Cold hands and feet   All other systems reviewed and are negative.      Past Medical and Surgical History:     Past Medical History:   Diagnosis Date    A-fib (HCC)     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HCC)     Colon cancer (HCC)     Community acquired pneumonia     last assessed: 10/11/16    Hyperlipidemia     Hypertension     Irregular heart beat     Kidney stone     Lung abnormality     Macular degeneration        Past Surgical History:   Procedure Laterality Date    APPENDECTOMY      BOWEL RESECTION      CATARACT EXTRACTION EXTRACAPSULAR W/ INTRAOCULAR LENS IMPLANTATION Bilateral     COLECTOMY      Partial    COLON SURGERY      colon cancer    COLONOSCOPY N/A 8/5/2016    Procedure: COLONOSCOPY;  Surgeon: Tushar Fritz MD;  Location: [facility] GI LAB;  Service:     EYE SURGERY      HYSTERECTOMY      THROAT SURGERY      TRACHEAL SURGERY      tumor removed benign    TUMOR REMOVAL N/A 2000    trachea       Meds/Allergies:    Prior to Admission medications    Medication Sig Start Date End Date Taking? Authorizing Provider   apixaban (Eliquis) 2.5 mg Take 1 tablet (2.5 mg total) by mouth 2 (two) times a day 7/11/24  Yes Orquidea Chaudhry DO   ezetimibe-simvastatin (VYTORIN) 10-20 mg per tablet TAKE ONE TABLET BY MOUTH AT BEDTIME 6/30/24  Yes Beckie García MD   metoprolol tartrate (LOPRESSOR) 25 mg tablet Take 1 tablet (25 mg total) by mouth every 12 (twelve) hours 7/11/24  Yes Orquidea Chaudhry DO   alendronate (FOSAMAX) 70 mg tablet TAKE ONE TABLET BY MOUTH ONCE A WEEK  Patient not taking: Reported on 7/24/2024 4/16/24   Anne Elizabeth MD     I have reviewed home medications with patient personally.    Allergies:   Allergies   Allergen Reactions    Azithromycin     Niacin And Related     Keflex [Cephalexin] Rash       Social History:     Marital Status:    Occupation: Retired  Patient Pre-hospital Living Situation: Lives alone  Patient Pre-hospital Level of Mobility: Uses cane at times  Patient  "Pre-hospital Diet Restrictions: Cardiac diet  Substance Use History:   Social History     Substance and Sexual Activity   Alcohol Use Not Currently     Social History     Tobacco Use   Smoking Status Never   Smokeless Tobacco Never     Social History     Substance and Sexual Activity   Drug Use No       Family History:    non-contributory    Physical Exam:     Vitals:   Blood Pressure: 137/83 (07/24/24 2204)  Pulse: 86 (07/24/24 2204)  Temperature: 97.7 °F (36.5 °C) (07/24/24 2113)  Temp Source: Temporal (07/24/24 2113)  Respirations: 16 (07/24/24 2204)  Height: 5' 2\" (157.5 cm) (07/24/24 2113)  Weight - Scale: 52.9 kg (116 lb 9.6 oz) (07/24/24 2113)  SpO2: 95 % (07/24/24 2204)    Physical Exam  Vitals and nursing note reviewed.   Constitutional:       Appearance: She is well-developed.   HENT:      Head: Normocephalic and atraumatic.   Neck:      Thyroid: No thyromegaly.      Vascular: No JVD.      Trachea: No tracheal deviation.   Cardiovascular:      Rate and Rhythm: Normal rate and regular rhythm.      Heart sounds: Normal heart sounds.   Pulmonary:      Breath sounds: No wheezing or rales.      Comments: Mild tachypneic during conversation.  Diminished breath sound bilateral lower lobes, on O2 2 L, satting in mid 90s  Abdominal:      General: Bowel sounds are normal. There is no distension.      Palpations: Abdomen is soft.      Tenderness: There is no abdominal tenderness. There is no guarding.   Musculoskeletal:      Cervical back: Neck supple.      Right lower leg: Edema present.      Left lower leg: Edema present.      Comments: 1+ bilateral lower extremity edema   Skin:     General: Skin is warm and dry.   Neurological:      General: No focal deficit present.      Mental Status: She is alert and oriented to person, place, and time.   Psychiatric:         Mood and Affect: Mood and affect and mood normal.         Judgment: Judgment normal.      Comments: Patient tearful during exam, emotional. "         Additional Data:     Lab Results: I have personally reviewed pertinent reports.      Results from last 7 days   Lab Units 07/24/24  1623   WBC Thousand/uL 8.83   HEMOGLOBIN g/dL 15.9*   HEMATOCRIT % 49.6*   PLATELETS Thousands/uL 291   SEGS PCT % 85*   LYMPHO PCT % 5*   MONO PCT % 9   EOS PCT % 0     Results from last 7 days   Lab Units 07/24/24  1623   POTASSIUM mmol/L 4.5   CHLORIDE mmol/L 98   CO2 mmol/L 31   BUN mg/dL 14   CREATININE mg/dL 0.53*   CALCIUM mg/dL 8.7   ALK PHOS U/L 58   ALT U/L 20   AST U/L 30           Imaging: I have personally reviewed pertinent reports.      XR chest 1 view portable    Result Date: 7/24/2024  Narrative: XR CHEST PORTABLE INDICATION: dyspnea. COMPARISON: CXR 6/27/2024, chest CT 12/02/2019. FINDINGS: Mild pulmonary venous congestion with moderate right and small left pleural effusion. Moderate bibasilar atelectasis. Chronic bilateral scar. Normal cardiomediastinal silhouette. Bones are unremarkable for age. Normal upper abdomen.     Impression: Mild pulmonary venous congestion with moderate right and small left pleural effusion. Workstation performed: [id number]     CT head without contrast    Result Date: 7/24/2024  Narrative: CT BRAIN - WITHOUT CONTRAST INDICATION:   confusion. COMPARISON: CT head 11/22/2011 TECHNIQUE:  CT examination of the brain was performed.  Multiplanar 2D reformatted images were created from the source data. Radiation dose length product (DLP) for this visit:  864.56 mGy-cm .  This examination, like all CT scans performed in the [facility], was performed utilizing techniques to minimize radiation dose exposure, including the use of iterative  reconstruction and automated exposure control. IMAGE QUALITY:  Diagnostic. FINDINGS: PARENCHYMA: Decreased attenuation is noted in periventricular and subcortical white matter demonstrating an appearance that is statistically most likely to represent progressive moderate microangiopathic  change; this appearance is similar when compared to most recent prior examination. Chronic basal ganglia lacunar infarcts. No CT signs of acute infarction.  No intracranial mass, mass effect or midline shift.  No acute parenchymal hemorrhage. VENTRICLES AND EXTRA-AXIAL SPACES:  Normal for the patient's age. VISUALIZED ORBITS: Normal visualized orbits. PARANASAL SINUSES: Normal visualized paranasal sinuses. CALVARIUM AND EXTRACRANIAL SOFT TISSUES:  Normal.     Impression: No acute intracranial abnormality. Workstation performed: [id number]     XR chest pa & lateral    Result Date: 7/4/2024  Narrative: XR CHEST PA & LATERAL DUAL ENERGY SUBTRACTION. INDICATION:  J90: Pleural effusion, not elsewhere classified. History of colon cancer. COMPARISON: CXR 5/21/2024, chest CT 12/02/2019, abdomen CT 4/13/2021. CT colonoscopy 5/22/2023. FINDINGS: Increased opacity in the lateral right base. Probable small loculated right pleural effusion. No pneumothorax. Normal heart size. Calcified hilar and mediastinal nodes indicating benign granulomatous disease. Bones are unremarkable for age. Unremarkable upper abdomen.     Impression: Increased opacity in the lateral right base, question pneumonia. Consider further evaluation with a chest CT with no contrast to evaluate for metastatic disease if clinically indicated given history of colon cancer. Probable small loculated right pleural effusion. This study was marked for significant notification and follow up. Workstation performed: [id number]       EKG, Pathology, and Other Studies Reviewed on Admission:   EKG: Controlled A-fib    [employer] Records Reviewed: Yes     ** Please Note: Dragon 360 Dictation voice to text software may have been used in the creation of this document. **

## 2024-07-25 NOTE — TELEPHONE ENCOUNTER
Dr. Butts:    Patient's son dropped off paperwork for your review and signature.  Please advise when ready for pickup.

## 2024-07-25 NOTE — ASSESSMENT & PLAN NOTE
Patient presented with dyspnea on exertion with associated mild chest pain during SOB episodes.      Was on HCTZ 25 mg p.o. daily in the past.  Not currently on home med list.  2D echo in 2019 showed normal EF around 60%, elevated mean left atrial filling pressure, no significant valvular disease.    Given Lasix 40 mg IV in ED with good response.  Continue Lasix 40 mg IV daily.  2D echo pending  Cardiac diet with fluid restriction 1800 cc/day  Daily weight, intake output  Continue Lopressor.  Cardiology consulted

## 2024-07-25 NOTE — ASSESSMENT & PLAN NOTE
Was on HCTZ 25 mg p.o. daily in the past.  Not currently on home med list.  2D echo in 2019 showed normal EF around 60%, elevated mean left atrial filling pressure, no significant valvular disease.    Given Lasix 40 mg IV in ED with good response.  Will continue Lasix 40 mg IV daily.  Update 2D echo  Telemetry  Cardiac diet with fluid restriction 1800 cc/day  Daily weight, intake output  Consult cardiology

## 2024-07-25 NOTE — ASSESSMENT & PLAN NOTE
Patient was treated with pneumonia in end of April and beginning of May with doxycycline for 7 days and Levaquin for 7 days respectively.  Chest x-ray in end of April showed lateral segment right middle lobe atelectasis and or infiltrate,small right effusion.   X-ray today as above  Status post right-sided thoracentesis with removal of 700 mL of marija pleural fluid by IR today  Follow-up fluid studies

## 2024-07-25 NOTE — PLAN OF CARE
Problem: PAIN - ADULT  Goal: Verbalizes/displays adequate comfort level or baseline comfort level  Description: Interventions:  - Encourage patient to monitor pain and request assistance  - Assess pain using appropriate pain scale  - Administer analgesics based on type and severity of pain and evaluate response  - Implement non-pharmacological measures as appropriate and evaluate response  - Consider cultural and social influences on pain and pain management  - Notify physician/advanced practitioner if interventions unsuccessful or patient reports new pain  Outcome: Progressing     Problem: PAIN - ADULT  Goal: Verbalizes/displays adequate comfort level or baseline comfort level  Description: Interventions:  - Encourage patient to monitor pain and request assistance  - Assess pain using appropriate pain scale  - Administer analgesics based on type and severity of pain and evaluate response  - Implement non-pharmacological measures as appropriate and evaluate response  - Consider cultural and social influences on pain and pain management  - Notify physician/advanced practitioner if interventions unsuccessful or patient reports new pain  Outcome: Progressing     Problem: GENITOURINARY - ADULT  Goal: Maintains or returns to baseline urinary function  Description: INTERVENTIONS:  - Assess urinary function  - Encourage oral fluids to ensure adequate hydration if ordered  - Administer IV fluids as ordered to ensure adequate hydration  - Administer ordered medications as needed  - Offer frequent toileting  - Follow urinary retention protocol if ordered  Outcome: Progressing     Problem: CARDIOVASCULAR - ADULT  Goal: Maintains optimal cardiac output and hemodynamic stability  Description: INTERVENTIONS:  - Monitor I/O, vital signs and rhythm  - Monitor for S/S and trends of decreased cardiac output  - Administer and titrate ordered vasoactive medications to optimize hemodynamic stability  - Assess quality of pulses, skin  color and temperature  - Assess for signs of decreased coronary artery perfusion  - Instruct patient to report change in severity of symptoms  Outcome: Progressing     Problem: METABOLIC, FLUID AND ELECTROLYTES - ADULT  Goal: Electrolytes maintained within normal limits  Description: INTERVENTIONS:  - Monitor labs and assess patient for signs and symptoms of electrolyte imbalances  - Administer electrolyte replacement as ordered  - Monitor response to electrolyte replacements, including repeat lab results as appropriate  - Instruct patient on fluid and nutrition as appropriate  Outcome: Progressing

## 2024-07-25 NOTE — ASSESSMENT & PLAN NOTE
Patient presents with SOB with exertion started prior to Monday this week, patient unable to give exact time frame, with associated mild chest pain during SOB episodes.  Patient denies cough fever chills nausea vomiting dizziness.  SpO2 94% on room air on ED arrival.  SpO2 dropped to 85% after ambulating to bedside commode in ED.  O2 2 L applied, satting mid 90s currently.  Chest x-ray today showed - Mild pulmonary venous congestion with moderate right and small left pleural effusion     1+ bilateral lower extremity edema on exam  VALDEZ likely due to acute on chronic diastolic CHF.  Management as below.  COVID flu RSV negative  Continue supplemental oxygen to maintain sats above 90%

## 2024-07-25 NOTE — DISCHARGE INSTRUCTIONS
Thoracentesis   WHAT YOU NEED TO KNOW:   A thoracentesis is a procedure to remove extra fluid or air from between your lungs and your inner chest wall. Air or fluid buildup may make it hard for you to breathe. A thoracentesis allows your lungs to expand fully so you can breathe more easily.    DISCHARGE INSTRUCTIONS:     Small amount of shoulder pain and bloody sputum is normal after a Thoracentesis.     Rest:  Rest when you feel it is needed. Slowly start to do more each day. Return to your daily activities as directed.     Resume your normal diet. Small sips of flat soda will help mild nausea.    Do not smoke:  If you smoke, it is never too late to quit. Ask for information about how to stop smoking if you need help.    Contact Interventional Radiology at [phone] (RACHEL PATIENTS: Contact Interventional Radiology at [phone]) (KATHRIN PATIENTS: Contact Interventional Radiology at [phone]) if:   You have a fever.    Your puncture site is red, warm, swollen, or draining pus.    You have questions or concerns about your procedure, medicine, or care.    Seek care immediately or call 911 if:   Severe chest pain with inspiration and shortness of breath    Large amounts of blood in your sputum    Follow up with your healthcare provider as directed.

## 2024-07-25 NOTE — ASSESSMENT & PLAN NOTE
Patient presents with SOB started prior to Monday this week, patient unable to give exact time frame, with associated mild chest pain during SOB episode.  Patient denies cough fever chills nausea vomiting dizziness.  SpO2 85% on room air

## 2024-07-25 NOTE — BRIEF OP NOTE (RAD/CATH)
INTERVENTIONAL RADIOLOGY PROCEDURE NOTE    Date: 7/25/2024    Procedure: Right thoracentesis  Procedure Summary       Date:  Room / Location:     Anesthesia Start:  Anesthesia Stop:     Procedure:  Diagnosis:     Scheduled Providers:  Responsible Provider:     Anesthesia Type: Not recorded ASA Status: Not recorded            Preoperative diagnosis:   1. Acute hypoxic respiratory failure (HCC)    2. Confusion    3. Pleural effusion, right    4. Chronic diastolic congestive heart failure (HCC)         Postoperative diagnosis: Same.    Surgeon: Rajendra Raines MD     Assistant: None. No qualified resident was available.    Blood loss: None    Specimens: 700 mL marija pleural fluid removed with sample sent to lab.     Findings: Successful right thoracentesis.    Complications: None immediate.    Anesthesia: local

## 2024-07-25 NOTE — SEDATION DOCUMENTATION
Right thoracentesis completed. 700 ml light marija fluid removed. Bandaid to site. Specimen sent to lab. Patient tolerated well

## 2024-07-26 LAB
ALBUMIN SERPL BCG-MCNC: 3 G/DL (ref 3.5–5)
ALP SERPL-CCNC: 45 U/L (ref 34–104)
ALT SERPL W P-5'-P-CCNC: 16 U/L (ref 7–52)
ANION GAP SERPL CALCULATED.3IONS-SCNC: 2 MMOL/L (ref 4–13)
AST SERPL W P-5'-P-CCNC: 24 U/L (ref 13–39)
BACTERIA UR CULT: NORMAL
BILIRUB SERPL-MCNC: 1.45 MG/DL (ref 0.2–1)
BUN SERPL-MCNC: 18 MG/DL (ref 5–25)
CALCIUM ALBUM COR SERPL-MCNC: 8.9 MG/DL (ref 8.3–10.1)
CALCIUM SERPL-MCNC: 8.1 MG/DL (ref 8.4–10.2)
CHLORIDE SERPL-SCNC: 95 MMOL/L (ref 96–108)
CO2 SERPL-SCNC: 38 MMOL/L (ref 21–32)
CREAT SERPL-MCNC: 0.61 MG/DL (ref 0.6–1.3)
GFR SERPL CREATININE-BSD FRML MDRD: 79 ML/MIN/1.73SQ M
GLUCOSE SERPL-MCNC: 100 MG/DL (ref 65–140)
LDH SERPL-CCNC: 215 U/L (ref 140–271)
POTASSIUM SERPL-SCNC: 4 MMOL/L (ref 3.5–5.3)
PROT SERPL-MCNC: 6.1 G/DL (ref 6.4–8.4)
SODIUM SERPL-SCNC: 135 MMOL/L (ref 135–147)

## 2024-07-26 PROCEDURE — 80053 COMPREHEN METABOLIC PANEL: CPT | Performed by: FAMILY MEDICINE

## 2024-07-26 PROCEDURE — 83615 LACTATE (LD) (LDH) ENZYME: CPT | Performed by: FAMILY MEDICINE

## 2024-07-26 PROCEDURE — 99232 SBSQ HOSP IP/OBS MODERATE 35: CPT | Performed by: INTERNAL MEDICINE

## 2024-07-26 PROCEDURE — 99232 SBSQ HOSP IP/OBS MODERATE 35: CPT | Performed by: FAMILY MEDICINE

## 2024-07-26 RX ORDER — FUROSEMIDE 20 MG/1
20 TABLET ORAL DAILY
Status: DISCONTINUED | OUTPATIENT
Start: 2024-07-27 | End: 2024-07-28 | Stop reason: HOSPADM

## 2024-07-26 RX ADMIN — APIXABAN 2.5 MG: 2.5 TABLET, FILM COATED ORAL at 17:47

## 2024-07-26 RX ADMIN — EZETIMIBE 10 MG: 10 TABLET ORAL at 22:10

## 2024-07-26 RX ADMIN — METOPROLOL TARTRATE 25 MG: 25 TABLET, FILM COATED ORAL at 22:13

## 2024-07-26 RX ADMIN — APIXABAN 2.5 MG: 2.5 TABLET, FILM COATED ORAL at 09:29

## 2024-07-26 RX ADMIN — METOPROLOL TARTRATE 25 MG: 25 TABLET, FILM COATED ORAL at 09:29

## 2024-07-26 RX ADMIN — PRAVASTATIN SODIUM 40 MG: 40 TABLET ORAL at 22:10

## 2024-07-26 RX ADMIN — FUROSEMIDE 40 MG: 10 INJECTION, SOLUTION INTRAMUSCULAR; INTRAVENOUS at 09:29

## 2024-07-26 NOTE — PLAN OF CARE
Problem: SAFETY ADULT  Goal: Patient will remain free of falls  Description: INTERVENTIONS:  - Educate patient/family on patient safety including physical limitations  - Instruct patient to call for assistance with activity   - Consult OT/PT to assist with strengthening/mobility   - Keep Call bell within reach  - Keep bed low and locked with side rails adjusted as appropriate  - Keep care items and personal belongings within reach  - Initiate and maintain comfort rounds  - Make Fall Risk Sign visible to staff  - Offer Toileting every 2 Hours, in advance of need  - Initiate/Maintain bed/chair alarm  - Obtain necessary fall risk management equipment: fall risk sign   - Apply yellow socks and bracelet for high fall risk patients  - Consider moving patient to room near nurses station  7/26/2024 0726 by Anabel James RN  Outcome: Progressing     Problem: CARDIOVASCULAR - ADULT  Goal: Maintains optimal cardiac output and hemodynamic stability  Description: INTERVENTIONS:  - Monitor I/O, vital signs and rhythm  - Monitor for S/S and trends of decreased cardiac output  - Administer and titrate ordered vasoactive medications to optimize hemodynamic stability  - Assess quality of pulses, skin color and temperature  - Assess for signs of decreased coronary artery perfusion  - Instruct patient to report change in severity of symptoms  7/26/2024 0726 by Anabel James RN  Outcome: Progressing     Problem: CARDIOVASCULAR - ADULT  Goal: Absence of cardiac dysrhythmias or at baseline rhythm  Description: INTERVENTIONS:  - Continuous cardiac monitoring, vital signs, obtain 12 lead EKG if ordered  - Administer antiarrhythmic and heart rate control medications as ordered  - Monitor electrolytes and administer replacement therapy as ordered  7/26/2024 0726 by Anabel James RN  Outcome: Progressing     Problem: METABOLIC, FLUID AND ELECTROLYTES - ADULT  Goal: Fluid balance maintained  Description: INTERVENTIONS:  - Monitor labs   -  Monitor I/O and WT  - Instruct patient on fluid and nutrition as appropriate  - Assess for signs & symptoms of volume excess or deficit  7/26/2024 0726 by Anabel James RN  Outcome: Progressing

## 2024-07-26 NOTE — PROGRESS NOTES
07/26/24 1300   Clinical Encounter Type   Visited With Patient and family together   Routine Visit Introduction   Referral From Nurse   S[facility]ism Encounters   Sabianism Needs Prayer      introductory visit. Monse and DUSTY Pack reminisced, shared that Monse will be moving to [place] with family. Prayers requested and appreciated. Monse asked me for my phone number so  she could talk with me, but I suggested that when she moves the family connect her with a  at the facility or a local [facility].  remains available.

## 2024-07-26 NOTE — PROGRESS NOTES
[facility]  Progress Note  Name: Monse Murray I  MRN: [id number]  Unit/Bed#: 2 [address] Date of Admission: 7/24/2024   Date of Service: 7/26/2024 I Hospital Day: 2    Assessment & Plan   * Acute on chronic diastolic congestive heart failure (HCC)  Assessment & Plan  Patient presented with dyspnea on exertion with associated mild chest pain during SOB episodes.    Was on HCTZ 25 mg p.o. daily in the past.  Not currently on home med list.  2D echo in 2019 showed normal EF around 60%, elevated mean left atrial filling pressure, no significant valvular disease.    Given Lasix 40 mg IV in ED with good response.  Continue Lasix 40 mg IV daily.  Possible transition to p.o. diuretics starting tomorrow  2D echo -EF normal and abnormal diastolic function.  Left atrium is moderately dilated.  Right ventricular systolic pressure is mild to moderately elevated at 50 mmHg  Cardiac diet with fluid restriction 1800 cc/day  Daily weight, intake output  Continue Lopressor.  Appreciate cardiology input          Bilateral pleural effusion  Assessment & Plan  Patient was treated with pneumonia in end of April and beginning of May with doxycycline for 7 days and Levaquin for 7 days respectively.  Chest x-ray in end of April showed lateral segment right middle lobe atelectasis and or infiltrate,small right effusion.   X-ray showed venous congestion with moderate right and small left pleural effusion  Status post right-sided thoracentesis with removal of 700 mL of marija pleural fluid by IR on 7/25   Fluid culture with no growth check serum LDH.     Hypertension  Assessment & Plan  Continue metoprolol  BP well-controlled.    History of colon cancer  Assessment & Plan  Status post treatment    Paroxysmal atrial fibrillation (HCC)  Assessment & Plan  On Eliquis, metoprolol which is being continued  EKG controlled A-fib.    Hyperlipidemia  Assessment & Plan  Continue Zetia, statin.    Hyponatremia-resolved  as of 2024  Assessment & Plan  Sodium 132, initially now resolved   Suspect due to volume overload  IV Lasix as above               VTE Pharmacologic Prophylaxis:    Eliquis    Mobility:   Basic Mobility Inpatient Raw Score: 23  JH-HLM Goal: 7: Walk 25 feet or more  JH-HLM Achieved: 8: Walk 250 feet ot more  JH-HLM Goal achieved. Continue to encourage appropriate mobility.    Patient Centered Rounds: I performed bedside rounds with nursing staff today.   Discussions with Specialists or Other Care Team Provider: yes - cardio    Education and Discussions with Family / Patient: Updated  (frankie and his wife) at bedside.    Total Time Spent on Date of Encounter in care of patient: This time was spent on one or more of the following: performing physical exam; counseling and coordination of care; obtaining or reviewing history; documenting in the medical record; reviewing/ordering tests, medications or procedures; communicating with other healthcare professionals and discussing with patient's family/caregivers.    Current Length of Stay: 2 day(s)  Current Patient Status: Inpatient   Certification Statement: The patient will continue to require additional inpatient hospital stay due to CHF exacerbation requiring IV Lasix  Discharge Plan: Anticipate discharge in 24-48 hrs to discharge location to be determined pending rehab evaluations.    Code Status: Level 3 - DNAR and DNI    Subjective:     Patient reports improved shortness of breath even with ambulation.  States her leg edema has been progressively worsening over the last few years    Objective:     Vitals:   Temp (24hrs), Av.8 °F (36.6 °C), Min:97.4 °F (36.3 °C), Max:98 °F (36.7 °C)    Temp:  [97.4 °F (36.3 °C)-98 °F (36.7 °C)] 97.7 °F (36.5 °C)  HR:  [65-82] 78  Resp:  [16-20] 20  BP: ()/(50-78) 118/69  SpO2:  [86 %-92 %] 91 %  Body mass index is 20.85 kg/m².     Input and Output Summary (last 24 hours):     Intake/Output Summary (Last  24 hours) at 7/26/2024 1004  Last data filed at 7/26/2024 0951  Gross per 24 hour   Intake --   Output 525 ml   Net -525 ml       Physical Exam:   Physical Exam  Vitals reviewed.   Constitutional:       General: She is not in acute distress.     Appearance: She is not toxic-appearing.   HENT:      Head: Normocephalic and atraumatic.   Eyes:      General: No scleral icterus.  Cardiovascular:      Rate and Rhythm: Normal rate. Rhythm irregular.   Pulmonary:      Effort: Pulmonary effort is normal. No respiratory distress.      Breath sounds: Normal breath sounds. No wheezing or rales.   Abdominal:      General: Bowel sounds are normal. There is no distension.      Palpations: Abdomen is soft.      Tenderness: There is no abdominal tenderness.   Musculoskeletal:      Right lower leg: Edema present.      Left lower leg: Edema present.   Neurological:      Mental Status: She is alert.          Additional Data:     Labs:  Results from last 7 days   Lab Units 07/25/24  0609 07/24/24  1623   WBC Thousand/uL 7.05 8.83   HEMOGLOBIN g/dL 14.8 15.9*   HEMATOCRIT % 46.2* 49.6*   PLATELETS Thousands/uL 252 291   SEGS PCT %  --  85*   LYMPHO PCT %  --  5*   MONO PCT %  --  9   EOS PCT %  --  0     Results from last 7 days   Lab Units 07/26/24  0435   SODIUM mmol/L 135   POTASSIUM mmol/L 4.0   CHLORIDE mmol/L 95*   CO2 mmol/L 38*   BUN mg/dL 18   CREATININE mg/dL 0.61   ANION GAP mmol/L 2*   CALCIUM mg/dL 8.1*   ALBUMIN g/dL 3.0*   TOTAL BILIRUBIN mg/dL 1.45*   ALK PHOS U/L 45   ALT U/L 16   AST U/L 24   GLUCOSE RANDOM mg/dL 100         Results from last 7 days   Lab Units 07/25/24  0704   POC GLUCOSE mg/dl 94               Lines/Drains:  Invasive Devices       Peripheral Intravenous Line  Duration             Peripheral IV 07/24/24 Right Antecubital 1 day                    Imaging: Reviewed radiology reports from this admission including: ECHO    Recent Cultures (last 7 days):   Results from last 7 days   Lab Units 07/25/24  0825  07/24/24  1721   GRAM STAIN RESULT  1+ Polys  No organisms seen  --    URINE CULTURE   --  50,000-59,000 cfu/ml       Last 24 Hours Medication List:   Current Facility-Administered Medications   Medication Dose Route Frequency Provider Last Rate    acetaminophen  650 mg Oral Q6H PRN NILO Wilder      apixaban  2.5 mg Oral BID NILO Wilder      ezetimibe  10 mg Oral HS NILO Wilder      And    pravastatin  40 mg Oral HS CuiNILO Cherry      furosemide  40 mg Intravenous Daily CuNILO Paz      metoprolol tartrate  25 mg Oral Q12H [facility] NILO Wilder          Today, Patient Was Seen By: Patricia Rosario DO    **Please Note: This note may have been constructed using a voice recognition system.**

## 2024-07-26 NOTE — ASSESSMENT & PLAN NOTE
Patient presented with dyspnea on exertion with associated mild chest pain during SOB episodes.    Was on HCTZ 25 mg p.o. daily in the past.  Not currently on home med list.  2D echo in 2019 showed normal EF around 60%, elevated mean left atrial filling pressure, no significant valvular disease.    Given Lasix 40 mg IV in ED with good response.  Continue Lasix 40 mg IV daily.  Possible transition to p.o. diuretics starting tomorrow  2D echo -EF normal and abnormal diastolic function.  Left atrium is moderately dilated.  Right ventricular systolic pressure is mild to moderately elevated at 50 mmHg  Cardiac diet with fluid restriction 1800 cc/day  Daily weight, intake output  Continue Lopressor.  Appreciate cardiology input

## 2024-07-26 NOTE — ASSESSMENT & PLAN NOTE
Patient was treated with pneumonia in end of April and beginning of May with doxycycline for 7 days and Levaquin for 7 days respectively.  Chest x-ray in end of April showed lateral segment right middle lobe atelectasis and or infiltrate,small right effusion.   X-ray showed venous congestion with moderate right and small left pleural effusion  Status post right-sided thoracentesis with removal of 700 mL of marija pleural fluid by IR on 7/25   Fluid culture with no growth check serum LDH.

## 2024-07-26 NOTE — DISCHARGE INSTR - OTHER ORDERS
Skin care plans:    1-Apply Prevent barrier cream or equivalent to bilateral sacrum, buttock and heels 2x/day and as needed.  2-Float heels on 2 pillows to offload pressure so heels are not in contact with mattress/reclining chair or pillows.  3-Use pressure redistribution cushion in chair when out of bed. Avoid prolonged sitting.  4-Moisturize skin daily with skin nourishing cream.  5-Turn/reposition every 2 hrs in bed for pressure re-distribution on skin.

## 2024-07-26 NOTE — PLAN OF CARE
Problem: CARDIOVASCULAR - ADULT  Goal: Maintains optimal cardiac output and hemodynamic stability  Description: INTERVENTIONS:  - Monitor I/O, vital signs and rhythm  - Monitor for S/S and trends of decreased cardiac output  - Administer and titrate ordered vasoactive medications to optimize hemodynamic stability  - Assess quality of pulses, skin color and temperature  - Assess for signs of decreased coronary artery perfusion  - Instruct patient to report change in severity of symptoms  Outcome: Progressing     Problem: CARDIOVASCULAR - ADULT  Goal: Absence of cardiac dysrhythmias or at baseline rhythm  Description: INTERVENTIONS:  - Continuous cardiac monitoring, vital signs, obtain 12 lead EKG if ordered  - Administer antiarrhythmic and heart rate control medications as ordered  - Monitor electrolytes and administer replacement therapy as ordered  Outcome: Progressing     Problem: METABOLIC, FLUID AND ELECTROLYTES - ADULT  Goal: Fluid balance maintained  Description: INTERVENTIONS:  - Monitor labs   - Monitor I/O and WT  - Instruct patient on fluid and nutrition as appropriate  - Assess for signs & symptoms of volume excess or deficit  Outcome: Progressing     Problem: SAFETY ADULT  Goal: Patient will remain free of falls  Description: INTERVENTIONS:  - Educate patient/family on patient safety including physical limitations  - Instruct patient to call for assistance with activity   - Consult OT/PT to assist with strengthening/mobility   - Keep Call bell within reach  - Keep bed low and locked with side rails adjusted as appropriate  - Keep care items and personal belongings within reach  - Initiate and maintain comfort rounds  - Make Fall Risk Sign visible to staff  - Offer Toileting every 2 Hours, in advance of need  - Initiate/Maintain bed/chair alarm  - Obtain necessary fall risk management equipment: fall risk sign  - Apply yellow socks and bracelet for high fall risk patients  - Consider moving patient to  room near nurses station  Outcome: Progressing

## 2024-07-26 NOTE — WOUND OSTOMY CARE
"Progress Note - Wound   Monse Murray 89 y.o. female MRN: [id number]  Unit/Bed#: [address] [place] [address] Encounter: [id number]      Assessment:   This is a 89 year old female patient admitted on 7/24 with acute on chronic congestive heart failure. Wound consult requested for \"wound on sacrum and bilateral heels\" which was done remotely based on photos taken on admission.    Assessment Findings:  0-[id number]-[id number] with intact erythema documented as stage 1 pressure injury present on admission. Orders in place for wound/skin care and for prevention.  2-Bilateral heels with intact erythema and light purple discoloration documented as deep tissue pressure injuries present on admission. Orders in place for wound/skin care and for prevention.    Skin care plans:  1-Apply Prevent barrier cream to bilateral sacrum, buttock and heels BID and PRN  2-Float heels on 2 pillows to offload pressure so heels are not in contact with mattress/reclining chair or pillows.  3-Ehob pressure redistribution cushion in chair when out of bed. Avoid prolonged sitting due to sacrobuttock pressure injury.  4-Moisturize skin daily with skin nourishing cream.  5-Turn/reposition q2h or when medically stable for pressure re-distribution on skin.     Wound 07/25/24 Pressure Injury Sacrum (Active)   Wound Image   07/25/24 0900   Wound 07/25/24 Pressure Injury Heel Left (Active)   Wound Image    07/25/24 0900   Wound 07/25/24 Pressure Injury Heel Right (Active)   Wound Image   07/25/24 0900     Discussed assessment findings, and plan of care/recommendations with Anabel LEE.    Wound care signing off, please call or text with questions and concerns.    Recommendations written as orders.  Xena Higgins MSN, RN, CWON                         "

## 2024-07-26 NOTE — PROGRESS NOTES
"Progress Note - Cardiology   [facility]     Monse Murray 89 y.o. female MRN: [id number]  : 1933  Unit/Bed#: [address] [place] [address] Encounter: [id number]    Assessment and Plan:   Assessment:  Acute on chronic CHF  Dyspnea on exertion  Paroxysmal atrial fibrillation  Hypertension  Hyperlipidemia  Plan:  Dyspnea with exertion on presentation, occasional mild chest pain. . EKG shows atrial fibrillation, anterior infarct of undetermined age. Echo yesterday shows mildly increased LV wall thickness, EF of 64% and abnormal diastolic function. Left atrium is moderately dilated and RV systolic pressure is mildly to moderately elevated. Elevated filling pressure and IVC compared to previous echo. No change in weight.   Continue lasix 40 mg IV, transition to p.o. loop diuretic prior to discharge  Continue lopressor 25 mg  Cardiac diet with 1.8L fluid restriction  Daily weight and I/O monitoring     2. CXR showed mild pulmonary vascular congestion with moderate right and small left pleural effusion. S/p right-sided thoracentesis with removal of 700 ml of marija pleural fluid.     3. EKG shows atrial fibrillation, anterior infarct of undetermined age.  Continue eliquis 2.5 mg and metoprolol 25 mg     4. Continue metoprolol 25 mg     5. Continue zetia and statin       Subjective / Objective:   Patient examined at bedside. She states that she feels much better. She reports improvement in her shortness of breath and has not had chest pain. She denies palpitations, headache, fever and chills.     Vitals: Blood pressure 117/70, pulse 80, temperature 97.7 °F (36.5 °C), temperature source Oral, resp. rate 20, height 5' 2\" (1.575 m), weight 51.7 kg (114 lb), SpO2 92%.  Vitals:    24 0740 24 0557   Weight: 51.7 kg (114 lb) 51.7 kg (114 lb)     Body mass index is 20.85 kg/m².  BP Readings from Last 3 Encounters:   24 117/70   24 141/91   24 105/80     Orthostatic Blood Pressures  "     Flowsheet Row Most Recent Value   Blood Pressure 117/70 filed at 07/26/2024 0723   Patient Position - Orthostatic VS Sitting filed at 07/26/2024 0723          I/O         07/24 0701 07/25 0700 07/25 0701 07/26 0700 07/26 0701 07/27 0700    IV Piggyback 50      Total Intake(mL/kg) 50 (1)      Urine (mL/kg/hr) 1250 225 (0.2) 100 (1.2)    Other  700     Total Output 1250 925 100    Net -1200 -925 -100                 Invasive Devices       Peripheral Intravenous Line  Duration             Peripheral IV 07/24/24 Right Antecubital 1 day                      Intake/Output Summary (Last 24 hours) at 7/26/2024 0838  Last data filed at 7/26/2024 0743  Gross per 24 hour   Intake --   Output 325 ml   Net -325 ml         Physical Exam:   Physical Exam  HENT:      Head: Normocephalic and atraumatic.      Mouth/Throat:      Mouth: Mucous membranes are moist.   Eyes:      Conjunctiva/sclera: Conjunctivae normal.   Cardiovascular:      Rate and Rhythm: Normal rate. Rhythm irregular.      Pulses: Normal pulses.      Heart sounds: Normal heart sounds.   Pulmonary:      Effort: Pulmonary effort is normal.      Breath sounds: Normal breath sounds.   Musculoskeletal:      Cervical back: Normal range of motion.      Right lower leg: Edema present.      Left lower leg: Edema present.   Skin:     General: Skin is warm.   Neurological:      Mental Status: She is alert and oriented to person, place, and time.   Psychiatric:         Mood and Affect: Mood normal.                Medications/ Allergies:     Current Facility-Administered Medications   Medication Dose Route Frequency Provider Last Rate    acetaminophen  650 mg Oral Q6H PRN NILO Wilder      apixaban  2.5 mg Oral BID NILO Wilder      ezetimibe  10 mg Oral HS NILO Wilder      And    pravastatin  40 mg Oral HS NILO Wilder      furosemide  40 mg Intravenous Daily NILO Wilder      metoprolol tartrate  25 mg Oral Q12H [facility] NILO Wilder        acetaminophen, 650 mg, Q6H PRN      Allergies   Allergen Reactions    Azithromycin     Niacin And Related     Keflex [Cephalexin] Rash       VTE Pharmacologic Prophylaxis:   Sequential compression device (Venodyne)     Labs:   Troponins:  Results from last 7 days   Lab Units 07/25/24  0609 07/24/24  1947   HSTNI D2 ng/L  --  -1   HSTNI D4 ng/L -1  --      CBC with diff:  Results from last 7 days   Lab Units 07/25/24  0609 07/24/24  1623   WBC Thousand/uL 7.05 8.83   HEMOGLOBIN g/dL 14.8 15.9*   HEMATOCRIT % 46.2* 49.6*   MCV fL 99* 98   PLATELETS Thousands/uL 252 291   RBC Million/uL 4.69 5.08   MCH pg 31.6 31.3   MCHC g/dL 32.0 32.1   RDW % 14.3 14.2   MPV fL 9.1 9.3   NRBC AUTO /100 WBCs  --  0     CMP:  Results from last 7 days   Lab Units 07/26/24  0435 07/25/24  0609 07/24/24  1623   SODIUM mmol/L 135 137 132*   POTASSIUM mmol/L 4.0 3.8 4.5   CHLORIDE mmol/L 95* 98 98   CO2 mmol/L 38* 36* 31   ANION GAP mmol/L 2* 3* 3*   BUN mg/dL 18 13 14   CREATININE mg/dL 0.61 0.50* 0.53*   CALCIUM mg/dL 8.1* 8.0* 8.7   AST U/L 24 24 30   ALT U/L 16 17 20   ALK PHOS U/L 45 45 58   TOTAL PROTEIN g/dL 6.1* 6.0* 7.2   ALBUMIN g/dL 3.0* 3.0* 3.6   TOTAL BILIRUBIN mg/dL 1.45* 1.27* 1.41*   EGFR ml/min/1.73sq m 79 84 83     Magnesium:  Results from last 7 days   Lab Units 07/25/24  0609   MAGNESIUM mg/dL 2.1     TSH:  Results from last 7 days   Lab Units 07/25/24  0609   TSH 3RD GENERATON uIU/mL 1.819       Imaging & Testing   I have personally reviewed pertinent reports.    Echo complete    Result Date: 7/25/2024  Narrative:   Left Ventricle: Left ventricular cavity size is normal. Wall thickness is mildly increased. The left ventricular ejection fraction is 64% by single dimension measurement. Systolic function is normal. Wall motion is normal. Diastolic function is abnormal.   Right Ventricle: Right ventricular cavity size is normal. Systolic function is normal.   Left Atrium: The atrium is moderately dilated (42-48 mL/m2).    Aortic Valve: There is aortic valve sclerosis.   Mitral Valve: There is mild regurgitation.   Tricuspid Valve: There is mild regurgitation. The right ventricular systolic pressure is mildly to moderately elevated at 50mmHg Compared to prior study, elevated filling pressure and IVC     IR IN-Patient Thoracentesis    Result Date: 7/25/2024  Narrative: PROCEDURE: Ultrasound-guided right thoracentesis Procedural Personnel Attending physician(s): Dr. Raines Pre-procedure diagnosis: Pleural effusion Post-procedure diagnosis: Same Indication: Right-sided pleural effusion. PROCEDURE SUMMARY: - Limited thoracic ultrasound - Ultrasound-guided right thoracentesis PROCEDURE DETAILS: Pre-procedure Consent: Informed consent for the procedure including risks, benefits and alternatives was obtained and time-out was performed prior to the procedure. Preparation: The site was prepared and draped using maximal sterile barrier technique including cutaneous antisepsis. Limited thoracic ultrasound Limited thoracic ultrasound was performed using a curved transducer. A safe window for thoracentesis was identified. Right hemithorax findings: Moderate pleural effusion Right thoracentesis Local anesthesia was administered. The pleural space was accessed under real-time ultrasound guidance  and fluid return confirmed position. The fluid was drained. The catheter was removed, and a sterile bandage was applied. Catheter placed: 5F Jorge Additional Details Specimens removed: Pleural fluid Estimated blood loss (mL): None Complications: No immediate complications.     Impression: Ultrasound-guided right thoracentesis with drainage of 700 mL of marija pleural fluid. Plan: Resume care by clinical team. Workstation performed: [id number]     XR chest 1 view portable    Result Date: 7/24/2024  Narrative: XR CHEST PORTABLE INDICATION: dyspnea. COMPARISON: CXR 6/27/2024, chest CT 12/02/2019. FINDINGS: Mild pulmonary venous congestion with moderate right and  "small left pleural effusion. Moderate bibasilar atelectasis. Chronic bilateral scar. Normal cardiomediastinal silhouette. Bones are unremarkable for age. Normal upper abdomen.     Impression: Mild pulmonary venous congestion with moderate right and small left pleural effusion. Workstation performed: [id number]     CT head without contrast    Result Date: 7/24/2024  Narrative: CT BRAIN - WITHOUT CONTRAST INDICATION:   confusion. COMPARISON: CT head 11/22/2011 TECHNIQUE:  CT examination of the brain was performed.  Multiplanar 2D reformatted images were created from the source data. Radiation dose length product (DLP) for this visit:  864.56 mGy-cm .  This examination, like all CT scans performed in the [facility], was performed utilizing techniques to minimize radiation dose exposure, including the use of iterative  reconstruction and automated exposure control. IMAGE QUALITY:  Diagnostic. FINDINGS: PARENCHYMA: Decreased attenuation is noted in periventricular and subcortical white matter demonstrating an appearance that is statistically most likely to represent progressive moderate microangiopathic change; this appearance is similar when compared to most recent prior examination. Chronic basal ganglia lacunar infarcts. No CT signs of acute infarction.  No intracranial mass, mass effect or midline shift.  No acute parenchymal hemorrhage. VENTRICLES AND EXTRA-AXIAL SPACES:  Normal for the patient's age. VISUALIZED ORBITS: Normal visualized orbits. PARANASAL SINUSES: Normal visualized paranasal sinuses. CALVARIUM AND EXTRACRANIAL SOFT TISSUES:  Normal.     Impression: No acute intracranial abnormality. Workstation performed: [id number]         EKG / Monitor: Personally reviewed.    Atrial fibrillation  Anterior infarct, age undetermined            \"This note was completed in part utilizing [employer] fluency direct voice recognition software.   Grammatical errors, random word insertion, spelling " "mistakes, and incomplete sentences may be an occasional consequence of the system secondary to software limitations, ambient noise and hardware issues.    Please read the chart carefully and recognize, using context, where substitutions have occurred.  If you have any questions or concerns about the context, text or information contained within the body of this dictation, please contact myself, the provider, for further clarification.\"  "

## 2024-07-26 NOTE — CASE MANAGEMENT
Case Management Discharge Planning Note    Patient name Monse Murray  Location 2 [facility] 201/2 [facility] 201 MRN [id number]  : 1933 Date 2024       Current Admission Date: 2024  Current Admission Diagnosis:Acute on chronic diastolic congestive heart failure (HCC)   Patient Active Problem List    Diagnosis Date Noted Date Diagnosed    Hypertension      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HCC)      Osteoporosis 2022     History of colon cancer 2021     On continuous oral anticoagulation 2021     Acute on chronic diastolic congestive heart failure (HCC) 2020     Lung nodule 2019     Paroxysmal atrial fibrillation (HCC) 2019     Bilateral pleural effusion 2019     Mediastinal adenopathy 2019     Renal cyst 2019     Thrombocytosis 2019     Macular degeneration 10/13/2017     Benign essential hypertension 2016     Hyperlipidemia 2016     Post-menopausal osteoporosis 2012       LOS (days): 2  Geometric Mean LOS (GMLOS) (days): 3.9  Days to GMLOS:2.1     OBJECTIVE:  Risk of Unplanned Readmission Score: 11.41     Current admission status: Inpatient   [facility]   [id number] - [place], [place] - [address] [place] [address] [facility]  [address] [address]  Phone: [phone] Fax: [phone]    Primary Care Provider: Beckie García MD    Primary Insurance: MEDICARE  Secondary Insurance: [facility]    DISCHARGE DETAILS:    Other Referral/Resources/Interventions Provided:  Interventions: Short Term Rehab  Referral Comments: SW provided pt and family with accepting facility list from [facility].  Reviewed options with family.  Family planning to consider options and maybe visit some of the facilities to determine choice.    Treatment Team Recommendation:  (therapy evaluations pending)  Discharge Destination Plan:: Short Term Rehab (per family request)  Transport at Discharge : Wheelchair van

## 2024-07-26 NOTE — CASE MANAGEMENT
Case Management Assessment & Discharge Planning Note    Patient name Monse Murray  Location [facility] [place] [facility] [place] 201 MRN [id number]  : 1933 Date 2024       Current Admission Date: 2024  Current Admission Diagnosis:Acute on chronic diastolic congestive heart failure (HCC)   Patient Active Problem List    Diagnosis Date Noted Date Diagnosed    Hypertension      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HCC)      Osteoporosis 2022     History of colon cancer 2021     On continuous oral anticoagulation 2021     Acute on chronic diastolic congestive heart failure (HCC) 2020     Lung nodule 2019     Paroxysmal atrial fibrillation (HCC) 2019     Pleural effusion on right 2019     Mediastinal adenopathy 2019     Renal cyst 2019     Thrombocytosis 2019     Macular degeneration 10/13/2017     Benign essential hypertension 2016     Hyperlipidemia 2016     Post-menopausal osteoporosis 2012       LOS (days): 2  Geometric Mean LOS (GMLOS) (days): 3.9  Days to GMLOS:2.3     OBJECTIVE:    Risk of Unplanned Readmission Score: 11.38     Current admission status: Inpatient    Preferred Pharmacy:   [id number] - [place], [place] - [address] [facility]  [address] [facility] [place] [address]  Phone: [phone] Fax: [phone]    Primary Care Provider: Beckie García MD    Primary Insurance: MEDICARE  Secondary Insurance: [facility] BLUE SHIELD    ASSESSMENT:  Active Health Care Proxies    There are no active Health Care Proxies on file.       Advance Directives  Does patient have a Health Care POA?: Yes  Does patient have Advance Directives?: Yes  Advance Directives: Power of  for health care, Living will (HCR/AMDD)  Primary Contact: Nils Murray    Readmission Root Cause  30 Day Readmission: No    Patient Information  Admitted from:: Home  Mental Status: Alert  During Assessment patient was accompanied by:  Other-Comment (Stepson and stepdaughter-in-law)  Assessment information provided by:: Patient, Other - please comment (Stepson and stepdaughter-in-law)  Primary Caregiver: Self  Support Systems: Self, Friends/neighbors, Other (Comment) (Stepson and stepdaughter-in-law)  County of Residence: [place]  What city do you live in?: [place]  Home entry access options. Select all that apply.: Stairs  Number of steps to enter home.: One Flight (14)  Do the steps have railings?: Yes  Type of Current Residence: Apartment  Floor Level: 2  Upon entering residence, is there a bedroom on the main floor (no further steps)?: Yes  Upon entering residence, is there a bathroom on the main floor (no further steps)?: Yes  Living Arrangements: Lives Alone    Activities of Daily Living Prior to Admission  Functional Status: Independent  Completes ADLs independently?: Yes  Ambulates independently?: Yes  Does patient use assisted devices?: No  Does patient currently own DME?: Yes  What DME does the patient currently own?: Straight Cane, Shower Chair  Does patient have a history of Outpatient Therapy (PT/OT)?: No  Does the patient have a history of Short-Term Rehab?: No  Does patient have a history of HHC?: No  Does patient currently have HHC?: No    Patient Information Continued  Income Source: Pension/FCI  Does patient have prescription coverage?: Yes ([facility])  Does patient receive dialysis treatments?: No  Does patient have a history of substance abuse?: No  Does patient have a history of Mental Health Diagnosis?: No    Means of Transportation  Means of Transport to Appts:: Friends      Social Determinants of Health (SDOH)      Flowsheet Row Most Recent Value   Housing Stability    In the last 12 months, was there a time when you were not able to pay the mortgage or rent on time? N   In the past 12 months, how many times have you moved where you were living? 0   At any time in the past 12 months, were you homeless or  living in a shelter (including now)? N   Transportation Needs    In the past 12 months, has lack of transportation kept you from medical appointments or from getting medications? no   In the past 12 months, has lack of transportation kept you from meetings, work, or from getting things needed for daily living? No   Food Insecurity    Within the past 12 months, you worried that your food would run out before you got the money to buy more. Never true   Within the past 12 months, the food you bought just didn't last and you didn't have money to get more. Never true   Utilities    In the past 12 months has the electric, gas, oil, or water company threatened to shut off services in your home? No            DISCHARGE DETAILS:    Discharge planning discussed with:: Patient, stepson and stepdaughter-in-law  Freedom of Choice: Yes  Comments - Freedom of Choice: SW met with pt and family to assess needs and discuss plans. Family inquired about possible STR placement due to concerns over pt's ability to climb steps at her apartment and manage safely at this time.  Family is in the process of making plans for pt to move out to [place] near them to an assisted living/personal care home.  PT/OT evaluations are pending.  SW offered to make blanket STR referrals to area facilities to determine availability.  List will be shared with pt and family when available.  SW will continue to follow to monitor progress and assist as needed.  CM contacted family/caregiver?: Yes  Were Treatment Team discharge recommendations reviewed with patient/caregiver?: Yes  Did patient/caregiver verbalize understanding of patient care needs?: Yes    Contacts  Patient Contacts: Nils/Sirena Murray  Relationship to Patient:: Family (stepson/stepdaughter-in-law)  Contact Method: In Person  Reason/Outcome: Discharge Planning    Requested Home Health Care         Is the patient interested in HHC at discharge?: No    DME Referral Provided  Referral made for DME?:  No    Other Referral/Resources/Interventions Provided:  Interventions: Short Term Rehab  Referral Comments: [place] STR referrals have been made    Treatment Team Recommendation:  (pending)  Discharge Destination Plan:: Short Term Rehab (per family request)  Transport at Discharge : Wheelchair van    IMM Given (Date):: 07/26/24  IMM Given to:: Family (IMM #2 reviewed with pt and family.  Family verbalized understanding. Stepson signed IMM and copy given. Copy also placed in scan bin for chart.)

## 2024-07-27 LAB
ANION GAP SERPL CALCULATED.3IONS-SCNC: 3 MMOL/L (ref 4–13)
BUN SERPL-MCNC: 16 MG/DL (ref 5–25)
CALCIUM SERPL-MCNC: 8 MG/DL (ref 8.4–10.2)
CHLORIDE SERPL-SCNC: 95 MMOL/L (ref 96–108)
CO2 SERPL-SCNC: 39 MMOL/L (ref 21–32)
CREAT SERPL-MCNC: 0.46 MG/DL (ref 0.6–1.3)
ERYTHROCYTE [DISTWIDTH] IN BLOOD BY AUTOMATED COUNT: 14.3 % (ref 11.6–15.1)
GFR SERPL CREATININE-BSD FRML MDRD: 87 ML/MIN/1.73SQ M
GLUCOSE SERPL-MCNC: 96 MG/DL (ref 65–140)
HCT VFR BLD AUTO: 48.2 % (ref 34.8–46.1)
HGB BLD-MCNC: 15.5 G/DL (ref 11.5–15.4)
MCH RBC QN AUTO: 31.4 PG (ref 26.8–34.3)
MCHC RBC AUTO-ENTMCNC: 32.2 G/DL (ref 31.4–37.4)
MCV RBC AUTO: 98 FL (ref 82–98)
PLATELET # BLD AUTO: 246 THOUSANDS/UL (ref 149–390)
PMV BLD AUTO: 10 FL (ref 8.9–12.7)
POTASSIUM SERPL-SCNC: 3.5 MMOL/L (ref 3.5–5.3)
RBC # BLD AUTO: 4.94 MILLION/UL (ref 3.81–5.12)
SODIUM SERPL-SCNC: 137 MMOL/L (ref 135–147)
WBC # BLD AUTO: 7.64 THOUSAND/UL (ref 4.31–10.16)

## 2024-07-27 PROCEDURE — 97535 SELF CARE MNGMENT TRAINING: CPT

## 2024-07-27 PROCEDURE — 97167 OT EVAL HIGH COMPLEX 60 MIN: CPT

## 2024-07-27 PROCEDURE — 97163 PT EVAL HIGH COMPLEX 45 MIN: CPT

## 2024-07-27 PROCEDURE — 99232 SBSQ HOSP IP/OBS MODERATE 35: CPT | Performed by: FAMILY MEDICINE

## 2024-07-27 PROCEDURE — 80048 BASIC METABOLIC PNL TOTAL CA: CPT | Performed by: FAMILY MEDICINE

## 2024-07-27 PROCEDURE — 85027 COMPLETE CBC AUTOMATED: CPT | Performed by: FAMILY MEDICINE

## 2024-07-27 PROCEDURE — 97530 THERAPEUTIC ACTIVITIES: CPT

## 2024-07-27 RX ADMIN — APIXABAN 2.5 MG: 2.5 TABLET, FILM COATED ORAL at 08:47

## 2024-07-27 RX ADMIN — APIXABAN 2.5 MG: 2.5 TABLET, FILM COATED ORAL at 17:44

## 2024-07-27 RX ADMIN — FUROSEMIDE 20 MG: 20 TABLET ORAL at 08:47

## 2024-07-27 RX ADMIN — METOPROLOL TARTRATE 25 MG: 25 TABLET, FILM COATED ORAL at 08:47

## 2024-07-27 RX ADMIN — METOPROLOL TARTRATE 25 MG: 25 TABLET, FILM COATED ORAL at 22:26

## 2024-07-27 RX ADMIN — EZETIMIBE 10 MG: 10 TABLET ORAL at 22:26

## 2024-07-27 RX ADMIN — PRAVASTATIN SODIUM 40 MG: 40 TABLET ORAL at 22:26

## 2024-07-27 NOTE — PHYSICAL THERAPY NOTE
"       PHYSICAL THERAPY EVALUATION/TREATMENT     07/27/24 1015   PT Last Visit   PT Visit Date 07/27/24   Note Type   Note type Evaluation   Pain Assessment   Pain Assessment Tool 0-10   Pain Score No Pain   Restrictions/Precautions   Weight Bearing Precautions Per Order No   Other Precautions Chair Alarm;Bed Alarm;Fall Risk;Visual impairment  (Macular degeneration)   Home Living   Type of Home Apartment   Home Layout One level;Performs ADLs on one level;Able to live on main level with bedroom/bathroom;Stairs to enter with rails  ([address] her apartment with one railing)   Bathroom Shower/Tub Tub/shower unit  (Pt sponge bathes only)   Home Equipment Cane   Additional Comments Pt states that she uses a SPC on occasion   Prior Function   Level of [place] Independent with ADLs;Independent with functional mobility;Needs assistance with IADLS   Lives With (S)  Alone   Receives Help From Friend(s);Family  (Family is not close; sister is 95; others live in [place];)   IADLs Family/Friend/Other provides transportation;Independent with medication management;Independent with meal prep  (frozen dinners; does not prepare meals anymore.)   Falls in the last 6 months 0   General   Additional Pertinent History Pt is a 89 year old female admitted with AMS>>CHF.   Family/Caregiver Present No  (however son and daugher in law entered at end of session)   Cognition   Overall Cognitive Status WFL   Arousal/Participation Cooperative   Orientation Level Oriented X4   Following Commands Follows multistep commands with increased time or repetition   Subjective   Subjective Pt reports that freinds from [employer] have been helping her with transportation, however this is becoming more difficult due to \"they are getting older too\".   Pt has much concern about falling due to the amount of stairs to get in/out of her apartment.   RLE Assessment   RLE Assessment WFL  (strength: grossly at least 4/5)   LLE Assessment   LLE Assessment WFL  (strength: " grossly at least 4/5)   Bed Mobility   Supine to Sit Unable to assess   Additional Comments Pt presents in bedside chair   Transfers   Sit to Stand 4  Minimal assistance   Additional items Assist x 1;Verbal cues   Stand to Sit 4  Minimal assistance   Additional items Assist x 1;Verbal cues   Stand pivot 4  Minimal assistance   Additional items Assist x 1;Verbal cues   Ambulation/Elevation   Gait pattern Short stride;Foward flexed   Gait Assistance 4  Minimal assist   Additional items Assist x 1;Verbal cues   Assistive Device Rolling walker   Distance 20 feet   Stair Management Assistance 4  Minimal assist   Additional items Assist x 1;Verbal cues   Stair Management Technique Two rails;Alternating pattern;Step to pattern  (Alternating pattern to ascend;  step to pattern to descend.)   Ambulation/Elevation Additional Comments Pt does not use AD on a regular basis, however, pt is more steady with a RW at this time.   Balance   Static Sitting Good   Dynamic Sitting Fair +   Static Standing Fair   Dynamic Standing Fair   Ambulatory Fair  (with RW)   Activity Tolerance   Activity Tolerance Patient tolerated treatment well;Patient limited by fatigue   Medical Staff Made Aware Sary QUINONES RN   Nurse Made Aware yes   Assessment   Prognosis Good   Problem List Decreased strength;Decreased endurance;Impaired balance;Decreased mobility   Assessment Patient seen for Physical Therapy evaluation. Patient admitted with Acute on chronic diastolic congestive heart failure (HCC). Comorbidities affecting patient's physical performance include: macular degeneration , A fib, colon cancer, HTN.Personal factors affecting patient at time of initial evaluation include: lives in single story house, ambulating with assistive device, stairs to enter home, inability to ambulate household distances, inability to navigate level surfaces without external assistance, limited home support, visual impairments, inability to perform ADLS, and inability  to perform IADLS . Prior to admission, patient was independent with functional mobility with occasional use of SPC, independent with ADLS, requiring assist for IADLS, living alone in single story home with 13 steps to enter, and ambulating household distance. Please find objective findings from Physical Therapy assessment regarding body systems outlined above with impairments and limitations including weakness, impaired balance, decreased endurance, gait deviations, decreased activity tolerance, and fall risk. The Barthel Index was used as a functional outcome tool presenting with a score of Barthel Index Score: 50 today indicating marked limitations of functional mobility and ADLS. Patient's clinical presentation is currently unstable/unpredictable as seen in patient's presentation of vital sign response, increased fall risk, new onset of impairment of functional mobility, and decreased endurance. Pt would benefit from continued Physical Therapy treatment to address deficits as defined above and maximize level of functional mobility. As demonstrated by objective findings, the assigned level of complexity for this evaluation is high.The patient's -[facility] Basic Mobility Inpatient Short Form Raw Score is 18. A Raw score of greater than 16 suggests the patient may benefit from discharge to home, however pt is not at her baseline of function and has 13 stairs to enter her dwelling. Would highly recommend STR.Please also refer to the recommendation of the Physical Therapist for safe discharge planning.   Goals   Patient Goals to be able to find a place to live without stairs   STG Expiration Date 09/07/24   Short Term Goal #1 Indep transfers supine <> short sit without use of rails and with bed flat;   Indep sit <> stand with use of LRAD   Short Term Goal #2 Supervision for amb. with LRAD for functional household distance with good balance   LTG Expiration Date 08/10/24   Long Term Goal #1 Indep amb. with LRAD with good  balance   Long Term Goal #2 Indep amb. on stairs with one railing and good balance.   [facility]: 24/24 to demonstrate indepence with functional mobility   Plan   Treatment/Interventions ADL retraining;Functional transfer training;Elevations;Therapeutic exercise;Endurance training;Patient/family training;Equipment eval/education;Bed mobility;Gait training;Spoke to nursing;OT;Family   PT Frequency 3-5x/wk   Discharge Recommendation   Rehab Resource Intensity Level, PT II (Moderate Resource Intensity)   AM-PAC Basic Mobility Inpatient   Turning in Flat Bed Without Bedrails 4   Lying on Back to Sitting on Edge of Flat Bed Without Bedrails 3   Moving Bed to Chair 3   Standing Up From Chair Using Arms 3   Walk in Room 3   Climb 3-5 Stairs With Railing 2   Basic Mobility Inpatient Raw Score 18   Basic Mobility Standardized Score 41.05   [facility] Highest Level Of Mobility   -HLM Goal 6: Walk 10 steps or more   -HLM Achieved 7: Walk 25 feet or more   Barthel Index   Feeding 10   Bathing 0   Grooming Score 0   Dressing Score 5   Bladder Score 5   Bowels Score 10   Toilet Use Score 5   Transfers (Bed/Chair) Score 10   Mobility (Level Surface) Score 0   Stairs Score 5   Barthel Index Score 50   Additional Treatment Session   Start Time 1005   End Time 1015   Treatment Assessment Pt used a RW to ambulate into hallway with Min A.  Pt walked 100 feet x 2 .  Pt navigated 4 stairs with two railings and Min A and verbal and tactile cues.  Pt positioned in chair at end of session with legs elevated.  A: Tolerated fairly well.  Pt has 13 stairs to enter home which is very risky for this pt.  P: Would highly recommend [facility].   Equipment Use RW   End of Consult   Patient Position at End of Consult Bedside chair;Bed/Chair alarm activated;All needs within reach   End of Consult Comments family at bedside   Licensure   [place] License Number  Elisabeth Lazcano PT  [id number]

## 2024-07-27 NOTE — PLAN OF CARE
Problem: Potential for Falls  Goal: Patient will remain free of falls  Description: INTERVENTIONS:  - Educate patient/family on patient safety including physical limitations  - Instruct patient to call for assistance with activity   - Consult OT/PT to assist with strengthening/mobility   - Keep Call bell within reach  - Keep bed low and locked with side rails adjusted as appropriate  - Keep care items and personal belongings within reach  - Initiate and maintain comfort rounds  - Make Fall Risk Sign visible to staff  - Offer Toileting every 2 Hours, in advance of need  - Initiate/Maintain bed/chair alarm    - Apply yellow socks and bracelet for high fall risk patients  - Consider moving patient to room near nurses station  Outcome: Progressing     Problem: PAIN - ADULT  Goal: Verbalizes/displays adequate comfort level or baseline comfort level  Description: Interventions:  - Encourage patient to monitor pain and request assistance  - Assess pain using appropriate pain scale  - Administer analgesics based on type and severity of pain and evaluate response  - Implement non-pharmacological measures as appropriate and evaluate response  - Consider cultural and social influences on pain and pain management  - Notify physician/advanced practitioner if interventions unsuccessful or patient reports new pain  Outcome: Progressing     Problem: SAFETY ADULT  Goal: Patient will remain free of falls  Description: INTERVENTIONS:  - Educate patient/family on patient safety including physical limitations  - Instruct patient to call for assistance with activity   - Consult OT/PT to assist with strengthening/mobility   - Keep Call bell within reach  - Keep bed low and locked with side rails adjusted as appropriate  - Keep care items and personal belongings within reach  - Initiate and maintain comfort rounds  - Make Fall Risk Sign visible to staff  - Offer Toileting every 2 Hours, in advance of need  - Initiate/Maintain bed/chair  alarm    - Apply yellow socks and bracelet for high fall risk patients  - Consider moving patient to room near nurses station  Outcome: Progressing  Goal: Maintain or return to baseline ADL function  Description: INTERVENTIONS:  -  Assess patient's ability to carry out ADLs; assess patient's baseline for ADL function and identify physical deficits which impact ability to perform ADLs (bathing, care of mouth/teeth, toileting, grooming, dressing, etc.)  - Assess/evaluate cause of self-care deficits   - Assess range of motion  - Assess patient's mobility; develop plan if impaired  - Assess patient's need for assistive devices and provide as appropriate  - Encourage maximum independence but intervene and supervise when necessary  - Involve family in performance of ADLs  - Assess for home care needs following discharge   - Consider OT consult to assist with ADL evaluation and planning for discharge  - Provide patient education as appropriate  Outcome: Progressing  Goal: Maintains/Returns to pre admission functional level  Description: INTERVENTIONS:  - Perform AM-PAC 6 Click Basic Mobility/ Daily Activity assessment daily.  - Set and communicate daily mobility goal to care team and patient/family/caregiver.   - Collaborate with rehabilitation services on mobility goals if consulted  - Perform Range of Motion 2 times a day.  - Reposition patient every 2 hours.  - Dangle patient 2 times a day  - Stand patient 2 times a day  - Ambulate patient 2 times a day  - Out of bed to chair 2 times a day   - Out of bed for meals 2 times a day  - Out of bed for toileting  - Record patient progress and toleration of activity level   Outcome: Progressing     Problem: DISCHARGE PLANNING  Goal: Discharge to home or other facility with appropriate resources  Description: INTERVENTIONS:  - Identify barriers to discharge w/patient and caregiver  - Arrange for needed discharge resources and transportation as appropriate  - Identify discharge  learning needs (meds, wound care, etc.)  - Arrange for interpretive services to assist at discharge as needed  - Refer to Case Management Department for coordinating discharge planning if the patient needs post-hospital services based on physician/advanced practitioner order or complex needs related to functional status, cognitive ability, or social support system  Outcome: Progressing     Problem: Knowledge Deficit  Goal: Patient/family/caregiver demonstrates understanding of disease process, treatment plan, medications, and discharge instructions  Description: Complete learning assessment and assess knowledge base.  Interventions:  - Provide teaching at level of understanding  - Provide teaching via preferred learning methods  Outcome: Progressing     Problem: CARDIOVASCULAR - ADULT  Goal: Maintains optimal cardiac output and hemodynamic stability  Description: INTERVENTIONS:  - Monitor I/O, vital signs and rhythm  - Monitor for S/S and trends of decreased cardiac output  - Administer and titrate ordered vasoactive medications to optimize hemodynamic stability  - Assess quality of pulses, skin color and temperature  - Assess for signs of decreased coronary artery perfusion  - Instruct patient to report change in severity of symptoms  Outcome: Progressing  Goal: Absence of cardiac dysrhythmias or at baseline rhythm  Description: INTERVENTIONS:  - Continuous cardiac monitoring, vital signs, obtain 12 lead EKG if ordered  - Administer antiarrhythmic and heart rate control medications as ordered  - Monitor electrolytes and administer replacement therapy as ordered  Outcome: Progressing     Problem: METABOLIC, FLUID AND ELECTROLYTES - ADULT  Goal: Electrolytes maintained within normal limits  Description: INTERVENTIONS:  - Monitor labs and assess patient for signs and symptoms of electrolyte imbalances  - Administer electrolyte replacement as ordered  - Monitor response to electrolyte replacements, including repeat lab  results as appropriate  - Instruct patient on fluid and nutrition as appropriate  Outcome: Progressing  Goal: Fluid balance maintained  Description: INTERVENTIONS:  - Monitor labs   - Monitor I/O and WT  - Instruct patient on fluid and nutrition as appropriate  - Assess for signs & symptoms of volume excess or deficit  Outcome: Progressing     Problem: Prexisting or High Potential for Compromised Skin Integrity  Goal: Skin integrity is maintained or improved  Description: INTERVENTIONS:  - Identify patients at risk for skin breakdown  - Assess and monitor skin integrity  - Assess and monitor nutrition and hydration status  - Monitor labs   - Assess for incontinence   - Turn and reposition patient  - Assist with mobility/ambulation  - Relieve pressure over bony prominences  - Avoid friction and shearing  - Provide appropriate hygiene as needed including keeping skin clean and dry  - Evaluate need for skin moisturizer/barrier cream  - Collaborate with interdisciplinary team   - Patient/family teaching  - Consider wound care consult   Outcome: Progressing

## 2024-07-27 NOTE — PLAN OF CARE
Problem: Potential for Falls  Goal: Patient will remain free of falls  Description: INTERVENTIONS:  - Educate patient/family on patient safety including physical limitations  - Instruct patient to call for assistance with activity   - Consult OT/PT to assist with strengthening/mobility   - Keep Call bell within reach  - Keep bed low and locked with side rails adjusted as appropriate  - Keep care items and personal belongings within reach  - Initiate and maintain comfort rounds  - Make Fall Risk Sign visible to staff  - Offer Toileting every 2 Hours, in advance of need  - Initiate/Maintain bed/chair alarm    - Apply yellow socks and bracelet for high fall risk patients  - Consider moving patient to room near nurses station  7/27/2024 0233 by Loly Gupta RN  Outcome: Progressing  7/27/2024 0219 by Loly Gupta, JESUS  Outcome: Progressing     Problem: SAFETY ADULT  Goal: Patient will remain free of falls  Description: INTERVENTIONS:  - Educate patient/family on patient safety including physical limitations  - Instruct patient to call for assistance with activity   - Consult OT/PT to assist with strengthening/mobility   - Keep Call bell within reach  - Keep bed low and locked with side rails adjusted as appropriate  - Keep care items and personal belongings within reach  - Initiate and maintain comfort rounds  - Make Fall Risk Sign visible to staff  - Offer Toileting every 2 Hours, in advance of need  - Initiate/Maintain bed/chair alarm    - Apply yellow socks and bracelet for high fall risk patients  - Consider moving patient to room near nurses station  7/27/2024 0233 by Loly Gupta, JSEUS  Outcome: Progressing  7/27/2024 0219 by Loly Gupta RN  Outcome: Progressing     Problem: DISCHARGE PLANNING  Goal: Discharge to home or other facility with appropriate resources  Description: INTERVENTIONS:  - Identify barriers to discharge w/patient and caregiver  - Arrange for needed  discharge resources and transportation as appropriate  - Identify discharge learning needs (meds, wound care, etc.)  - Arrange for interpretive services to assist at discharge as needed  - Refer to Case Management Department for coordinating discharge planning if the patient needs post-hospital services based on physician/advanced practitioner order or complex needs related to functional status, cognitive ability, or social support system  Outcome: Progressing     Problem: Knowledge Deficit  Goal: Patient/family/caregiver demonstrates understanding of disease process, treatment plan, medications, and discharge instructions  Description: Complete learning assessment and assess knowledge base.  Interventions:  - Provide teaching at level of understanding  - Provide teaching via preferred learning methods  Outcome: Progressing     Problem: CARDIOVASCULAR - ADULT  Goal: Maintains optimal cardiac output and hemodynamic stability  Description: INTERVENTIONS:  - Monitor I/O, vital signs and rhythm  - Monitor for S/S and trends of decreased cardiac output  - Administer and titrate ordered vasoactive medications to optimize hemodynamic stability  - Assess quality of pulses, skin color and temperature  - Assess for signs of decreased coronary artery perfusion  - Instruct patient to report change in severity of symptoms  Outcome: Progressing  Goal: Absence of cardiac dysrhythmias or at baseline rhythm  Description: INTERVENTIONS:  - Continuous cardiac monitoring, vital signs, obtain 12 lead EKG if ordered  - Administer antiarrhythmic and heart rate control medications as ordered  - Monitor electrolytes and administer replacement therapy as ordered  Outcome: Progressing     Problem: METABOLIC, FLUID AND ELECTROLYTES - ADULT  Goal: Electrolytes maintained within normal limits  Description: INTERVENTIONS:  - Monitor labs and assess patient for signs and symptoms of electrolyte imbalances  - Administer electrolyte replacement as  ordered  - Monitor response to electrolyte replacements, including repeat lab results as appropriate  - Instruct patient on fluid and nutrition as appropriate  Outcome: Progressing  Goal: Fluid balance maintained  Description: INTERVENTIONS:  - Monitor labs   - Monitor I/O and WT  - Instruct patient on fluid and nutrition as appropriate  - Assess for signs & symptoms of volume excess or deficit  Outcome: Progressing     Problem: Prexisting or High Potential for Compromised Skin Integrity  Goal: Skin integrity is maintained or improved  Description: INTERVENTIONS:  - Identify patients at risk for skin breakdown  - Assess and monitor skin integrity  - Assess and monitor nutrition and hydration status  - Monitor labs   - Assess for incontinence   - Turn and reposition patient  - Assist with mobility/ambulation  - Relieve pressure over bony prominences  - Avoid friction and shearing  - Provide appropriate hygiene as needed including keeping skin clean and dry  - Evaluate need for skin moisturizer/barrier cream  - Collaborate with interdisciplinary team   - Patient/family teaching  - Consider wound care consult   Outcome: Progressing

## 2024-07-27 NOTE — ASSESSMENT & PLAN NOTE
Patient presented with dyspnea on exertion with associated mild chest pain during SOB episodes.    Was on HCTZ 25 mg p.o. daily in the past.  Not currently on home med list.  2D echo in 2019 showed normal EF around 60%, elevated mean left atrial filling pressure, no significant valvular disease.    Given Lasix 40 mg IV in ED with good response.  Was on Lasix 40 mg IV daily, transition to 20 mg of Lasix daily  2D echo -EF normal and abnormal diastolic function.  Left atrium is moderately dilated.  Right ventricular systolic pressure is mild to moderately elevated at 50 mmHg  Cardiac diet with fluid restriction 1800 cc/day  Daily weight, intake output  Continue Lopressor.  Appreciate cardiology input

## 2024-07-27 NOTE — ASSESSMENT & PLAN NOTE
Patient was treated with pneumonia in end of April and beginning of May with doxycycline for 7 days and Levaquin for 7 days respectively.  Chest x-ray in end of April showed lateral segment right middle lobe atelectasis and or infiltrate,small right effusion.   X-ray showed venous congestion with moderate right and small left pleural effusion  Status post right-sided thoracentesis with removal of 700 mL of marija pleural fluid by IR on 7/25   Fluid culture with no growth. serum LDH -normal at 215

## 2024-07-27 NOTE — OCCUPATIONAL THERAPY NOTE
"  Occupational Therapy Evaluation/Treatment       07/27/24 0945   OT Last Visit   OT Visit Date 07/27/24   Note Type   Note type Evaluation   Pain Assessment   Pain Assessment Tool 0-10   Pain Score No Pain   Restrictions/Precautions   Weight Bearing Precautions Per Order No   Other Precautions Chair Alarm;Bed Alarm;Fall Risk;Visual impairment   Home Living   Type of Home Apartment   Home Layout One level;Performs ADLs on one level;Able to live on main level with bedroom/bathroom;Stairs to enter with rails   Bathroom Shower/Tub Tub/shower unit  (pt reports sponge bathing at baseline)   Bathroom Toilet Standard   Bathroom Equipment Grab bars in shower   Home Equipment Cane   Additional Comments pt reports ambulating with cane as needed   Prior Function   Level of [place] Independent with ADLs;Independent with functional mobility;Needs assistance with IADLS   Lives With (S)  Alone   Receives Help From Family;Friend(s)  (frankie and daughter-in-law who live in [place], working on moving her into detention near them)   IADLs Family/Friend/Other provides transportation;Independent with meal prep;Independent with medication management  (pt reports minimal cooking, mainly uses frozen dinners)   Falls in the last 6 months 0   Vocational Retired   Comments Pt reports having friends from [employer] who assist her with transportation however this is becoming more challening as pt states \"they are getting older too.\" Pt concerned about number of stairs to enter her apartment as she is having trouble managing them. Pt not safe to return home alone.   General   Additional Pertinent History Pt admitted with worsening SOB and mild chest pain, dx with acute on chronic diastolic congestive heart failure   Subjective   Subjective Pt agreeable to OT evaluation   ADL   Eating Assistance 7  Independent   Grooming Assistance 5  Supervision/Setup   UB Bathing Assistance 5  Supervision/Setup   LB Bathing Assistance 4  Minimal Assistance   UB " Dressing Assistance 5  Supervision/Setup   LB Dressing Assistance 4  Minimal Assistance   Toileting Assistance  5  Supervision/Setup   Bed Mobility   Supine to Sit Unable to assess   Sit to Supine Unable to assess   Additional Comments pt sitting OOB in recliner upon OT arrival and returned to chair at end of session   Transfers   Sit to Stand 4  Minimal assistance   Additional items Assist x 1;Verbal cues   Stand to Sit 4  Minimal assistance   Additional items Assist x 1;Verbal cues   Toilet transfer 4  Minimal assistance   Additional items Assist x 1;Verbal cues;Standard toilet   Functional Mobility   Functional Mobility 4  Minimal assistance   Additional Comments engaged in fxl mobility short distances using RW and min A   Additional items Rolling walker   Balance   Static Sitting Good   Dynamic Sitting Fair +   Static Standing Fair   Dynamic Standing Fair   Activity Tolerance   Activity Tolerance Patient tolerated treatment well;Patient limited by fatigue   RUE Assessment   RUE Assessment WFL   LUE Assessment   LUE Assessment WFL   Vision-Basic Assessment   Current Vision Wears glasses all the time   Visual History Macular degeneration   Cognition   Overall Cognitive Status WFL   Arousal/Participation Alert;Cooperative   Attention Within functional limits   Orientation Level Oriented X4   Memory Within functional limits   Following Commands Follows multistep commands with increased time or repetition   Assessment   Limitation Decreased ADL status;Decreased UE strength;Decreased endurance;Decreased self-care trans;Decreased high-level ADLs  (decreased balance and mobility)   Prognosis Good   Assessment Patient evaluated by Occupational Therapy.  Patient admitted with Acute on chronic diastolic congestive heart failure (HCC).  The patients occupational profile, medical and therapy history includes a extensive additional review of physical, cognitive, or psychosocial history related to current functional  performance.  Comorbidities affecting functional mobility and ADLS include: macular degeneration, a-fib, colon CA, HTN.  Prior to admission, patient was independent with functional mobility with cane as needed, independent with ADLS, and requiring assist for IADLS.  The evaluation identifies the following performance deficits: weakness, impaired balance, decreased endurance, increased fall risk, new onset of impairment of functional mobility, decreased ADLS, decreased IADLS, decreased activity tolerance, and decreased strength, that result in activity limitations and/or participation restrictions. This evaluation requires clinical decision making of high complexity, because the patient presents with comorbidites that affect occupational performance and required significant modification of tasks or assistance with consideration of multiple treatment options.  The Barthel Index was used as a functional outcome tool presenting with a score of Barthel Index Score: 50, indicating marked limitations of functional mobility and ADLS.  The patient's raw score on the AM-PAC Daily Activity Inpatient Short Form is 19. A raw score of greater than or equal to 19 suggests the patient may benefit from discharge to home however pt is below her baseline level of function and is not safe to return home alone at this time. Recommend post-acute rehabilitation services. Please refer to the recommendation of the Occupational Therapist for safe discharge planning. Patient will benefit from skilled Occupational Therapy services to address above deficits and facilitate a safe return to prior level of function.   Goals   Patient Goals to get stronger   STG Time Frame   (1-7 days)   Short Term Goal  Goals established to promote Patient Goals: to be able to find a place to live without stairs: Grooming: independent standing at sink; Bathing: supervision; Upper Body Dressing independent; Lower Body Dressing: supervision; Toileting: independent;  Patient will increase ambulatory standard toilet transfer to supervision with least restrictive assistive device to increase performance and safety with ADLS and functional mobility; Patient will increase standing tolerance to 5 minutes during ADL task to decrease assistance level and decrease fall risk; Patient will increase bed mobility to independent in preparation for ADLS and transfers; Patient will increase functional mobility to and from bathroom with least restrictive assistive device with supervision to increase performance with ADLS and to use a toilet; Patient will tolerate 5 minutes of UE ROM/strengthening to increase general activity tolerance and performance in ADLS/IADLS; Patient will improve functional activity tolerance to 5 minutes of sustained functional tasks to increase participation in basic self-care and decrease assistance level;  Patient will be able to to verbalize understanding and perform energy conservation/proper body mechanics during ADLS and functional mobility at least 75% of the time with minimal cueing to decrease signs of fatigue and increase stamina to return to prior level of function; Patient will increase dynamic sitting balance to good to improve the ability to sit at edge of bed or on a chair for ADLS;  Patient will increase dynamic standing balance to fair+ to improve postural stability and decrease fall risk during standing ADLS and transfers.   LTG Time Frame   (8-14 days)   Long Term Goal Bathing: independent; Lower Body Dressing: independent; Patient will increase ambulatory standard toilet transfer to independent with least restrictive assistive device to increase performance and safety with ADLS and functional mobility; Patient will increase standing tolerance to 10 minutes during ADL task to decrease assistance level and decrease fall risk; Patient will increase functional mobility to and from bathroom with least restrictive assistive device independently to increase  performance with ADLS and to use a toilet; Patient will tolerate 10 minutes of UE ROM/strengthening to increase general activity tolerance and performance in ADLS/IADLS; Patient will improve functional activity tolerance to 10 minutes of sustained functional tasks to increase participation in basic self-care and decrease assistance level;  Patient will be able to to verbalize understanding and perform energy conservation/proper body mechanics during ADLS and functional mobility at least 90% of the time with no cueing to decrease signs of fatigue and increase stamina to return to prior level of function; Patient will increase dynamic standing balance to good to improve postural stability and decrease fall risk during standing ADLS and transfers.  Pt will score >/= 23/24 on AM-PAC Daily Activity Inpatient scale to promote safe independence with ADLs and functional mobility; Pt will score >/= 80/100 on Barthel Index in order to decrease caregiver assistance needed and increase ability to perform ADLs and functional mobility.   Plan   Treatment Interventions ADL retraining;Functional transfer training;Endurance training;Patient/family training;Equipment evaluation/education;Activityengagement;Compensatory technique education;UE strengthening/ROM;Energy conservation   Goal Expiration Date 08/10/24   OT Frequency 3-5x/wk   Discharge Recommendation   Rehab Resource Intensity Level, OT II (Moderate Resource Intensity)   AM-PAC Daily Activity Inpatient   Lower Body Dressing 3   Bathing 3   Toileting 3   Upper Body Dressing 3   Grooming 3   Eating 4   Daily Activity Raw Score 19   Daily Activity Standardized Score (Calc for Raw Score >=11) 40.22   AM-PAC Applied Cognition Inpatient   Following a Speech/Presentation 3   Understanding Ordinary Conversation 4   Taking Medications 4   Remembering Where Things Are Placed or Put Away 4   Remembering List of 4-5 Errands 4   Taking Care of Complicated Tasks 3   Applied Cognition Raw  Score 22   Applied Cognition Standardized Score 47.83   Barthel Index   Feeding 10   Bathing 0   Grooming Score 0   Dressing Score 5   Bladder Score 5   Bowels Score 10   Toilet Use Score 5   Transfers (Bed/Chair) Score 10   Mobility (Level Surface) Score 0   Stairs Score 5   Barthel Index Score 50   Additional Treatment Session   Start Time 0935   End Time 0945   Treatment Assessment S: denies pain. O: Additional fxl mobility within room and bathroom and in hallway using RW and min A. Completed standard toilet transfer using RW +grab bar. Supervision for toileting and for washing hands at sink. Sit<>stand with min A and RW. A: Pt tolerated OT session well, limited by mild fatigue. Pt is generally weak and deconditioned impacting fxl performance. P: Pt would benefit from cont OT services to maximize safety and fxl performance of ADLs.   Licensure   [place] License Number  Estefany Merchant OTR/L [id number]

## 2024-07-27 NOTE — PROGRESS NOTES
[facility]  Progress Note  Name: Monse Murray I  MRN: [id number]  Unit/Bed#: 2 [address] Date of Admission: 7/24/2024   Date of Service: 7/27/2024 I Hospital Day: 3    Assessment & Plan   * Acute on chronic diastolic congestive heart failure (HCC)  Assessment & Plan  Patient presented with dyspnea on exertion with associated mild chest pain during SOB episodes.    Was on HCTZ 25 mg p.o. daily in the past.  Not currently on home med list.  2D echo in 2019 showed normal EF around 60%, elevated mean left atrial filling pressure, no significant valvular disease.    Given Lasix 40 mg IV in ED with good response.  Was on Lasix 40 mg IV daily, transition to 20 mg of Lasix daily  2D echo -EF normal and abnormal diastolic function.  Left atrium is moderately dilated.  Right ventricular systolic pressure is mild to moderately elevated at 50 mmHg  Cardiac diet with fluid restriction 1800 cc/day  Daily weight, intake output  Continue Lopressor.  Appreciate cardiology input          Bilateral pleural effusion  Assessment & Plan  Patient was treated with pneumonia in end of April and beginning of May with doxycycline for 7 days and Levaquin for 7 days respectively.  Chest x-ray in end of April showed lateral segment right middle lobe atelectasis and or infiltrate,small right effusion.   X-ray showed venous congestion with moderate right and small left pleural effusion  Status post right-sided thoracentesis with removal of 700 mL of marija pleural fluid by IR on 7/25   Fluid culture with no growth. serum LDH -normal at 215    Hypertension  Assessment & Plan  Continue metoprolol.  BP well-controlled.    History of colon cancer  Assessment & Plan  Status post treatment.    Paroxysmal atrial fibrillation (HCC)  Assessment & Plan  On Eliquis, metoprolol which is being continued  EKG controlled A-fib    Hyperlipidemia  Assessment & Plan  Continue Zetia, statin    Hyponatremia-resolved as of  2024  Assessment & Plan  Sodium 132, initially now resolved   Suspect due to volume overload  IV Lasix as above               VTE Pharmacologic Prophylaxis:    eliquis    Mobility:   Basic Mobility Inpatient Raw Score: 18  JH-HLM Goal: 6: Walk 10 steps or more  JH-HLM Achieved: 7: Walk 25 feet or more  JH-HLM Goal achieved. Continue to encourage appropriate mobility.    Patient Centered Rounds: I performed bedside rounds with nursing staff today.   Discussions with Specialists or Other Care Team Provider: yes - cardio    Education and Discussions with Family / Patient: Updated  (frankie and his wife) at bedside.    Total Time Spent on Date of Encounter in care of patient:  This time was spent on one or more of the following: performing physical exam; counseling and coordination of care; obtaining or reviewing history; documenting in the medical record; reviewing/ordering tests, medications or procedures; communicating with other healthcare professionals and discussing with patient's family/caregivers.    Current Length of Stay: 3 day(s)  Current Patient Status: Inpatient   Certification Statement: The patient will continue to require additional inpatient hospital stay due to CHF  Discharge Plan: Anticipate discharge in 24-48 hrs to rehab facility.    Code Status: Level 3 - DNAR and DNI    Subjective:     Patient states she is doing okay.  Denies any significant shortness of breath.  Feels better compared to before    Objective:     Vitals:   Temp (24hrs), Av.8 °F (36.6 °C), Min:97.5 °F (36.4 °C), Max:98.3 °F (36.8 °C)    Temp:  [97.5 °F (36.4 °C)-98.3 °F (36.8 °C)] 98.3 °F (36.8 °C)  HR:  [62-84] 74  Resp:  [18-20] 20  BP: ()/(52-75) 114/71  SpO2:  [90 %-92 %] 90 %  Body mass index is 20.3 kg/m².     Input and Output Summary (last 24 hours):     Intake/Output Summary (Last 24 hours) at 2024 1448  Last data filed at 2024 0848  Gross per 24 hour   Intake 10 ml   Output 125 ml    Net -115 ml       Physical Exam:   Physical Exam  Constitutional:       General: She is not in acute distress.     Appearance: She is not toxic-appearing.   HENT:      Head: Normocephalic and atraumatic.   Eyes:      General: No scleral icterus.  Cardiovascular:      Rate and Rhythm: Normal rate. Rhythm irregular.   Pulmonary:      Effort: Pulmonary effort is normal. No respiratory distress.      Breath sounds: Normal breath sounds. No wheezing or rales.   Abdominal:      General: Bowel sounds are normal. There is no distension.      Palpations: Abdomen is soft.      Tenderness: There is no abdominal tenderness.   Musculoskeletal:      Right lower leg: Edema present.      Left lower leg: Edema present.   Neurological:      Mental Status: She is alert.          Additional Data:     Labs:  Results from last 7 days   Lab Units 07/27/24  0456 07/25/24  0609 07/24/24  1623   WBC Thousand/uL 7.64   < > 8.83   HEMOGLOBIN g/dL 15.5*   < > 15.9*   HEMATOCRIT % 48.2*   < > 49.6*   PLATELETS Thousands/uL 246   < > 291   SEGS PCT %  --   --  85*   LYMPHO PCT %  --   --  5*   MONO PCT %  --   --  9   EOS PCT %  --   --  0    < > = values in this interval not displayed.     Results from last 7 days   Lab Units 07/27/24  0456 07/26/24  0435   SODIUM mmol/L 137 135   POTASSIUM mmol/L 3.5 4.0   CHLORIDE mmol/L 95* 95*   CO2 mmol/L 39* 38*   BUN mg/dL 16 18   CREATININE mg/dL 0.46* 0.61   ANION GAP mmol/L 3* 2*   CALCIUM mg/dL 8.0* 8.1*   ALBUMIN g/dL  --  3.0*   TOTAL BILIRUBIN mg/dL  --  1.45*   ALK PHOS U/L  --  45   ALT U/L  --  16   AST U/L  --  24   GLUCOSE RANDOM mg/dL 96 100         Results from last 7 days   Lab Units 07/25/24  0704   POC GLUCOSE mg/dl 94               Lines/Drains:  Invasive Devices       Peripheral Intravenous Line  Duration             Peripheral IV 07/24/24 Right Antecubital 2 days                          Imaging: No pertinent imaging reviewed.    Recent Cultures (last 7 days):   Results from last 7  days   Lab Units 07/25/24  0825 07/24/24  1721   GRAM STAIN RESULT  1+ Polys  No organisms seen  --    URINE CULTURE   --  50,000-59,000 cfu/ml   BODY FLUID CULTURE, STERILE  No growth  --        Last 24 Hours Medication List:   Current Facility-Administered Medications   Medication Dose Route Frequency Provider Last Rate    acetaminophen  650 mg Oral Q6H PRN NILO Wilder      apixaban  2.5 mg Oral BID NILO Wilder      ezetimibe  10 mg Oral HS NILO Wilder      And    pravastatin  40 mg Oral HS NILO Wilder      furosemide  20 mg Oral Daily NILO Keenan      metoprolol tartrate  25 mg Oral Q12H [facility] NILO Wilder          Today, Patient Was Seen By: Patricia Rosario DO    **Please Note: This note may have been constructed using a voice recognition system.**

## 2024-07-27 NOTE — CASE MANAGEMENT
Case Management Discharge Planning Note    Patient name Monse Murray  Location 2 [facility] 201/2 [facility] 201 MRN [id number]  : 1933 Date 2024       Current Admission Date: 2024  Current Admission Diagnosis:Acute on chronic diastolic congestive heart failure (HCC)   Patient Active Problem List    Diagnosis Date Noted Date Diagnosed    Hypertension      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HCC)      Osteoporosis 2022     History of colon cancer 2021     On continuous oral anticoagulation 2021     Acute on chronic diastolic congestive heart failure (HCC) 2020     Lung nodule 2019     Paroxysmal atrial fibrillation (HCC) 2019     Bilateral pleural effusion 2019     Mediastinal adenopathy 2019     Renal cyst 2019     Thrombocytosis 2019     Macular degeneration 10/13/2017     Benign essential hypertension 2016     Hyperlipidemia 2016     Post-menopausal osteoporosis 2012       LOS (days): 3  Geometric Mean LOS (GMLOS) (days): 3.9  Days to GMLOS:1.3     OBJECTIVE:  Risk of Unplanned Readmission Score: 11.56         Current admission status: Inpatient   Preferred Pharmacy:   [id number]  [address] [id number]  Phone: [phone] Fax: [phone]    Primary Care Provider: Beckie García MD    Primary Insurance: MEDICARE  Secondary Insurance: [facility]    DISCHARGE DETAILS:  Additional Comments: CM contacted pt's family via phone call to review pt choice list. Pt and pt's family have selected [facility] at this time. CM contacted [facility] to inquire if they have a bed available. SAMUEL spoke with Shayla who reports she is the manager on duty. Shayla reports she would have to touch base with someone in admission to see if pt could be accepted over the weekend. Shayla reports she will attempt to contact someone, but if she cannot get  anyone, admission would have to be for Monday.     ADDENDUM 1:27pm: SAMUEL received return pc from Shayla of [facility] who informed they need to review the clinicals and requested CM fax them to [phone] as she does not have access to [employer]. CM faxed requested documents at this time. Per Shayla, if they can accept pt it would be tomorrow vs Monday. They are unable to accommodate her for today. CM left VM for pt's step-son, Tiburcio, informing of above.

## 2024-07-28 VITALS
WEIGHT: 107 LBS | BODY MASS INDEX: 19.69 KG/M2 | TEMPERATURE: 97.7 F | HEART RATE: 74 BPM | OXYGEN SATURATION: 92 % | RESPIRATION RATE: 18 BRPM | DIASTOLIC BLOOD PRESSURE: 57 MMHG | HEIGHT: 62 IN | SYSTOLIC BLOOD PRESSURE: 104 MMHG

## 2024-07-28 LAB
ANION GAP SERPL CALCULATED.3IONS-SCNC: 2 MMOL/L (ref 4–13)
BACTERIA SPEC BFLD CULT: NO GROWTH
BUN SERPL-MCNC: 18 MG/DL (ref 5–25)
CALCIUM SERPL-MCNC: 8.3 MG/DL (ref 8.4–10.2)
CHLORIDE SERPL-SCNC: 95 MMOL/L (ref 96–108)
CO2 SERPL-SCNC: 38 MMOL/L (ref 21–32)
CREAT SERPL-MCNC: 0.42 MG/DL (ref 0.6–1.3)
GFR SERPL CREATININE-BSD FRML MDRD: 89 ML/MIN/1.73SQ M
GLUCOSE SERPL-MCNC: 95 MG/DL (ref 65–140)
GRAM STN SPEC: NORMAL
GRAM STN SPEC: NORMAL
POTASSIUM SERPL-SCNC: 3.9 MMOL/L (ref 3.5–5.3)
SODIUM SERPL-SCNC: 135 MMOL/L (ref 135–147)

## 2024-07-28 PROCEDURE — 99239 HOSP IP/OBS DSCHRG MGMT >30: CPT | Performed by: FAMILY MEDICINE

## 2024-07-28 PROCEDURE — 80048 BASIC METABOLIC PNL TOTAL CA: CPT | Performed by: FAMILY MEDICINE

## 2024-07-28 RX ORDER — FUROSEMIDE 20 MG/1
20 TABLET ORAL DAILY
Start: 2024-07-29 | End: 2024-07-30 | Stop reason: SDUPTHER

## 2024-07-28 RX ADMIN — APIXABAN 2.5 MG: 2.5 TABLET, FILM COATED ORAL at 08:10

## 2024-07-28 NOTE — ASSESSMENT & PLAN NOTE
Patient was treated with pneumonia in end of April and beginning of May with doxycycline for 7 days and Levaquin for 7 days respectively.  Chest x-ray in end of April showed lateral segment right middle lobe atelectasis and or infiltrate,small right effusion.   X-ray showed venous congestion with moderate right and small left pleural effusion  Status post right-sided thoracentesis with removal of 700 mL of marija pleural fluid by IR on 7/25   Fluid culture with no growth. serum LDH -normal at 215.

## 2024-07-28 NOTE — DISCHARGE SUMMARY
[facility]  Discharge- Monse Murray 2/16/1933, 89 y.o. female MRN: [id number]  Unit/Bed#: 2 [place] [address] Encounter: [id number]  Primary Care Provider: Beckie García MD   Date and time admitted to hospital: 7/24/2024  3:43 PM    * Acute on chronic diastolic congestive heart failure (HCC)  Assessment & Plan  Patient presented with dyspnea on exertion with associated mild chest pain during SOB episodes.    Was on HCTZ 25 mg p.o. daily in the past.  Not currently on home med list.  2D echo in 2019 showed normal EF around 60%, elevated mean left atrial filling pressure, no significant valvular disease.    Given Lasix 40 mg IV in ED with good response.  Was on Lasix 40 mg IV daily, transitioned to 20 mg of Lasix daily  2D echo -EF normal and abnormal diastolic function.  Left atrium is moderately dilated.  Right ventricular systolic pressure is mild to moderately elevated at 50 mmHg  Cardiac diet with fluid restriction 1800 cc/day  Daily weight, intake output  Continue Lopressor.  Follow-up with cardiology after discharge          Bilateral pleural effusion  Assessment & Plan  Patient was treated with pneumonia in end of April and beginning of May with doxycycline for 7 days and Levaquin for 7 days respectively.  Chest x-ray in end of April showed lateral segment right middle lobe atelectasis and or infiltrate,small right effusion.   X-ray showed venous congestion with moderate right and small left pleural effusion  Status post right-sided thoracentesis with removal of 700 mL of marija pleural fluid by IR on 7/25   Fluid culture with no growth. serum LDH -normal at 215.    Hypertension  Assessment & Plan  Continue metoprolol.  BPs stable     History of colon cancer  Assessment & Plan  Status post treatment    Paroxysmal atrial fibrillation (HCC)  Assessment & Plan  On Eliquis, metoprolol which is being continued  EKG controlled A-fib.    Hyperlipidemia  Assessment & Plan  Continue Zetia,  statin.    Hyponatremia-resolved as of 7/25/2024  Assessment & Plan  Sodium 132, initially now resolved   Suspect due to volume overload  IV Lasix as above      Medical Problems       Resolved Problems  Date Reviewed: 7/28/2024            Resolved    Hyponatremia 7/25/2024     Resolved by  Patricia Rosario DO        Discharging Physician / Practitioner: Patricia Rosario DO  PCP: Beckie García MD  Admission Date:   Admission Orders (From admission, onward)       Ordered        07/24/24 2307  INPATIENT ADMISSION  Once            07/24/24 1933  Place in Observation  Once                          Discharge Date: 07/28/24    Consultations During Hospital Stay:  Cardiology  IR    Procedures Performed:   Thoracentesis  TTE    Significant Findings / Test Results:   See above    Incidental Findings:   None    Test Results Pending at Discharge (will require follow up):   None     Outpatient Tests Requested:  BMP    Complications: None    Reason for Admission: CHF exacerbation    Hospital Course:   Monse Murray is a 89 y.o. female patient who originally presented to the hospital on 7/24/2024 due to Progressively worsening shortness of breath with exertion associated with some mild chest pain.  Patient does report chronic bilateral lower extremity edema over the last few years which also has worsened.  Patient was admitted and diuresed with IV Lasix for CHF exacerbation and seen by cardiology.  She was also noted to have bilateral pleural effusions and underwent thoracentesis by IR while here.  Volume status has improved and patient cleared by all consultants involved in a care prior to discharge.  Patient states that she is no longer taking Fosamax.  Recommended follow-up with PCP. discharge plan discussed in details with patient    Please see above list of diagnoses and related plan for additional information.     Condition at Discharge: Stable    Discharge Day Visit / Exam:   Subjective: Patient states breathing is  "better.  Denies any shortness of breath at rest.  No significant shortness of breath with exertion either    Vitals: Blood Pressure: 104/57 (07/28/24 0809)  Pulse: 74 (07/28/24 0809)  Temperature: 97.7 °F (36.5 °C) (07/28/24 0809)  Temp Source: Temporal (07/27/24 1526)  Respirations: 18 (07/28/24 0900)  Height: 5' 2\" (157.5 cm) (07/25/24 0740)  Weight - Scale: 50.3 kg (111 lb) (07/27/24 0600)  SpO2: 92 % (07/28/24 0809)  Exam:   Physical Exam  Constitutional:       General: She is not in acute distress.     Appearance: She is not toxic-appearing.   HENT:      Head: Normocephalic and atraumatic.   Eyes:      General: No scleral icterus.  Cardiovascular:      Rate and Rhythm: Normal rate. Rhythm irregular.   Pulmonary:      Effort: Pulmonary effort is normal. No respiratory distress.      Breath sounds: Normal breath sounds. No wheezing or rales.   Abdominal:      General: Bowel sounds are normal. There is no distension.      Palpations: Abdomen is soft.      Tenderness: There is no abdominal tenderness.   Musculoskeletal:      Comments: Improved bilateral lower extremity edema compared to before   Neurological:      Mental Status: She is alert.          Discussion with Family: Attempted to update  (daughter in law) via phone. Left voicemail.     Discharge instructions/Information to patient and family:   See after visit summary for information provided to patient and family.      Provisions for Follow-Up Care:  See after visit summary for information related to follow-up care and any pertinent home health orders.      Mobility at time of Discharge:   Basic Mobility Inpatient Raw Score: 18  JH-HLM Goal: 6: Walk 10 steps or more  JH-HLM Achieved: 6: Walk 10 steps or more  HLM Goal achieved. Continue to encourage appropriate mobility.     Disposition:   Other Skilled Nursing Facility at [facility]    Planned Readmission: no     Discharge Statement:  I spent > 30 minutes discharging the patient. " This time was spent on the day of discharge. I had direct contact with the patient on the day of discharge. Greater than 50% of the total time was spent examining patient, answering all patient questions, arranging and discussing plan of care with patient as well as directly providing post-discharge instructions.  Additional time then spent on discharge activities.    Discharge Medications:  See after visit summary for reconciled discharge medications provided to patient and/or family.      **Please Note: This note may have been constructed using a voice recognition system**

## 2024-07-28 NOTE — NURSING NOTE
Report called to receiving facility. IV and Masimo discontinue. Pt discharge with personal belonging glasses's, cell phone with .

## 2024-07-28 NOTE — PLAN OF CARE
Problem: Potential for Falls  Goal: Patient will remain free of falls  Description: INTERVENTIONS:  - Educate patient/family on patient safety including physical limitations  - Instruct patient to call for assistance with activity   - Consult OT/PT to assist with strengthening/mobility   - Keep Call bell within reach  - Keep bed low and locked with side rails adjusted as appropriate  - Keep care items and personal belongings within reach  - Initiate and maintain comfort rounds  - Make Fall Risk Sign visible to staff  - Offer Toileting every 2 Hours, in advance of need  - Initiate/Maintain bed/chair alarm    - Apply yellow socks and bracelet for high fall risk patients  - Consider moving patient to room near nurses station  Outcome: Progressing     Problem: PAIN - ADULT  Goal: Verbalizes/displays adequate comfort level or baseline comfort level  Description: Interventions:  - Encourage patient to monitor pain and request assistance  - Assess pain using appropriate pain scale  - Administer analgesics based on type and severity of pain and evaluate response  - Implement non-pharmacological measures as appropriate and evaluate response  - Consider cultural and social influences on pain and pain management  - Notify physician/advanced practitioner if interventions unsuccessful or patient reports new pain  Outcome: Progressing     Problem: SAFETY ADULT  Goal: Patient will remain free of falls  Description: INTERVENTIONS:  - Educate patient/family on patient safety including physical limitations  - Instruct patient to call for assistance with activity   - Consult OT/PT to assist with strengthening/mobility   - Keep Call bell within reach  - Keep bed low and locked with side rails adjusted as appropriate  - Keep care items and personal belongings within reach  - Initiate and maintain comfort rounds  - Make Fall Risk Sign visible to staff  - Offer Toileting every 2 Hours, in advance of need  - Initiate/Maintain bed/chair  alarm    - Apply yellow socks and bracelet for high fall risk patients  - Consider moving patient to room near nurses station  Outcome: Progressing     Problem: Knowledge Deficit  Goal: Patient/family/caregiver demonstrates understanding of disease process, treatment plan, medications, and discharge instructions  Description: Complete learning assessment and assess knowledge base.  Interventions:  - Provide teaching at level of understanding  - Provide teaching via preferred learning methods  Outcome: Progressing     Problem: CARDIOVASCULAR - ADULT  Goal: Maintains optimal cardiac output and hemodynamic stability  Description: INTERVENTIONS:  - Monitor I/O, vital signs and rhythm  - Monitor for S/S and trends of decreased cardiac output  - Administer and titrate ordered vasoactive medications to optimize hemodynamic stability  - Assess quality of pulses, skin color and temperature  - Assess for signs of decreased coronary artery perfusion  - Instruct patient to report change in severity of symptoms  Outcome: Progressing     Problem: METABOLIC, FLUID AND ELECTROLYTES - ADULT  Goal: Electrolytes maintained within normal limits  Description: INTERVENTIONS:  - Monitor labs and assess patient for signs and symptoms of electrolyte imbalances  - Administer electrolyte replacement as ordered  - Monitor response to electrolyte replacements, including repeat lab results as appropriate  - Instruct patient on fluid and nutrition as appropriate  Outcome: Progressing  Goal: Fluid balance maintained  Description: INTERVENTIONS:  - Monitor labs   - Monitor I/O and WT  - Instruct patient on fluid and nutrition as appropriate  - Assess for signs & symptoms of volume excess or deficit  Outcome: Progressing     Problem: Prexisting or High Potential for Compromised Skin Integrity  Goal: Skin integrity is maintained or improved  Description: INTERVENTIONS:  - Identify patients at risk for skin breakdown  - Assess and monitor skin  integrity  - Assess and monitor nutrition and hydration status  - Monitor labs   - Assess for incontinence   - Turn and reposition patient  - Assist with mobility/ambulation  - Relieve pressure over bony prominences  - Avoid friction and shearing  - Provide appropriate hygiene as needed including keeping skin clean and dry  - Evaluate need for skin moisturizer/barrier cream  - Collaborate with interdisciplinary team   - Patient/family teaching  - Consider wound care consult   Outcome: Progressing

## 2024-07-28 NOTE — [place] UNIVERSAL TRANSFER FORM
"[place] UNIVERSAL TRANSFER FORM  (ALL ITEMS MUST BE COMPLETED)    1. TRANSFER FROM: [facility]      TRANSFER TO: [facility]     2. DATE OF TRANSFER: 7/28/2024                        TIME OF TRANSFER: [place]    3. PATIENT NAME: Monse Murray R      YOB: 1933                             GENDER: female    4. LANGUAGE:   English    5. PHYSICIAN NAME:  Patricia Rosario DO                   PHONE: [phone]    6. CODE STATUS: Level 3 - DNAR and DNI        Out of Hospital DNR Attached: Yes    7. :                                      :  Extended Emergency Contact Information  Primary Emergency Contact: Sirena Murray  Mobile Phone: [phone]  Relation: Daughter In-Law  Secondary Emergency Contact: МАРИЯ MURRAY  Mobile Phone: [phone]  Relation: Son           Health Care Representative/Proxy:  Yes           Legal Guardian:  No             NAME OF:           HEALTH CARE REPRESENTATIVE/PROXY:                                         OR           LEGAL GUARDIAN, IF NOT :                                               PHONE:  (Day)           (Night)                        (Cell)    8. REASON FOR TRANSFER: (Must include brief medical history and recent changes in physical function or cognition.) Rehab            V/S: /57   Pulse 74   Temp 97.7 °F (36.5 °C)   Resp 18   Ht 5' 2\" (1.575 m)   Wt 48.5 kg (107 lb)   SpO2 92%   BMI 19.57 kg/m²           PAIN: None    9. PRIMARY DIAGNOSIS: Acute on chronic diastolic congestive heart failure (HCC)      Secondary Diagnosis:         Pacemaker: No      Internal Defib: No          Mental Health Diagnosis (if Applicable):    10. RESTRAINTS: No     11. RESPIRATORY NEEDS: None    12. ISOLATION/PRECAUTION: None    13. ALLERGY: Azithromycin, Niacin and related, and Keflex [cephalexin]    14. SENSORY:       Vision Glasses    15. SKIN CONDITION: Yes:  Pressure    16. DIET: " Cardia    17. IV ACCESS: None    18. PERSONAL ITEMS SENT WITH PATIENT: Glasses    19. ATTACHED DOCUMENTS: MUST ATTACH CURRENT MEDICATION INFORMATION MAR    20. AT RISK ALERTS:Falls        HARM TO: N/A    21. WEIGHT BEARING STATUS:         Left Leg: Limited        Right Leg: Limited    22. MENTAL STATUS:Alert and Oriented    23. FUNCTION:        Walk: With Help        Transfer: With Help        Toilet: With Help        Feed: Self    24. IMMUNIZATIONS/SCREENING:     Immunization History   Administered Date(s) Administered    COVID-19 MODERNA VACC 0.25 ML IM BOOSTER 11/12/2021    COVID-19 MODERNA VACC 0.5 ML IM 02/12/2021, 03/12/2021    Influenza Split High Dose Preservative Free IM 10/09/2012, 11/03/2014, 10/11/2016, 10/13/2017    Influenza, high dose seasonal 0.7 mL 10/15/2018, 09/16/2019, 12/03/2020, 09/27/2022    Influenza, seasonal, injectable 10/18/2006, 10/10/2007, 10/31/2008, 10/12/2010, 10/10/2011, 12/27/2013    Pneumococcal Conjugate 13-Valent 06/29/2015    Pneumococcal Polysaccharide PPV23 06/24/2013    Tdap 04/05/2007       25. BOWEL: Continent    26. BLADDER: Incontinent    27. SENDING FACILITY CONTACT: [facility]                  Title: RN        Unit: 2s        Phone: [phone]          REC'G FACILITY CONTACT (if known):        Title:        Unit:         Phone:         FORM PREFILLED BY (if applicable)       Title:       Unit:        Phone:         FORM COMPLETED BY Areli Conn RN      Title: Rn      Phone: [phone]

## 2024-07-28 NOTE — CASE MANAGEMENT
Case Management Discharge Planning Note    Patient name Monse Murray  Location [address] MRN [id number]  : 1933 Date 2024       Current Admission Date: 2024  Current Admission Diagnosis:Acute on chronic diastolic congestive heart failure (HCC)   Patient Active Problem List    Diagnosis Date Noted Date Diagnosed    Hypertension      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HCC)      Osteoporosis 2022     History of colon cancer 2021     On continuous oral anticoagulation 2021     Acute on chronic diastolic congestive heart failure (HCC) 2020     Lung nodule 2019     Paroxysmal atrial fibrillation (HCC) 2019     Bilateral pleural effusion 2019     Mediastinal adenopathy 2019     Renal cyst 2019     Thrombocytosis 2019     Macular degeneration 10/13/2017     Benign essential hypertension 2016     Hyperlipidemia 2016     Post-menopausal osteoporosis 2012       LOS (days): 4  Geometric Mean LOS (GMLOS) (days): 3.9  Days to GMLOS:0.5     OBJECTIVE:  Risk of Unplanned Readmission Score: 11.71         Current admission status: Inpatient   [id number] [address] [facility]  [address] [place] [address]  Phone: [phone] Fax: [phone]    Primary Care Provider: Beckie García MD    Primary Insurance: MEDICARE  Secondary Insurance: [facility]    DISCHARGE DETAILS:    Other Referral/Resources/Interventions Provided:  Referral Comments: [facility] able to accept pt today.  Programs:: CHF         Treatment Team Recommendation: Short Term Rehab  Discharge Destination Plan:: Short Term Rehab     Dispatcher Contacted: Yes  Number/Name of Dispatcher: Roundtrip     ETA of Transport (Date): 24  ETA of Transport (Time): 1130     Family notified:: CM contacted family and informed them of transport to [facility] today. Family agreeable.        Accepting Facility Name, City & State : [facility]  Receiving Facility/Agency Phone Number: Phone: [phone]  Facility/Agency Fax Number: Fax: [phone]     CM left VM for Shayla of [facility] with transport time.

## 2024-07-28 NOTE — DISCHARGE INSTR - AVS FIRST PAGE
The patient is being discharged on lasix oral diuretic. If the patient- prior to outpatient follow-up- complains of worsening symptoms or significant weight gain (3 pounds in a day or 5 pounds or more in week), please notify cardiology/medical provider at nursing facility for further suggestions regarding diuretic dose adjustment/earlier follow-up.

## 2024-07-28 NOTE — ASSESSMENT & PLAN NOTE
Patient presented with dyspnea on exertion with associated mild chest pain during SOB episodes.    Was on HCTZ 25 mg p.o. daily in the past.  Not currently on home med list.  2D echo in 2019 showed normal EF around 60%, elevated mean left atrial filling pressure, no significant valvular disease.    Given Lasix 40 mg IV in ED with good response.  Was on Lasix 40 mg IV daily, transitioned to 20 mg of Lasix daily  2D echo -EF normal and abnormal diastolic function.  Left atrium is moderately dilated.  Right ventricular systolic pressure is mild to moderately elevated at 50 mmHg  Cardiac diet with fluid restriction 1800 cc/day  Daily weight, intake output  Continue Lopressor.  Follow-up with cardiology after discharge

## 2024-07-28 NOTE — PLAN OF CARE
Problem: CARDIOVASCULAR - ADULT  Goal: Maintains optimal cardiac output and hemodynamic stability  Description: INTERVENTIONS:  - Monitor I/O, vital signs and rhythm  - Monitor for S/S and trends of decreased cardiac output  - Administer and titrate ordered vasoactive medications to optimize hemodynamic stability  - Assess quality of pulses, skin color and temperature  - Assess for signs of decreased coronary artery perfusion  - Instruct patient to report change in severity of symptoms  7/28/2024 1116 by Areli Conn RN  Outcome: Adequate for Discharge  7/28/2024 1050 by Areli Conn RN  Outcome: Progressing     Problem: METABOLIC, FLUID AND ELECTROLYTES - ADULT  Goal: Electrolytes maintained within normal limits  Description: INTERVENTIONS:  - Monitor labs and assess patient for signs and symptoms of electrolyte imbalances  - Administer electrolyte replacement as ordered  - Monitor response to electrolyte replacements, including repeat lab results as appropriate  - Instruct patient on fluid and nutrition as appropriate  7/28/2024 1116 by Areli Conn RN  Outcome: Adequate for Discharge  7/28/2024 1050 by Areli Conn RN  Outcome: Progressing     Problem: METABOLIC, FLUID AND ELECTROLYTES - ADULT  Goal: Fluid balance maintained  Description: INTERVENTIONS:  - Monitor labs   - Monitor I/O and WT  - Instruct patient on fluid and nutrition as appropriate  - Assess for signs & symptoms of volume excess or deficit  7/28/2024 1116 by Areli Conn RN  Outcome: Adequate for Discharge  7/28/2024 1050 by Areli Conn RN  Outcome: Progressing

## 2024-07-29 ENCOUNTER — TELEPHONE (OUTPATIENT)
Dept: CARDIOLOGY CLINIC | Facility: CLINIC | Age: 89
End: 2024-07-29

## 2024-07-29 ENCOUNTER — TRANSITIONAL CARE MANAGEMENT (OUTPATIENT)
Dept: FAMILY MEDICINE CLINIC | Facility: CLINIC | Age: 89
End: 2024-07-29

## 2024-07-29 NOTE — TELEPHONE ENCOUNTER
I held an appt for the patient for 7/30 but [facility] needed more time to set up transportation.   She has been scheduled for 8/1

## 2024-07-30 ENCOUNTER — OFFICE VISIT (OUTPATIENT)
Dept: FAMILY MEDICINE CLINIC | Facility: CLINIC | Age: 89
End: 2024-07-30
Payer: MEDICARE

## 2024-07-30 ENCOUNTER — TELEPHONE (OUTPATIENT)
Age: 89
End: 2024-07-30

## 2024-07-30 ENCOUNTER — PATIENT OUTREACH (OUTPATIENT)
Dept: CASE MANAGEMENT | Facility: OTHER | Age: 89
End: 2024-07-30

## 2024-07-30 VITALS
WEIGHT: 108 LBS | BODY MASS INDEX: 19.88 KG/M2 | OXYGEN SATURATION: 91 % | HEART RATE: 65 BPM | HEIGHT: 62 IN | SYSTOLIC BLOOD PRESSURE: 92 MMHG | RESPIRATION RATE: 18 BRPM | TEMPERATURE: 97.2 F | DIASTOLIC BLOOD PRESSURE: 62 MMHG

## 2024-07-30 DIAGNOSIS — I50.33 ACUTE ON CHRONIC DIASTOLIC CONGESTIVE HEART FAILURE (HCC): Primary | ICD-10-CM

## 2024-07-30 DIAGNOSIS — E78.5 HYPERLIPIDEMIA, UNSPECIFIED HYPERLIPIDEMIA TYPE: ICD-10-CM

## 2024-07-30 DIAGNOSIS — I10 BENIGN ESSENTIAL HYPERTENSION: ICD-10-CM

## 2024-07-30 DIAGNOSIS — J90 BILATERAL PLEURAL EFFUSION: ICD-10-CM

## 2024-07-30 DIAGNOSIS — R26.2 AMBULATORY DYSFUNCTION: ICD-10-CM

## 2024-07-30 DIAGNOSIS — I48.20 CHRONIC ATRIAL FIBRILLATION (HCC): ICD-10-CM

## 2024-07-30 PROCEDURE — 88305 TISSUE EXAM BY PATHOLOGIST: CPT | Performed by: STUDENT IN AN ORGANIZED HEALTH CARE EDUCATION/TRAINING PROGRAM

## 2024-07-30 PROCEDURE — 88112 CYTOPATH CELL ENHANCE TECH: CPT | Performed by: STUDENT IN AN ORGANIZED HEALTH CARE EDUCATION/TRAINING PROGRAM

## 2024-07-30 PROCEDURE — 99495 TRANSJ CARE MGMT MOD F2F 14D: CPT | Performed by: FAMILY MEDICINE

## 2024-07-30 RX ORDER — FUROSEMIDE 20 MG/1
20 TABLET ORAL DAILY
Qty: 90 TABLET | Refills: 0 | Status: SHIPPED | OUTPATIENT
Start: 2024-07-30

## 2024-07-30 NOTE — TELEPHONE ENCOUNTER
Caller: Tiburcio Murray    Doctor: Dr. Chaudhry    Call back #: [phone]    Reason for call: Patient's son called to reschedule appointment for August 2nd as patient will be moving to [place] August 3rd. Patient would like a follow up appointment scheduled for this day before moving. As of right now, there are no available appointments with Emmie Franklin or Aysha Rubio. Please advise patient if able to schedule an appointment for August 2nd. Thank you!

## 2024-07-30 NOTE — PROGRESS NOTES
Outpatient Care management referral via HF  Discharge report 7/29/24. Discharged from [facility] on 7/28/24 to [facility].  Email sent to facility to inform them I will be following the patient during their skilled stay.  This Admin Coordinator will continue to monitor via chart review.

## 2024-07-30 NOTE — PROGRESS NOTES
Chief Complaint   Patient presents with   • Transition of Care Management     [facility] for chf and hypoxia patient is feeling better . She also need paperwork for a assisted living home      TCM Call     Date and time call was made  1/22/2019 10:16 AM    Patient was hospitialized at  [facility]    Date of Admission  01/18/19    Date of discharge  01/21/19    Diagnosis  Pneumonia    Disposition  Home    Were the patients medications reviewed and updated  Yes    Current Symptoms  Fatigue    Fatigue severity  Mild      TCM Call     Post hospital issues  None    Should patient be enrolled in anticoag monitoring?  No    Scheduled for follow up?  Yes    Did you obtain your prescribed medications  Yes    Do you need help managing your prescriptions or medications  No    Is transportation to your appointment needed  No    I have advised the patient to call PCP with any new or worsening symptoms  [facility] LPN 01/22/2019    Living Arrangements  Alone    Are you recieving any outpatient services  No    Are you recieving home care services  Yes    Types of home care services  Home health aid    Are you using any community resources  No    Current waiver services  No    Have you fallen in the last 12 months  No    Interperter language line needed  No    Counseling  Patient          Patient ID: Monse Murray is a 89 y.o. female.    HPI  Pt is seeing for f/u recent hospital stay for Acute on chronic CHF -  pleural effusion, legs swelling -  was diuresed with IV Lasix for CHF exacerbation and seen by cardiology. She was also noted to have bilateral pleural effusions and underwent R sided thoracentesis by IR with removal of 700 ml  -  feeling better -  increased confusion noticed prior to admission -  seen with her family -  will be moved to assisted living in [place] in 4 days to be closer to family     The following portions of the patient's history were reviewed and updated as appropriate: allergies, current  "medications, past family history, past medical history, past social history, past surgical history and problem list.    Review of Systems   Constitutional:  Positive for activity change. Negative for appetite change, fever and unexpected weight change.   HENT: Negative.     Respiratory: Negative.     Cardiovascular:  Positive for palpitations (minimal) and leg swelling (improved). Negative for chest pain.   Gastrointestinal: Negative.    Genitourinary: Negative.    Musculoskeletal:  Positive for gait problem (using a walker).   Psychiatric/Behavioral:  Positive for confusion and decreased concentration. Negative for dysphoric mood. The patient is not nervous/anxious.        Current Outpatient Medications   Medication Sig Dispense Refill   • apixaban (Eliquis) 2.5 mg Take 1 tablet (2.5 mg total) by mouth 2 (two) times a day 60 tablet 5   • ezetimibe-simvastatin (VYTORIN) 10-20 mg per tablet TAKE ONE TABLET BY MOUTH AT BEDTIME 90 tablet 1   • furosemide (LASIX) 20 mg tablet Take 1 tablet (20 mg total) by mouth daily 90 tablet 0   • metoprolol tartrate (LOPRESSOR) 25 mg tablet Take 1 tablet (25 mg total) by mouth every 12 (twelve) hours 200 tablet 1     No current facility-administered medications for this visit.       Objective:    BP 92/62 (BP Location: Left arm, Patient Position: Sitting, Cuff Size: Standard)   Pulse 65   Temp (!) 97.2 °F (36.2 °C)   Resp 18   Ht 5' 2\" (1.575 m)   Wt 49 kg (108 lb)   SpO2 91%   BMI 19.75 kg/m²        Physical Exam  Constitutional:       General: She is not in acute distress.     Appearance: She is underweight.   Cardiovascular:      Rate and Rhythm: Normal rate. Rhythm irregularly irregular.      Heart sounds: Murmur heard.      No gallop.   Pulmonary:      Effort: No respiratory distress.      Breath sounds: No wheezing, rhonchi or rales.   Abdominal:      Palpations: Abdomen is soft.      Tenderness: There is no abdominal tenderness.   Musculoskeletal:      Right lower leg: " Edema (trace) present.      Left lower leg: Edema (trace) present.   Neurological:      General: No focal deficit present.      Mental Status: She is alert.      Comments: Some confusion noticed    Psychiatric:         Mood and Affect: Mood normal.         Behavior: Behavior is cooperative.           Labs in chart were reviewed.      Assessment/Plan:         Diagnoses and all orders for this visit:    Acute on chronic diastolic congestive heart failure (HCC)  -     furosemide (LASIX) 20 mg tablet; Take 1 tablet (20 mg total) by mouth daily    Bilateral pleural effusion    Chronic atrial fibrillation (HCC)    Benign essential hypertension    Hyperlipidemia, unspecified hyperlipidemia type        Cont meds  F/u with cardiologist     Rto chuy García MD

## 2024-07-31 NOTE — TELEPHONE ENCOUNTER
Update: 7/31/2024:  Stephanie García     Telephone Encounter      Signed     Encounter Date: 7/30/2024     Signed         Caller: Tiburcio Murray     Doctor: Dr. Chaudhry     Call back #: [phone]     Reason for call: Patient's son called to reschedule appointment for August 2nd as patient will be moving to [place] August 3rd. Patient would like a follow up appointment scheduled for this day before moving. As of right now, there are no available appointments with Emmie Franklin or Aysha Rubio. Please advise patient if able to schedule an appointment for August 2nd. Thank you!            Electronically signed by Stephanie Matute at 7/30/2024  9:15 AM      I do not understand why the appointment was canceled for 8/1. I will try to reach the son, but now we will not have anything until 2nd week of aug

## 2024-08-06 ENCOUNTER — TELEPHONE (OUTPATIENT)
Dept: INPATIENT UNIT | Facility: HOSPITAL | Age: 89
End: 2024-08-06

## 2024-08-06 ENCOUNTER — PATIENT OUTREACH (OUTPATIENT)
Dept: CASE MANAGEMENT | Facility: OTHER | Age: 89
End: 2024-08-06

## 2024-08-14 ENCOUNTER — PATIENT OUTREACH (OUTPATIENT)
Dept: CASE MANAGEMENT | Facility: OTHER | Age: 89
End: 2024-08-14

## 2024-08-14 NOTE — PROGRESS NOTES
Update obtained from PCC the patient discharged 8/11/24 to [facility] in [place]. I have removed myself from the care team, updated the Care Coordination note, and closed the episode.

## 2024-08-27 ENCOUNTER — TELEPHONE (OUTPATIENT)
Age: 89
End: 2024-08-27

## 2024-08-27 NOTE — TELEPHONE ENCOUNTER
Pt's daughter in law Sirena Murray calling stating pt moved to a live in facility in [place] and won't be coming to any appts w/ us anymore. I confirmed that pt does not have any appts w/ us. Per Sirena thank you.

## 2024-09-13 ENCOUNTER — TELEPHONE (OUTPATIENT)
Age: 89
End: 2024-09-13

## 2024-09-13 NOTE — TELEPHONE ENCOUNTER
PT son called and stated he spoke to someone to obtain her medical records and they said he needed a fax number so he called back and I updated her new phone number and address for [place] and her new Dr will be Dr Molly Marshall who works for a clinic but also work in her new [facility] in facility and their phone number is  and fax . Please send records. Thank you

## 2024-09-16 NOTE — TELEPHONE ENCOUNTER
Faxed last office note and labs to Dr Marshall.    LEFT MESSAGE ON MACHINE for patient son advising patient needs to sign release at Dr Marshall's office for them to fax to us for complete medical records which will be sent by copy service [employer]. Left our fax# in message.

## 2025-05-30 NOTE — RESULT NOTES
Informed patient she is ok with us sending med to [facility].   Verified Results  * XR CHEST PA & LATERAL [id number] 02:36PM [address] Order Number: [id number]     Test Name Result Flag Reference   XR CHEST PA & LATERAL (Report)     CHEST - DUAL ENERGY     INDICATION: Follow-up pneumonia  COMPARISON: 8/29/2016  VIEWS: PA (including soft tissue/bone algorithms) and lateral projections; 4 images     FINDINGS:        Cardiomediastinal silhouette appears unremarkable  Calcified right paramediastinal and hilar lymph nodes stable  The lungs are mildly hyperinflated with resolution of the right basal infiltrate  No lobar consolidation or interstitial edema  Scattered right upper lobe granulomas again noted  No pleural effusions or pneumothorax  Visualized osseous structures appear within normal limits for the patient's age  IMPRESSION:     No active pulmonary disease         Workstation performed: [id number]     Signed by:   Eliecer Arteaga MD   10/12/16